# Patient Record
Sex: FEMALE | Race: ASIAN | NOT HISPANIC OR LATINO | Employment: OTHER | URBAN - METROPOLITAN AREA
[De-identification: names, ages, dates, MRNs, and addresses within clinical notes are randomized per-mention and may not be internally consistent; named-entity substitution may affect disease eponyms.]

---

## 2017-01-11 ENCOUNTER — OFFICE VISIT (OUTPATIENT)
Dept: MEDICAL GROUP | Age: 72
End: 2017-01-11
Payer: MEDICARE

## 2017-01-11 VITALS
WEIGHT: 117.6 LBS | HEART RATE: 72 BPM | OXYGEN SATURATION: 96 % | BODY MASS INDEX: 23.09 KG/M2 | TEMPERATURE: 97.9 F | SYSTOLIC BLOOD PRESSURE: 126 MMHG | DIASTOLIC BLOOD PRESSURE: 68 MMHG | HEIGHT: 60 IN

## 2017-01-11 DIAGNOSIS — I10 ESSENTIAL HYPERTENSION: ICD-10-CM

## 2017-01-11 DIAGNOSIS — K21.9 GASTROESOPHAGEAL REFLUX DISEASE WITHOUT ESOPHAGITIS: ICD-10-CM

## 2017-01-11 DIAGNOSIS — E78.5 DYSLIPIDEMIA: ICD-10-CM

## 2017-01-11 DIAGNOSIS — J45.30 MILD PERSISTENT ASTHMA WITHOUT COMPLICATION: ICD-10-CM

## 2017-01-11 DIAGNOSIS — R73.01 IFG (IMPAIRED FASTING GLUCOSE): ICD-10-CM

## 2017-01-11 DIAGNOSIS — R10.10 PAIN OF UPPER ABDOMEN: ICD-10-CM

## 2017-01-11 PROCEDURE — 99215 OFFICE O/P EST HI 40 MIN: CPT | Performed by: INTERNAL MEDICINE

## 2017-01-11 NOTE — Clinical Note
[facility]  Wendy Galan M.D.  [address]  [place] [address]  Fax: [phone] Authorization for Release/Disclosure of Protected Health Information   Name: TERRELL MULLER : 1945 SSN: [id number]   Address: [facility] [place] [address] Phone:    [phone] (home)    I authorize the entity listed below to release/disclose the PHI below to [facility]/Wendy Galan M.D.   Provider or Entity Name:  Dr. Migue Garcia     Address   City, [facility], [facility] Phone:      Fax:     Reason for request: continuity of care   Information to be released:    [  ] LAST COLONOSCOPY, including any PATH REPORT [  ] LAST DEXA  [  ] LAST MAMMOGRAM  [  ] LAST PAP [  ] RETINA EXAM REPORT  [  ] IMMUNIZATION RECORDS  [ x] Release all info      [  ] Check here and initial the line next to each item to release ALL health information INCLUDING  _____ Care and treatment for drug and / or alcohol abuse  _____ HIV testing, infection status, or AIDS  _____ Genetic Testing    DATES OF SERVICE OR TIME PERIOD TO BE DISCLOSED: _____________  I understand and acknowledge that:  * This Authorization may be revoked at any time by you in writing, except if your health information has already been used or disclosed.  * Your health information that will be used or disclosed as a result of you signing this authorization could be re-disclosed by the recipient. If this occurs, your re-disclosed health information may no longer be protected by State or Federal laws.  * You may refuse to sign this Authorization. Your refusal will not affect your ability to obtain treatment.  * This Authorization becomes effective upon signing and will  on (date) __________. If no date is indicated, this Authorization will  one (1) year from the signature date.    Name: Terrell Muller    Signature:     Date: 2017

## 2017-01-12 NOTE — ASSESSMENT & PLAN NOTE
Patient reported intermittent pain on right upper abdomen for a few months. She reported that pain is stabbing and cramping in nature and occur sporadically. She likes to eat beans and recently changed to High fiber diet. She reported has regular bowel movement without blood in stool. She has EGD and colonoscopy in [place] recently. She intentionally tried to lose weight with diet and exercise. Her abdominal pain is not related to meal.

## 2017-01-12 NOTE — ASSESSMENT & PLAN NOTE
Her blood pressure is well controlled with carvedilol 6.25 mg twice a day. She reported taking medication as instructed and denies side effects from taking it.

## 2017-01-12 NOTE — ASSESSMENT & PLAN NOTE
She is on Lipitor 10 mg every afternoon. Lipid panel well controlled with current medication. Liver enzymes within normal. She denied muscle ache or cramping from taking Lipitor.    Results for TERRELL MULLER (MRN [id number]) as of 1/11/2017 17:59   Ref. Range 12/24/2016 08:08   Cholesterol,Tot Latest Ref Range: 100-199 mg/dL 168   Triglycerides Latest Ref Range: 0-149 mg/dL 110   HDL Latest Ref Range: >=40 mg/dL 58   LDL Latest Ref Range: <100 mg/dL 88

## 2017-01-12 NOTE — PROGRESS NOTES
Subjective:   Terrell Bangura is a 71 y.o. female here today for evaluation and management of:      Essential hypertension  Her blood pressure is well controlled with carvedilol 6.25 mg twice a day. She reported taking medication as instructed and denies side effects from taking it.    Dyslipidemia  She is on Lipitor 10 mg every afternoon. Lipid panel well controlled with current medication. Liver enzymes within normal. She denied muscle ache or cramping from taking Lipitor.    Results for TERRELL BANGURA (MRN [id number]) as of 1/11/2017 17:59   Ref. Range 12/24/2016 08:08   Cholesterol,Tot Latest Ref Range: 100-199 mg/dL 168   Triglycerides Latest Ref Range: 0-149 mg/dL 110   HDL Latest Ref Range: >=40 mg/dL 58   LDL Latest Ref Range: <100 mg/dL 88       Gastroesophageal reflux disease without esophagitis  She is taking Prilosec 20 mg daily. She denied recurrent acid reflux if she is on Prilosec. She denied side effects from taking it. She reported that she has 2 EGD done by JUAN MANUEL Sharma, in [place]. We have not received EGD and colonoscopy report yet. She reported that she was treated for peptic ulcer when she was in college. EGD did not show ulcer or lesion or tumor, but she was told that she has thin wall on her small intestine.     IFG (impaired fasting glucose)  Patient has strong family history of diabetes. She has never been treated as diabetes before. She has elevated A1c at 6.5 on 12/24/16. She reported that she cut down sweets and carbohydrate that she has not been very compliance with diabetic diet during the holidays. She wanted to try to control her sugar with diet, and exercise.    Pain of upper abdomen  Patient reported intermittent pain on right upper abdomen for a few months. She reported that pain is stabbing and cramping in nature and occur sporadically. She likes to eat beans and recently changed to High fiber diet. She reported has regular bowel movement without blood in stool. She has EGD  and colonoscopy in [place] recently. She intentionally tried to lose weight with diet and exercise. Her abdominal pain is not related to meal.         Current medicines (including changes today)  Current Outpatient Prescriptions   Medication Sig Dispense Refill   • omeprazole (PRILOSEC) 20 MG delayed-release capsule Take 1 Cap by mouth every day. 90 Cap 3   • carvedilol (COREG) 6.25 MG Tab Take 1 Tab by mouth 2 times a day, with meals. 180 Tab 3   • atorvastatin (LIPITOR) 10 MG Tab Take 1 Tab by mouth every day. 90 Tab 3   • Multiple Vitamins-Minerals (CENTRUM SILVER ADULT 50+ PO) Take  by mouth.     • Fluticasone Furoate (ARNUITY ELLIPTA) 100 MCG/ACT AEROSOL POWDER, BREATH ACTIVATED Inhale  by mouth.     • levalbuterol (XOPENEX HFA) 45 MCG/ACT inhaler Inhale 1-2 Puffs by mouth every four hours as needed for Shortness of Breath.       No current facility-administered medications for this visit.     She  has a past medical history of Asthma; Diabetes (HCC); and GERD (gastroesophageal reflux disease).    ROS   No chest pain, no shortness of breath, no abdominal pain       Objective:     Blood pressure 126/68, pulse 72, temperature 36.6 °C (97.9 °F), height 1.524 m (5'), weight 53.343 kg (117 lb 9.6 oz), SpO2 96 %. Body mass index is 22.97 kg/(m^2).   Physical Exam:  General: Alert, oriented and no acute distress.  Eye contact is good, speech goal directed, affect calm  HEENT: conjunctiva non-injected, sclera non-icteric.  Oral mucous membranes pink and moist with no lesions.  Pinna normal.   Lungs: Normal respiratory effort, clear to auscultation bilaterally with good excursion.  CV: regular rate and rhythm. No murmurs. No carotid bruits.  Abdomen: soft, non distended, mild tender on right lumbar quadrant, No CVAT, Bowel sound normal.  Ext: no edema, color normal, vascularity normal, temperature normal        Assessment and Plan:   The following treatment plan was discussed     1. Essential hypertension  -  Well-controlled. Continue current regimens, carvedilol 6.25 mg twice a day. Recheck lab 1-2 weeks before next follow up visit.  - CBC WITH DIFFERENTIAL; Future  - COMP METABOLIC PANEL; Future    2. Dyslipidemia  - Well-controlled. Continue current regimen, Lipitor 10 mg every afternoon. Recheck lab 1-2 weeks before next follow up visit.  - COMP METABOLIC PANEL; Future  - LIPID PROFILE; Future    3. Mild persistent asthma without complication  - Well-controlled. Continue current regimens. Recheck lab 1-2 weeks before next follow up visit.  - Follow up in Allergy clinic.    4. Gastroesophageal reflux disease without esophagitis  - Well-controlled. Continue current regimens. Recheck lab 1-2 weeks before next follow up visit.    5. IFG (impaired fasting glucose)  - Not well controlled. Discussed extensively for diabetes diet and regular exercise. Recheck lab in 3 months.  - HEMOGLOBIN A1C; Future    6. Pain of upper abdomen  - Discussed possible causes of the patient. Recommend to try Gas-X 2-3 times a day as needed and cut down eating beans.  - Ordered abdominal ultrasound.   - US-ABDOMEN LIMITED; Future      Health Maintenance: We will request report from Dr Migue ZAMBRANO in [place] for EGD and colonoscopy.     Face-to-face time spent 40 minutes with patient and more than half of that time spent for counseling and cooperating of care for medical problems listed above.       Followup: Return in about 3 months (around 4/11/2017), or if symptoms worsen or fail to improve, for hypertension, dyslipidemia, prediabetes, GERD, upper abdominal pain, asthma, lab review.      Please note that this dictation was created using voice recognition software. I have made every reasonable attempt to correct obvious errors, but I expect that there may have unintended errors in text, spelling, punctuation, or grammar that I did not discover.

## 2017-01-12 NOTE — ASSESSMENT & PLAN NOTE
Patient has strong family history of diabetes. She has never been treated as diabetes before. She has elevated A1c at 6.5 on 12/24/16. She reported that she cut down sweets and carbohydrate that she has not been very compliance with diabetic diet during the holidays. She wanted to try to control her sugar with diet, and exercise.

## 2017-01-12 NOTE — ASSESSMENT & PLAN NOTE
She is taking Prilosec 20 mg daily. She denied recurrent acid reflux if she is on Prilosec. She denied side effects from taking it. She reported that she has 2 EGD done by JUAN MANUEL Sharma, in [place]. We have not received EGD and colonoscopy report yet. She reported that she was treated for peptic ulcer when she was in college. EGD did not show ulcer or lesion or tumor, but she was told that she has thin wall on her small intestine.

## 2017-01-25 ENCOUNTER — TELEPHONE (OUTPATIENT)
Dept: MEDICAL GROUP | Age: 72
End: 2017-01-25

## 2017-01-25 ENCOUNTER — HOSPITAL ENCOUNTER (OUTPATIENT)
Dept: RADIOLOGY | Facility: MEDICAL CENTER | Age: 72
End: 2017-01-25
Attending: INTERNAL MEDICINE
Payer: MEDICARE

## 2017-01-25 DIAGNOSIS — R10.10 PAIN OF UPPER ABDOMEN: ICD-10-CM

## 2017-01-25 PROCEDURE — 76700 US EXAM ABDOM COMPLETE: CPT

## 2017-01-26 NOTE — TELEPHONE ENCOUNTER
----- Message from Wendy Galan M.D. sent at 1/25/2017  4:22 PM PST -----  Please inform patient that her recent ultrasound abdomen  was normal except having fatty liver. Recommend to eat low fat, low carbohydrate and high fiber diet as well as do cardio physical exercise regularly. Recommend to avoid alcohol.    Wendy Galan M.D.

## 2017-01-26 NOTE — TELEPHONE ENCOUNTER
Phone Number Called: [phone] (home)     Message: Spoke with patient and relayed message and was understood by patient.    Left Message for patient to call back: N\A

## 2017-03-03 ENCOUNTER — RX ONLY (OUTPATIENT)
Age: 72
Setting detail: RX ONLY
End: 2017-03-03

## 2017-03-17 ENCOUNTER — OFFICE VISIT (OUTPATIENT)
Dept: URGENT CARE | Facility: PHYSICIAN GROUP | Age: 72
End: 2017-03-17
Payer: MEDICARE

## 2017-03-17 ENCOUNTER — TELEPHONE (OUTPATIENT)
Dept: MEDICAL GROUP | Age: 72
End: 2017-03-17

## 2017-03-17 VITALS
SYSTOLIC BLOOD PRESSURE: 122 MMHG | WEIGHT: 115 LBS | HEIGHT: 60 IN | TEMPERATURE: 98.3 F | HEART RATE: 84 BPM | DIASTOLIC BLOOD PRESSURE: 74 MMHG | OXYGEN SATURATION: 97 % | BODY MASS INDEX: 22.58 KG/M2 | RESPIRATION RATE: 18 BRPM

## 2017-03-17 DIAGNOSIS — N30.00 ACUTE CYSTITIS WITHOUT HEMATURIA: ICD-10-CM

## 2017-03-17 PROBLEM — D49.2 NEOPLASM OF UNSPECIFIED BEHAVIOR OF BONE, SOFT TISSUE, AND SKIN: Status: RESOLVED | Noted: 2017-03-03 | Resolved: 2017-03-17

## 2017-03-17 LAB
APPEARANCE UR: CLEAR
BILIRUB UR STRIP-MCNC: NORMAL MG/DL
COLOR UR AUTO: CLEAR
GLUCOSE UR STRIP.AUTO-MCNC: NORMAL MG/DL
KETONES UR STRIP.AUTO-MCNC: NORMAL MG/DL
LEUKOCYTE ESTERASE UR QL STRIP.AUTO: NORMAL
NITRITE UR QL STRIP.AUTO: NORMAL
PH UR STRIP.AUTO: 7 [PH] (ref 5–8)
PROT UR QL STRIP: NORMAL MG/DL
RBC UR QL AUTO: NORMAL
SP GR UR STRIP.AUTO: 1
UROBILINOGEN UR STRIP-MCNC: NORMAL MG/DL

## 2017-03-17 PROCEDURE — G8432 DEP SCR NOT DOC, RNG: HCPCS | Performed by: FAMILY MEDICINE

## 2017-03-17 PROCEDURE — 1101F PT FALLS ASSESS-DOCD LE1/YR: CPT | Performed by: FAMILY MEDICINE

## 2017-03-17 PROCEDURE — 4040F PNEUMOC VAC/ADMIN/RCVD: CPT | Mod: 8P | Performed by: FAMILY MEDICINE

## 2017-03-17 PROCEDURE — G8419 CALC BMI OUT NRM PARAM NOF/U: HCPCS | Performed by: FAMILY MEDICINE

## 2017-03-17 PROCEDURE — 81002 URINALYSIS NONAUTO W/O SCOPE: CPT | Performed by: FAMILY MEDICINE

## 2017-03-17 PROCEDURE — G8484 FLU IMMUNIZE NO ADMIN: HCPCS | Performed by: FAMILY MEDICINE

## 2017-03-17 PROCEDURE — 3014F SCREEN MAMMO DOC REV: CPT | Performed by: FAMILY MEDICINE

## 2017-03-17 PROCEDURE — 99214 OFFICE O/P EST MOD 30 MIN: CPT | Performed by: FAMILY MEDICINE

## 2017-03-17 PROCEDURE — 1036F TOBACCO NON-USER: CPT | Performed by: FAMILY MEDICINE

## 2017-03-17 PROCEDURE — 3017F COLORECTAL CA SCREEN DOC REV: CPT | Mod: 8P | Performed by: FAMILY MEDICINE

## 2017-03-17 RX ORDER — NITROFURANTOIN 25; 75 MG/1; MG/1
CAPSULE ORAL
Qty: 10 CAP | Refills: 0 | Status: SHIPPED | OUTPATIENT
Start: 2017-03-17 | End: 2017-04-12

## 2017-03-17 NOTE — MR AVS SNAPSHOT
Ila Bangura   3/17/2017 5:45 PM   Office Visit   MRN: [id number]    Department:  [facility]   Dept Phone:  [phone]    Description:  Female : 1945   Provider:  Clay Galan M.D.           Reason for Visit     Dysuria poss uti x2 days      Allergies as of 3/17/2017     Allergen Noted Reactions    Augmentin 2016   Hives    Hard to breath    Aspirin 2016   Nausea      You were diagnosed with     Acute cystitis without hematuria   [437921]         Vital Signs     Blood Pressure Pulse Temperature Respirations Height Weight    122/74 mmHg 84 36.8 °C (98.3 °F) 18 1.524 m (5') 52.164 kg (115 lb)    Body Mass Index Oxygen Saturation Smoking Status             22.46 kg/m2 97% Never Smoker          Basic Information     Date Of Birth Sex Race Ethnicity Preferred Language    1945 Female Unable to Obtain Unknown English      Your appointments     2017  4:00 PM   Established Patient with Wendy Galan M.D.   [address] [id number] [phone]           You will be receiving a confirmation call a few days before your appointment from our automated call confirmation system.              Problem List              ICD-10-CM Priority Class Noted - Resolved    Essential hypertension I10   10/9/2016 - Present    Dyslipidemia E78.5   10/9/2016 - Present    Mild persistent asthma without complication J45.30   10/9/2016 - Present    Gastroesophageal reflux disease without esophagitis K21.9   10/9/2016 - Present    IFG (impaired fasting glucose) R73.01   10/10/2016 - Present    Pain of upper abdomen R10.10   2017 - Present      Health Maintenance        Date Due Completion Dates    IMM DTaP/Tdap/Td Vaccine (1 - Tdap) 1964 ---    PAP SMEAR 1966 ---    COLONOSCOPY 1995 ---    IMM ZOSTER VACCINE 2005 ---    BONE DENSITY 2010 ---    IMM PNEUMOCOCCAL 65+ (ADULT) LOW/MEDIUM RISK SERIES (1 of  2 - PCV13) 2/18/2010 ---    IMM INFLUENZA (1) 9/1/2016 ---    MAMMOGRAM 12/5/2017 12/5/2016, 12/5/2016, 12/5/2016, 11/21/2016            Current Immunizations     No immunizations on file.      Below and/or attached are the medications your provider expects you to take. Review all of your home medications and newly ordered medications with your provider and/or pharmacist. Follow medication instructions as directed by your provider and/or pharmacist. Please keep your medication list with you and share with your provider. Update the information when medications are discontinued, doses are changed, or new medications (including over-the-counter products) are added; and carry medication information at all times in the event of emergency situations     Allergies:  AUGMENTIN - Hives     ASPIRIN - Nausea               Medications  Valid as of: March 17, 2017 -  6:11 PM    Generic Name Brand Name Tablet Size Instructions for use    Atorvastatin Calcium (Tab) LIPITOR 10 MG Take 1 Tab by mouth every day.        Carvedilol (Tab) COREG 6.25 MG Take 1 Tab by mouth 2 times a day, with meals.        Fluticasone Furoate (AEROSOL POWDER, BREATH ACTIVATED) Fluticasone Furoate 100 MCG/ACT Inhale  by mouth.        Levalbuterol Tartrate (Aerosol) XOPENEX HFA 45 MCG/ACT Inhale 1-2 Puffs by mouth every four hours as needed for Shortness of Breath.        Multiple Vitamins-Minerals   Take  by mouth.        Nitrofurantoin Monohyd Macro (Cap) MACROBID 100 MG 1 CAP TWICE A DAY X 5 DAYS.        Omeprazole (CAPSULE DELAYED RELEASE) PRILOSEC 20 MG Take 1 Cap by mouth every day.        .                 Medicines prescribed today were sent to:     [facility] [address] [place] [address] - [id number] [facility] [address]    Phone: [phone] Fax: [phone]    Open 24 Hours?: No      Medication refill instructions:       If your prescription bottle indicates you have medication refills left, it is not necessary to  call your provider’s office. Please contact your pharmacy and they will refill your medication.    If your prescription bottle indicates you do not have any refills left, you may request refills at any time through one of the following ways: The online [employer] system (except Urgent Care), by calling your provider’s office, or by asking your pharmacy to contact your provider’s office with a refill request. Medication refills are processed only during regular business hours and may not be available until the next business day. Your provider may request additional information or to have a follow-up visit with you prior to refilling your medication.   *Please Note: Medication refills are assigned a new Rx number when refilled electronically. Your pharmacy may indicate that no refills were authorized even though a new prescription for the same medication is available at the pharmacy. Please request the medicine by name with the pharmacy before contacting your provider for a refill.        Your To Do List     Future Labs/Procedures Complete By Expires    URINE CULTURE(NEW)  As directed 3/24/2017         [employer] Access Code: [email or web address]  Expires: 4/16/2017  6:11 PM    [employer]  A secure, online tool to manage your health information     [employer]’s [employer]® is a secure, online tool that connects you to your personalized health information from the privacy of your home -- day or night - making it very easy for you to manage your healthcare. Once the activation process is completed, you can even access your medical information using the [employer] jm, which is available for free in the Apple Jm store or Google Play store.     [employer] provides the following levels of access (as shown below):   My Chart Features   [facility] Doctor [facility]  Specialists [facility]  Urgent  Care Non-[facility]  Primary Care  Doctor   Email your healthcare team securely and privately 24/7 X X X    Manage appointments: schedule your next  appointment; view details of past/upcoming appointments X      Request prescription refills. X      View recent personal medical records, including lab and immunizations X X X X   View health record, including health history, allergies, medications X X X X   Read reports about your outpatient visits, procedures, consult and ER notes X X X X   See your discharge summary, which is a recap of your hospital and/or ER visit that includes your diagnosis, lab results, and care plan. X X       How to register for [employer]:  1. Go to  [email or web address].  2. Click on the Sign Up Now box, which takes you to the New Member Sign Up page. You will need to provide the following information:  a. Enter your [employer] Access Code exactly as it appears at the top of this page. (You will not need to use this code after you’ve completed the sign-up process. If you do not sign up before the expiration date, you must request a new code.)   b. Enter your date of birth.   c. Enter your home email address.   d. Click Submit, and follow the next screen’s instructions.  3. Create a [employer] ID. This will be your [employer] login ID and cannot be changed, so think of one that is secure and easy to remember.  4. Create a [employer] password. You can change your password at any time.  5. Enter your Password Reset Question and Answer. This can be used at a later time if you forget your password.   6. Enter your e-mail address. This allows you to receive e-mail notifications when new information is available in [employer].  7. Click Sign Up. You can now view your health information.    For assistance activating your [employer] account, call [phone]

## 2017-03-17 NOTE — TELEPHONE ENCOUNTER
1. Caller Name: Ila Banguar                                         Call Back Number: [phone] (home)       Patient approves a detailed voicemail message: yes    2. What are the patient's symptoms (location & severity)? UTI    3. Is this a new symptom Yes    4. When did it start? Started 2 days painful urination    5. Action taken per Active Symptom Guide: Urgent Care recommended    6. Patient agrees to recommended action per active symptom guide

## 2017-03-18 ENCOUNTER — HOSPITAL ENCOUNTER (OUTPATIENT)
Facility: MEDICAL CENTER | Age: 72
End: 2017-03-18
Attending: FAMILY MEDICINE
Payer: MEDICARE

## 2017-03-18 DIAGNOSIS — N30.00 ACUTE CYSTITIS WITHOUT HEMATURIA: ICD-10-CM

## 2017-03-18 PROCEDURE — 87186 SC STD MICRODIL/AGAR DIL: CPT

## 2017-03-18 PROCEDURE — 87077 CULTURE AEROBIC IDENTIFY: CPT

## 2017-03-18 PROCEDURE — 87086 URINE CULTURE/COLONY COUNT: CPT

## 2017-03-18 NOTE — PROGRESS NOTES
Chief Complaint:    Chief Complaint   Patient presents with   • Dysuria     poss uti x2 days       History of Present Illness:    This is a new problem. Symptoms x 2 days. Has dysuria, urinary frequency, and urinary urgency. No hematuria. She reports when she was living in [place], her doctor gave her Macrobid to take prn as she usually got similar symptoms after sex. Usually took med for 1-2 days and was better.      Review of Systems:    Constitutional: Negative for fever, chills, and diaphoresis.   Eyes: Negative for change in vision, photophobia, pain, redness, and discharge.  ENT: Negative for ear pain, ear discharge, hearing loss, tinnitus, nasal congestion, nosebleeds, and sore throat.    Respiratory: Negative for cough, hemoptysis, sputum production, shortness of breath, wheezing, and stridor.    Cardiovascular: Negative for chest pain, palpitations, orthopnea, claudication, leg swelling, and PND.   Gastrointestinal: Negative for abdominal pain, nausea, vomiting, diarrhea, constipation, blood in stool, and melena.   Genitourinary: See HPI.  Musculoskeletal: Negative for myalgias, joint pain, neck pain, and back pain.   Skin: Negative for rash and itching.   Neurological: Negative for dizziness, tingling, tremors, sensory change, speech change, focal weakness, seizures, loss of consciousness, and headaches.   Endo: Negative for polydipsia.   Heme: Does not bruise/bleed easily.   Psychiatric/Behavioral: Negative for depression, suicidal ideas, hallucinations, memory loss and substance abuse. The patient is not nervous/anxious and does not have insomnia.      Past Medical History:    Past Medical History   Diagnosis Date   • Asthma    • Diabetes (CMS-HCC)      borderline   • GERD (gastroesophageal reflux disease)      acid reflux       Past Surgical History:    Past Surgical History   Procedure Laterality Date   • Upper or lower gi procedure with anesthesia       x 2 upper GI   • Colonoscopy       negative   •  Dental surgery  2016     bone graft       Social History:    Social History     Social History   • Marital Status:      Spouse Name: N/A   • Number of Children: N/A   • Years of Education: N/A     Occupational History   • Not on file.     Social History Main Topics   • Smoking status: Never Smoker    • Smokeless tobacco: Never Used   • Alcohol Use: No   • Drug Use: No   • Sexual Activity: Not Currently     Other Topics Concern   • Not on file     Social History Narrative       Family History:    Family History   Problem Relation Age of Onset   • Other Mother      Intestinal Problem   • Cancer Father      Stomach Cancer   • Lung Disease Father      Smoker   • Cancer Sister      breast cancer/mastectomy   • Diabetes Brother    • Cancer Maternal Grandmother      bone cancer   • No Known Problems Maternal Grandfather    • Diabetes Brother    • Diabetes Sister        Medications:    Current Outpatient Prescriptions on File Prior to Visit   Medication Sig Dispense Refill   • omeprazole (PRILOSEC) 20 MG delayed-release capsule Take 1 Cap by mouth every day. 90 Cap 3   • carvedilol (COREG) 6.25 MG Tab Take 1 Tab by mouth 2 times a day, with meals. 180 Tab 3   • atorvastatin (LIPITOR) 10 MG Tab Take 1 Tab by mouth every day. 90 Tab 3   • Multiple Vitamins-Minerals (CENTRUM SILVER ADULT 50+ PO) Take  by mouth.     • Fluticasone Furoate (ARNUITY ELLIPTA) 100 MCG/ACT AEROSOL POWDER, BREATH ACTIVATED Inhale  by mouth.     • levalbuterol (XOPENEX HFA) 45 MCG/ACT inhaler Inhale 1-2 Puffs by mouth every four hours as needed for Shortness of Breath.       No current facility-administered medications on file prior to visit.       Allergies:    Allergies   Allergen Reactions   • Augmentin Hives     Hard to breath   • Aspirin Nausea         Vitals:    Filed Vitals:    03/17/17 1753   BP: 122/74   Pulse: 84   Temp: 36.8 °C (98.3 °F)   Resp: 18   Height: 1.524 m (5')   Weight: 52.164 kg (115 lb)   SpO2: 97%       Physical  Exam:    Constitutional: Vital signs reviewed. Appears well-developed and well-nourished. No acute distress.   Eyes: Sclera white, conjunctivae clear.   ENT: External ears normal. Hearing normal.  Neck: Neck supple.   Pulmonary/Chest: Respirations non-labored.   Abdomen: Bowel sounds are normal active. Soft, non-distended, and non-tender to palpation.    Musculoskeletal: No CVA TTP bilaterally. Normal gait. Normal range of motion. No muscular atrophy or weakness.  Neurological: Alert and oriented to person, place, and time. Muscle tone normal. Coordination normal. Light touch and sensation normal.   Skin: No rashes or lesions. Warm, dry, normal turgor.  Psychiatric: Normal mood and affect. Behavior is normal. Judgment and thought content normal.     Diagnostics:    POCT URINALYSIS (Order #[id number]) on 3/17/17       Component Results      Component Value Ref Range & Units Status     POC Color CLEAR Negative Final     POC Appearance CLEAR Negative Final     POC Leukocyte Esterase MOD 2+ Negative Final     POC Nitrites NEG Negative Final     POC Urobiligen NEG Negative (0.2) mg/dL Final     POC Protein NEG Negative mg/dL Final     POC Urine PH 7.0 5.0 - 8.0 Final     POC Blood SM ++ Negative Final     POC Specific Gravity 1.005 <1.005 - >1.030 Final     POC Ketones NEG Negative mg/dL Final     POC Biliruben NEG Negative mg/dL Final     POC Glucose NEG Negative mg/dL Final         Last Resulted Time     Fri Mar 17, 2017  6:39 PM       Assessment / Plan:    1. Acute cystitis without hematuria [N30.00]  - POCT Urinalysis  - nitrofurantoin monohydr macro (MACROBID) 100 MG Cap; 1 CAP TWICE A DAY X 5 DAYS.  Dispense: 10 Cap; Refill: 0  - URINE CULTURE(NEW); Future      Discussed with her DDX and management options.    Reviewed treatment recommendations from Up To Date.    Agreeable to medication prescribed and send urine for culture.    Says may call [phone] (M) with results and OK to leave message with  results.    Follow-up with PCP or urgent care if getting worse while waiting for urine culture result.

## 2017-03-20 LAB
BACTERIA UR CULT: ABNORMAL
SIGNIFICANT IND 70042: ABNORMAL
SOURCE SOURCE: ABNORMAL

## 2017-03-22 ENCOUNTER — OFFICE VISIT (OUTPATIENT)
Dept: URGENT CARE | Facility: PHYSICIAN GROUP | Age: 72
End: 2017-03-22
Payer: MEDICARE

## 2017-03-22 VITALS
HEART RATE: 72 BPM | DIASTOLIC BLOOD PRESSURE: 80 MMHG | TEMPERATURE: 98.9 F | RESPIRATION RATE: 16 BRPM | HEIGHT: 60 IN | WEIGHT: 115 LBS | OXYGEN SATURATION: 98 % | BODY MASS INDEX: 22.58 KG/M2 | SYSTOLIC BLOOD PRESSURE: 160 MMHG

## 2017-03-22 DIAGNOSIS — R07.89 CHEST DISCOMFORT: ICD-10-CM

## 2017-03-22 DIAGNOSIS — I10 ESSENTIAL HYPERTENSION: ICD-10-CM

## 2017-03-22 PROCEDURE — 1036F TOBACCO NON-USER: CPT | Performed by: FAMILY MEDICINE

## 2017-03-22 PROCEDURE — G8484 FLU IMMUNIZE NO ADMIN: HCPCS | Performed by: FAMILY MEDICINE

## 2017-03-22 PROCEDURE — 99214 OFFICE O/P EST MOD 30 MIN: CPT | Performed by: FAMILY MEDICINE

## 2017-03-22 PROCEDURE — 4040F PNEUMOC VAC/ADMIN/RCVD: CPT | Mod: 8P | Performed by: FAMILY MEDICINE

## 2017-03-22 PROCEDURE — 3014F SCREEN MAMMO DOC REV: CPT | Performed by: FAMILY MEDICINE

## 2017-03-22 PROCEDURE — G8420 CALC BMI NORM PARAMETERS: HCPCS | Performed by: FAMILY MEDICINE

## 2017-03-22 PROCEDURE — 1101F PT FALLS ASSESS-DOCD LE1/YR: CPT | Performed by: FAMILY MEDICINE

## 2017-03-22 PROCEDURE — 93000 ELECTROCARDIOGRAM COMPLETE: CPT | Performed by: FAMILY MEDICINE

## 2017-03-22 PROCEDURE — 3017F COLORECTAL CA SCREEN DOC REV: CPT | Mod: 8P | Performed by: FAMILY MEDICINE

## 2017-03-22 PROCEDURE — G8432 DEP SCR NOT DOC, RNG: HCPCS | Performed by: FAMILY MEDICINE

## 2017-03-22 ASSESSMENT — ENCOUNTER SYMPTOMS
BLURRED VISION: 0
HYPERTENSION: 1
PALPITATIONS: 0
HEADACHES: 0

## 2017-03-22 NOTE — PROGRESS NOTES
Subjective:      Ila Bangura is a 72 y.o. female who presents with Hypertension            Hypertension  This is a new problem. The current episode started more than 1 year ago. The problem has been gradually worsening since onset. Associated symptoms include anxiety and chest pain. Pertinent negatives include no blurred vision, headaches, malaise/fatigue or palpitations. There are no associated agents to hypertension. Risk factors: hypertension. Past treatments include beta blockers. The current treatment provides no improvement.       Review of Systems   Constitutional: Negative for malaise/fatigue.   Eyes: Negative for blurred vision.   Cardiovascular: Positive for chest pain. Negative for palpitations.   Neurological: Negative for headaches.     PMH:  has a past medical history of Asthma; Diabetes (CMS-[facility]); and GERD (gastroesophageal reflux disease).  MEDS:   Current outpatient prescriptions:   •  nitrofurantoin monohydr macro (MACROBID) 100 MG Cap, 1 CAP TWICE A DAY X 5 DAYS., Disp: 10 Cap, Rfl: 0  •  omeprazole (PRILOSEC) 20 MG delayed-release capsule, Take 1 Cap by mouth every day., Disp: 90 Cap, Rfl: 3  •  carvedilol (COREG) 6.25 MG Tab, Take 1 Tab by mouth 2 times a day, with meals., Disp: 180 Tab, Rfl: 3  •  atorvastatin (LIPITOR) 10 MG Tab, Take 1 Tab by mouth every day., Disp: 90 Tab, Rfl: 3  •  Multiple Vitamins-Minerals (CENTRUM SILVER ADULT 50+ PO), Take  by mouth., Disp: , Rfl:   •  Fluticasone Furoate (ARNUITY ELLIPTA) 100 MCG/ACT AEROSOL POWDER, BREATH ACTIVATED, Inhale  by mouth., Disp: , Rfl:   •  levalbuterol (XOPENEX HFA) 45 MCG/ACT inhaler, Inhale 1-2 Puffs by mouth every four hours as needed for Shortness of Breath., Disp: , Rfl:   ALLERGIES:   Allergies   Allergen Reactions   • Augmentin Hives     Hard to breath   • Aspirin Nausea     SURGHX:   Past Surgical History   Procedure Laterality Date   • Upper or lower gi procedure with anesthesia       x 2 upper GI   • Colonoscopy        negative   • Dental surgery  2016     bone graft     SOCHX:  reports that she has never smoked. She has never used smokeless tobacco. She reports that she does not drink alcohol or use illicit drugs.  FH: Family history was reviewed, no pertinent findings to report       Objective:     /84 mmHg  Pulse 72  Temp(Src) 37.2 °C (98.9 °F)  Resp 16  Ht 1.524 m (5')  Wt 52.164 kg (115 lb)  BMI 22.46 kg/m2  SpO2 98%     Physical Exam   Constitutional: She appears well-developed. No distress.   HENT:   Head: Normocephalic.   Cardiovascular: Normal rate, regular rhythm and normal heart sounds.  Exam reveals no friction rub.    No murmur heard.  Pulmonary/Chest: Effort normal. No respiratory distress. She has no wheezes.   Abdominal: Soft. She exhibits no distension. There is no tenderness. There is no rebound and no guarding.   No organomegaly   Neurological: She is alert.   Skin: Skin is dry. No rash noted. She is not diaphoretic.   Psychiatric: She has a normal mood and affect. Her behavior is normal.             Assessment/Plan:     1. Essential hypertension     2. Chest discomfort       monitor difficulty swallowing, respiratory distress wheezing, and shortness of breath. If the above symptoms should occur the patient was directed to go to the emergency department for an evaluation.    Push fluids  Follow-up if symptoms worsen or fail to improve    EKG, NSR 68 without any acute changes  Monitor BPs   Follow-up within the next 3-5 days with primary care provider, if unavailable she may return to urgent care for follow-up to make sure her blood pressures are controlled

## 2017-03-22 NOTE — MR AVS SNAPSHOT
Ila Bangura   3/22/2017 3:30 PM   Office Visit   MRN: [id number]    Department:  [facility]   Dept Phone:  [phone]    Description:  Female : 1945   Provider:  Trung Franco M.D.           Reason for Visit     Hypertension x1 day with some chest pain      Allergies as of 3/22/2017     Allergen Noted Reactions    Augmentin 2016   Hives    Hard to breath    Aspirin 2016   Nausea      You were diagnosed with     Essential hypertension   [8803135]       Chest discomfort   [451388]         Vital Signs     Blood Pressure Pulse Temperature Respirations Height Weight    160/80 mmHg 72 37.2 °C (98.9 °F) 16 1.524 m (5') 52.164 kg (115 lb)    Body Mass Index Oxygen Saturation Smoking Status             22.46 kg/m2 98% Never Smoker          Basic Information     Date Of Birth Sex Race Ethnicity Preferred Language    1945 Female Unable to Obtain Unknown English      Your appointments     2017  4:00 PM   Established Patient with Wendy Galan M.D.   [address] [id number] [phone]           You will be receiving a confirmation call a few days before your appointment from our automated call confirmation system.              Problem List              ICD-10-CM Priority Class Noted - Resolved    Essential hypertension I10   10/9/2016 - Present    Dyslipidemia E78.5   10/9/2016 - Present    Mild persistent asthma without complication J45.30   10/9/2016 - Present    Gastroesophageal reflux disease without esophagitis K21.9   10/9/2016 - Present    IFG (impaired fasting glucose) R73.01   10/10/2016 - Present    Pain of upper abdomen R10.10   2017 - Present      Health Maintenance        Date Due Completion Dates    IMM DTaP/Tdap/Td Vaccine (1 - Tdap) 1964 ---    PAP SMEAR 1966 ---    COLONOSCOPY 1995 ---    IMM ZOSTER VACCINE 2005 ---    BONE DENSITY 2010 ---    IMM PNEUMOCOCCAL  65+ (ADULT) LOW/MEDIUM RISK SERIES (1 of 2 - PCV13) 2/18/2010 ---    IMM INFLUENZA (1) 9/1/2016 ---    MAMMOGRAM 12/5/2017 12/5/2016, 12/5/2016, 12/5/2016, 11/21/2016            Current Immunizations     No immunizations on file.      Below and/or attached are the medications your provider expects you to take. Review all of your home medications and newly ordered medications with your provider and/or pharmacist. Follow medication instructions as directed by your provider and/or pharmacist. Please keep your medication list with you and share with your provider. Update the information when medications are discontinued, doses are changed, or new medications (including over-the-counter products) are added; and carry medication information at all times in the event of emergency situations     Allergies:  AUGMENTIN - Hives     ASPIRIN - Nausea               Medications  Valid as of: March 22, 2017 -  4:27 PM    Generic Name Brand Name Tablet Size Instructions for use    Atorvastatin Calcium (Tab) LIPITOR 10 MG Take 1 Tab by mouth every day.        Carvedilol (Tab) COREG 6.25 MG Take 1 Tab by mouth 2 times a day, with meals.        Fluticasone Furoate (AEROSOL POWDER, BREATH ACTIVATED) Fluticasone Furoate 100 MCG/ACT Inhale  by mouth.        Levalbuterol Tartrate (Aerosol) XOPENEX HFA 45 MCG/ACT Inhale 1-2 Puffs by mouth every four hours as needed for Shortness of Breath.        Multiple Vitamins-Minerals   Take  by mouth.        Nitrofurantoin Monohyd Macro (Cap) MACROBID 100 MG 1 CAP TWICE A DAY X 5 DAYS.        Omeprazole (CAPSULE DELAYED RELEASE) PRILOSEC 20 MG Take 1 Cap by mouth every day.        .                 Medicines prescribed today were sent to:     [facility] [address] [place] [address] [address] [id number] [facility] [address]    Phone: [phone] Fax: [phone]    Open 24 Hours?: No      Medication refill instructions:       If your prescription bottle indicates you have  medication refills left, it is not necessary to call your provider’s office. Please contact your pharmacy and they will refill your medication.    If your prescription bottle indicates you do not have any refills left, you may request refills at any time through one of the following ways: The online iwoca system (except Urgent Care), by calling your provider’s office, or by asking your pharmacy to contact your provider’s office with a refill request. Medication refills are processed only during regular business hours and may not be available until the next business day. Your provider may request additional information or to have a follow-up visit with you prior to refilling your medication.   *Please Note: Medication refills are assigned a new Rx number when refilled electronically. Your pharmacy may indicate that no refills were authorized even though a new prescription for the same medication is available at the pharmacy. Please request the medicine by name with the pharmacy before contacting your provider for a refill.           iwoca Access Code: [email or web address]  Expires: 4/16/2017  6:11 PM    iwoca  A secure, online tool to manage your health information     [employer]’s iwoca® is a secure, online tool that connects you to your personalized health information from the privacy of your home -- day or night - making it very easy for you to manage your healthcare. Once the activation process is completed, you can even access your medical information using the iwoca jm, which is available for free in the Apple Jm store or Google Play store.     iwoca provides the following levels of access (as shown below):   My Chart Features   [facility] Doctor [facility]  Specialists [facility]  Urgent  Care Non-[facility]  Primary Care  Doctor   Email your healthcare team securely and privately 24/7 X X X    Manage appointments: schedule your next appointment; view details of past/upcoming appointments X       Request prescription refills. X      View recent personal medical records, including lab and immunizations X X X X   View health record, including health history, allergies, medications X X X X   Read reports about your outpatient visits, procedures, consult and ER notes X X X X   See your discharge summary, which is a recap of your hospital and/or ER visit that includes your diagnosis, lab results, and care plan. X X       How to register for [employer]:  1. Go to  [email or web address].  2. Click on the Sign Up Now box, which takes you to the New Member Sign Up page. You will need to provide the following information:  a. Enter your App[employer]y Access Code exactly as it appears at the top of this page. (You will not need to use this code after you’ve completed the sign-up process. If you do not sign up before the expiration date, you must request a new code.)   b. Enter your date of birth.   c. Enter your home email address.   d. Click Submit, and follow the next screen’s instructions.  3. Create a Appconomy ID. This will be your Appconomy login ID and cannot be changed, so think of one that is secure and easy to remember.  4. Create a App[employer]y password. You can change your password at any time.  5. Enter your Password Reset Question and Answer. This can be used at a later time if you forget your password.   6. Enter your e-mail address. This allows you to receive e-mail notifications when new information is available in [employer].  7. Click Sign Up. You can now view your health information.    For assistance activating your [employer]y account, call [phone]

## 2017-04-01 ENCOUNTER — HOSPITAL ENCOUNTER (OUTPATIENT)
Dept: LAB | Facility: MEDICAL CENTER | Age: 72
End: 2017-04-01
Attending: INTERNAL MEDICINE
Payer: MEDICARE

## 2017-04-01 DIAGNOSIS — R73.01 IFG (IMPAIRED FASTING GLUCOSE): ICD-10-CM

## 2017-04-01 DIAGNOSIS — I10 ESSENTIAL HYPERTENSION: ICD-10-CM

## 2017-04-01 DIAGNOSIS — E78.5 DYSLIPIDEMIA: ICD-10-CM

## 2017-04-01 LAB
ALBUMIN SERPL BCP-MCNC: 4.2 G/DL (ref 3.2–4.9)
ALBUMIN/GLOB SERPL: 1.2 G/DL
ALP SERPL-CCNC: 66 U/L (ref 30–99)
ALT SERPL-CCNC: 16 U/L (ref 2–50)
ANION GAP SERPL CALC-SCNC: 7 MMOL/L (ref 0–11.9)
AST SERPL-CCNC: 19 U/L (ref 12–45)
BASOPHILS # BLD AUTO: 0.06 K/UL (ref 0–0.12)
BASOPHILS NFR BLD AUTO: 1.3 % (ref 0–1.8)
BILIRUB SERPL-MCNC: 0.7 MG/DL (ref 0.1–1.5)
BUN SERPL-MCNC: 13 MG/DL (ref 8–22)
CALCIUM SERPL-MCNC: 9.7 MG/DL (ref 8.5–10.5)
CHLORIDE SERPL-SCNC: 105 MMOL/L (ref 96–112)
CHOLEST SERPL-MCNC: 161 MG/DL (ref 100–199)
CO2 SERPL-SCNC: 27 MMOL/L (ref 20–33)
CREAT SERPL-MCNC: 0.62 MG/DL (ref 0.5–1.4)
EOSINOPHIL # BLD: 0.16 K/UL (ref 0–0.51)
EOSINOPHIL NFR BLD AUTO: 3.5 % (ref 0–6.9)
ERYTHROCYTE [DISTWIDTH] IN BLOOD BY AUTOMATED COUNT: 39.9 FL (ref 35.9–50)
EST. AVERAGE GLUCOSE BLD GHB EST-MCNC: 128 MG/DL
GLOBULIN SER CALC-MCNC: 3.6 G/DL (ref 1.9–3.5)
GLUCOSE SERPL-MCNC: 116 MG/DL (ref 65–99)
HBA1C MFR BLD: 6.1 % (ref 0–5.6)
HCT VFR BLD AUTO: 46.5 % (ref 37–47)
HDLC SERPL-MCNC: 61 MG/DL
HGB BLD-MCNC: 15.4 G/DL (ref 12–16)
IMM GRANULOCYTES # BLD AUTO: 0.01 K/UL (ref 0–0.11)
IMM GRANULOCYTES NFR BLD AUTO: 0.2 % (ref 0–0.9)
LDLC SERPL CALC-MCNC: 80 MG/DL
LYMPHOCYTES # BLD: 2.31 K/UL (ref 1–4.8)
LYMPHOCYTES NFR BLD AUTO: 50.9 % (ref 22–41)
MCH RBC QN AUTO: 31.2 PG (ref 27–33)
MCHC RBC AUTO-ENTMCNC: 33.1 G/DL (ref 33.6–35)
MCV RBC AUTO: 94.1 FL (ref 81.4–97.8)
MONOCYTES # BLD: 0.37 K/UL (ref 0–0.85)
MONOCYTES NFR BLD AUTO: 8.1 % (ref 0–13.4)
NEUTROPHILS # BLD: 1.63 K/UL (ref 2–7.15)
NEUTROPHILS NFR BLD AUTO: 36 % (ref 44–72)
NRBC # BLD AUTO: 0 K/UL
NRBC BLD-RTO: 0 /100 WBC
PLATELET # BLD AUTO: 251 K/UL (ref 164–446)
PMV BLD AUTO: 9.7 FL (ref 9–12.9)
POTASSIUM SERPL-SCNC: 4.6 MMOL/L (ref 3.6–5.5)
PROT SERPL-MCNC: 7.8 G/DL (ref 6–8.2)
RBC # BLD AUTO: 4.94 M/UL (ref 4.2–5.4)
SODIUM SERPL-SCNC: 139 MMOL/L (ref 135–145)
TRIGL SERPL-MCNC: 98 MG/DL (ref 0–149)
WBC # BLD AUTO: 4.5 K/UL (ref 4.8–10.8)

## 2017-04-01 PROCEDURE — 80061 LIPID PANEL: CPT

## 2017-04-01 PROCEDURE — 85025 COMPLETE CBC W/AUTO DIFF WBC: CPT

## 2017-04-01 PROCEDURE — 36415 COLL VENOUS BLD VENIPUNCTURE: CPT

## 2017-04-01 PROCEDURE — 80053 COMPREHEN METABOLIC PANEL: CPT

## 2017-04-01 PROCEDURE — 83036 HEMOGLOBIN GLYCOSYLATED A1C: CPT | Mod: GA

## 2017-04-12 ENCOUNTER — OFFICE VISIT (OUTPATIENT)
Dept: MEDICAL GROUP | Age: 72
End: 2017-04-12
Payer: MEDICARE

## 2017-04-12 VITALS
HEART RATE: 71 BPM | OXYGEN SATURATION: 96 % | HEIGHT: 60 IN | DIASTOLIC BLOOD PRESSURE: 78 MMHG | TEMPERATURE: 98.1 F | SYSTOLIC BLOOD PRESSURE: 126 MMHG | RESPIRATION RATE: 16 BRPM | BODY MASS INDEX: 22.19 KG/M2 | WEIGHT: 113 LBS

## 2017-04-12 DIAGNOSIS — I10 ESSENTIAL HYPERTENSION: ICD-10-CM

## 2017-04-12 DIAGNOSIS — R73.01 IFG (IMPAIRED FASTING GLUCOSE): ICD-10-CM

## 2017-04-12 DIAGNOSIS — R10.10 PAIN OF UPPER ABDOMEN: ICD-10-CM

## 2017-04-12 DIAGNOSIS — Z78.0 POSTMENOPAUSAL: ICD-10-CM

## 2017-04-12 DIAGNOSIS — E78.5 DYSLIPIDEMIA: ICD-10-CM

## 2017-04-12 DIAGNOSIS — K21.9 GASTROESOPHAGEAL REFLUX DISEASE WITHOUT ESOPHAGITIS: ICD-10-CM

## 2017-04-12 PROCEDURE — 3014F SCREEN MAMMO DOC REV: CPT | Performed by: INTERNAL MEDICINE

## 2017-04-12 PROCEDURE — 1036F TOBACCO NON-USER: CPT | Performed by: INTERNAL MEDICINE

## 2017-04-12 PROCEDURE — 99214 OFFICE O/P EST MOD 30 MIN: CPT | Performed by: INTERNAL MEDICINE

## 2017-04-12 PROCEDURE — G8432 DEP SCR NOT DOC, RNG: HCPCS | Performed by: INTERNAL MEDICINE

## 2017-04-12 PROCEDURE — G8420 CALC BMI NORM PARAMETERS: HCPCS | Performed by: INTERNAL MEDICINE

## 2017-04-12 PROCEDURE — 3017F COLORECTAL CA SCREEN DOC REV: CPT | Mod: 8P | Performed by: INTERNAL MEDICINE

## 2017-04-12 PROCEDURE — 4040F PNEUMOC VAC/ADMIN/RCVD: CPT | Mod: 8P | Performed by: INTERNAL MEDICINE

## 2017-04-12 PROCEDURE — 1101F PT FALLS ASSESS-DOCD LE1/YR: CPT | Performed by: INTERNAL MEDICINE

## 2017-04-12 ASSESSMENT — PAIN SCALES - GENERAL: PAINLEVEL: NO PAIN

## 2017-04-12 NOTE — MR AVS SNAPSHOT
Ila Bangura   2017 4:00 PM   Office Visit   MRN: [id number]    Department:  [address] [place] [address]   Dept Phone:  [phone]    Description:  Female : 1945   Provider:  Wendy Galan M.D.           Reason for Visit     Follow-Up lab review      Allergies as of 2017     Allergen Noted Reactions    Augmentin 2016   Hives    Hard to breath    Aspirin 2016   Nausea      You were diagnosed with     Dyslipidemia   [890817]       Essential hypertension   [5586399]       Gastroesophageal reflux disease without esophagitis   [964492]       IFG (impaired fasting glucose)   [065635]       Postmenopausal   [732989]         Vital Signs     Blood Pressure Pulse Temperature Respirations Height Weight    126/78 mmHg 71 36.7 °C (98.1 °F) 16 1.524 m (5') 51.256 kg (113 lb)    Body Mass Index Oxygen Saturation Breastfeeding? Smoking Status          22.07 kg/m2 96% No Never Smoker         Basic Information     Date Of Birth Sex Race Ethnicity Preferred Language    1945 Female Unable to Obtain Unknown English      Your appointments     Oct 12, 2017  4:00 PM   Established Patient with Wendy Galan M.D.   [address])    [address] [place] [id number] [phone]           You will be receiving a confirmation call a few days before your appointment from our automated call confirmation system.              Problem List              ICD-10-CM Priority Class Noted - Resolved    Essential hypertension I10   10/9/2016 - Present    Dyslipidemia E78.5   10/9/2016 - Present    Mild persistent asthma without complication J45.30   10/9/2016 - Present    Gastroesophageal reflux disease without esophagitis K21.9   10/9/2016 - Present    IFG (impaired fasting glucose) R73.01   10/10/2016 - Present    Pain of upper abdomen R10.10   2017 - Present      Health Maintenance        Date Due Completion Dates    IMM DTaP/Tdap/Td Vaccine (1 - Tdap) 1964 ---    PAP SMEAR 2/18/1966 ---    COLONOSCOPY 2/18/1995 ---    IMM ZOSTER VACCINE 2/18/2005 ---    BONE DENSITY 2/18/2010 ---    IMM PNEUMOCOCCAL 65+ (ADULT) LOW/MEDIUM RISK SERIES (1 of 2 - PCV13) 2/18/2010 ---    MAMMOGRAM 12/5/2017 12/5/2016, 12/5/2016, 12/5/2016, 11/21/2016            Current Immunizations     No immunizations on file.      Below and/or attached are the medications your provider expects you to take. Review all of your home medications and newly ordered medications with your provider and/or pharmacist. Follow medication instructions as directed by your provider and/or pharmacist. Please keep your medication list with you and share with your provider. Update the information when medications are discontinued, doses are changed, or new medications (including over-the-counter products) are added; and carry medication information at all times in the event of emergency situations     Allergies:  AUGMENTIN - Hives     ASPIRIN - Nausea               Medications  Valid as of: April 12, 2017 -  5:08 PM    Generic Name Brand Name Tablet Size Instructions for use    Atorvastatin Calcium (Tab) LIPITOR 10 MG Take 1 Tab by mouth every day.        Carvedilol (Tab) COREG 6.25 MG Take 1 Tab by mouth 2 times a day, with meals.        Fluticasone Furoate (AEROSOL POWDER, BREATH ACTIVATED) Fluticasone Furoate 100 MCG/ACT Inhale  by mouth.        Levalbuterol Tartrate (Aerosol) XOPENEX HFA 45 MCG/ACT Inhale 1-2 Puffs by mouth every four hours as needed for Shortness of Breath.        Multiple Vitamins-Minerals   Take  by mouth.        Omeprazole (CAPSULE DELAYED RELEASE) PRILOSEC 20 MG Take 1 Cap by mouth every day.        .                 Medicines prescribed today were sent to:     [facility]  [place], [place] - [address] [facility]    [address] [facility] [address]    Phone: [phone] Fax: [phone]    Open 24 Hours?: No    [facility] - [place], [place] - [address] [place] [address]     [address] [address]    Phone: [phone] Fax: [phone]    Open 24 Hours?: No      Medication refill instructions:       If your prescription bottle indicates you have medication refills left, it is not necessary to call your provider’s office. Please contact your pharmacy and they will refill your medication.    If your prescription bottle indicates you do not have any refills left, you may request refills at any time through one of the following ways: The online Oris4 system (except Urgent Care), by calling your provider’s office, or by asking your pharmacy to contact your provider’s office with a refill request. Medication refills are processed only during regular business hours and may not be available until the next business day. Your provider may request additional information or to have a follow-up visit with you prior to refilling your medication.   *Please Note: Medication refills are assigned a new Rx number when refilled electronically. Your pharmacy may indicate that no refills were authorized even though a new prescription for the same medication is available at the pharmacy. Please request the medicine by name with the pharmacy before contacting your provider for a refill.        Your To Do List     Future Labs/Procedures Complete By Expires    CBC WITH DIFFERENTIAL  As directed 4/13/2018    COMP METABOLIC PANEL  As directed 4/13/2018    DS-BONE DENSITY STUDY (DEXA)  As directed 10/13/2017    HEMOGLOBIN A1C  As directed 4/13/2018    LIPID PROFILE  As directed 4/13/2018         Oris4 Access Code: [email or web address]  Expires: 4/16/2017  6:11 PM    Oris4  A secure, online tool to manage your health information     MixRank® is a secure, online tool that connects you to your personalized health information from the privacy of your home -- day or night - making it very easy for you to manage your healthcare. Once the activation process is completed, you can even  access your medical information using the Tech.eu jm, which is available for free in the Apple Jm store or Google Play store.     Tech.eu provides the following levels of access (as shown below):   My Chart Features   [facility] Doctor [facility]  Specialists [facility]  Urgent  Care Non-[facility]  Primary Care  Doctor   Email your healthcare team securely and privately 24/7 X X X    Manage appointments: schedule your next appointment; view details of past/upcoming appointments X      Request prescription refills. X      View recent personal medical records, including lab and immunizations X X X X   View health record, including health history, allergies, medications X X X X   Read reports about your outpatient visits, procedures, consult and ER notes X X X X   See your discharge summary, which is a recap of your hospital and/or ER visit that includes your diagnosis, lab results, and care plan. X X       How to register for Tech.eu:  1. Go to  [email or web address].  2. Click on the Sign Up Now box, which takes you to the New Member Sign Up page. You will need to provide the following information:  a. Enter your Tech.eu Access Code exactly as it appears at the top of this page. (You will not need to use this code after you’ve completed the sign-up process. If you do not sign up before the expiration date, you must request a new code.)   b. Enter your date of birth.   c. Enter your home email address.   d. Click Submit, and follow the next screen’s instructions.  3. Create a Tech.eu ID. This will be your Tech.eu login ID and cannot be changed, so think of one that is secure and easy to remember.  4. Create a Tech.eu password. You can change your password at any time.  5. Enter your Password Reset Question and Answer. This can be used at a later time if you forget your password.   6. Enter your e-mail address. This allows you to receive e-mail notifications when new information is available in Tech.eu.  7. Click  Sign Up. You can now view your health information.    For assistance activating your [employer] account, call [phone]

## 2017-04-13 NOTE — ASSESSMENT & PLAN NOTE
Patient is taking carvedilol 6.25 mg twice a day. Her blood pressure is fluctuating throughout the day. She has blood pressure reading at 110-120 in systole in the morning time,130-140 systole in the afternoon and decreased to 110's in systole  evening time. She reported that she has stress at home as her son was recently diagnosed with kidney problem. She also had recent UTI infection and treated with Macrobid by urgent care. She completed antibiotic and her urinary symptoms completely resolved. Her blood pressure started fluctuating when she was treated with Macrobid. Her blood pressure in clinic today was stable and within normal range. We discussed to continue her medication with current dose and closely monitor her blood pressure daily. She is advised to take extra one dose of carvedilol if her blood pressure over 140/90. We also discussed low-sodium diet and regular exercise.

## 2017-04-13 NOTE — ASSESSMENT & PLAN NOTE
Patient is taking Lipitor 10 mg every evening. Her cholesterol is well controlled with current regimen. Liver enzymes are within normal. She tolerates Lipitor without side effect. She denied drinking alcohol. She also advised to eat red meat.    Results for TERRELL MULLER (MRN [id number]) as of 4/12/2017 17:36   Ref. Range 4/1/2017 08:42   Cholesterol,Tot Latest Ref Range: 100-199 mg/dL 161   Triglycerides Latest Ref Range: 0-149 mg/dL 98   HDL Latest Ref Range: >=40 mg/dL 61   LDL Latest Ref Range: <100 mg/dL 80

## 2017-04-13 NOTE — ASSESSMENT & PLAN NOTE
Patient stated that her right upper abdominal pain resolved. Abdominal ultrasound on 1/25/17 was within normal except she has fatty liver.Her LFT was within normal. She has elevated lymphocytosis, but she does not have fever, chills, night sweat, lymphadenopathy or unintentional weight loss. She has colonoscopy in [place] and was told to repeat it in 5 years. We have not received colonoscopy reported from [facility] in [place] yet. We will request the colonoscopy report again.

## 2017-04-13 NOTE — PROGRESS NOTES
Subjective:   Terrell Bangura is a 72 y.o. female here today for evaluation and management of:      Dyslipidemia  Patient is taking Lipitor 10 mg every evening. Her cholesterol is well controlled with current regimen. Liver enzymes are within normal. She tolerates Lipitor without side effect. She denied drinking alcohol. She also advised to eat red meat.    Results for TERRELL BANGURA (MRN [id number]) as of 4/12/2017 17:36   Ref. Range 4/1/2017 08:42   Cholesterol,Tot Latest Ref Range: 100-199 mg/dL 161   Triglycerides Latest Ref Range: 0-149 mg/dL 98   HDL Latest Ref Range: >=40 mg/dL 61   LDL Latest Ref Range: <100 mg/dL 80       Essential hypertension  Patient is taking carvedilol 6.25 mg twice a day. Her blood pressure is fluctuating throughout the day. She has blood pressure reading at 110-120 in systole in the morning time,130-140 systole in the afternoon and decreased to 110's in systole  evening time. She reported that she has stress at home as her son was recently diagnosed with kidney problem. She also had recent UTI infection and treated with Macrobid by urgent care. She completed antibiotic and her urinary symptoms completely resolved. Her blood pressure started fluctuating when she was treated with Macrobid. Her blood pressure in clinic today was stable and within normal range. We discussed to continue her medication with current dose and closely monitor her blood pressure daily. She is advised to take extra one dose of carvedilol if her blood pressure over 140/90. We also discussed low-sodium diet and regular exercise.    Gastroesophageal reflux disease without esophagitis  Patient is taking Prilosec 20 mg every morning, her symptom is well controlled with Prilosec. She denies side effects from taking Prilosec. She had EGD in [place] and stated that EGD was normal.    IFG (impaired fasting glucose)  Patient has significant family history of diabetes. She is prediabetic. A1c improved from 6.5-6.1 on  4/1/17. She reported that she tried to cut down eating rice, but she still has a lot of carbohydrate on her daily meal such as juice, peanut butter with bread daily, potato,etc. She started exercise with treadmill 2-3 times a week recently.     Pain of upper abdomen  Patient stated that her right upper abdominal pain resolved. Abdominal ultrasound on 1/25/17 was within normal except she has fatty liver.Her LFT was within normal. She has elevated lymphocytosis, but she does not have fever, chills, night sweat, lymphadenopathy or unintentional weight loss. She has colonoscopy in [place] and was told to repeat it in 5 years. We have not received colonoscopy reported from [facility] in [place] yet. We will request the colonoscopy report again.            Current medicines (including changes today)  Current Outpatient Prescriptions   Medication Sig Dispense Refill   • omeprazole (PRILOSEC) 20 MG delayed-release capsule Take 1 Cap by mouth every day. 90 Cap 3   • carvedilol (COREG) 6.25 MG Tab Take 1 Tab by mouth 2 times a day, with meals. 180 Tab 3   • atorvastatin (LIPITOR) 10 MG Tab Take 1 Tab by mouth every day. 90 Tab 3   • Multiple Vitamins-Minerals (CENTRUM SILVER ADULT 50+ PO) Take  by mouth.     • Fluticasone Furoate (ARNUITY ELLIPTA) 100 MCG/ACT AEROSOL POWDER, BREATH ACTIVATED Inhale  by mouth.     • levalbuterol (XOPENEX HFA) 45 MCG/ACT inhaler Inhale 1-2 Puffs by mouth every four hours as needed for Shortness of Breath.       No current facility-administered medications for this visit.     She  has a past medical history of Asthma; Diabetes (CMS-[facility]); and GERD (gastroesophageal reflux disease).    ROS   No chest pain, no shortness of breath, no abdominal pain       Objective:     Blood pressure 126/78, pulse 71, temperature 36.7 °C (98.1 °F), resp. rate 16, height 1.524 m (5'), weight 51.256 kg (113 lb), SpO2 96 %, not currently breastfeeding. Body mass index is 22.07 kg/(m^2).   Physical  Exam:  General: Alert, oriented and no acute distress.  Eye contact is good, speech goal directed, affect calm  HEENT: conjunctiva non-injected, sclera non-icteric.  Oral mucous membranes pink and moist with no lesions.  Pinna normal.   Lungs: Normal respiratory effort, clear to auscultation bilaterally with good excursion.  CV: regular rate and rhythm. No murmurs.  Abdomen: soft, non distended, nontender, No CVAT, Bowel sound normal.  Ext: no edema, color normal, vascularity normal, temperature normal        Assessment and Plan:   The following treatment plan was discussed     1. Dyslipidemia  - Well-controlled. Continue current regimens. Recheck lab 1-2 weeks before next follow up visit.  - Advised to eat low fat, low carbohydrate and high fiber diet as well as do cardio physical exercise regularly.   - COMP METABOLIC PANEL; Future  - LIPID PROFILE; Future    2. Essential hypertension  - She has labile BP recently during treatment with Macrobid for UTI last month. She also reported stress at home. BP in clinic was normal.   - Discussed to continue Coreg 6.25 mg twice a day and monitor BP and HR at home. She is advise to take extra one dose of carvedilol if BP >140/90. She is advised to double the dose of carvedilol if BP persistently above 130/80. She is advised to return to clinic as soon as possible if BP is unable to control or if she has symptoms from high BP. Patient understands and agrees with the plan.  - CBC WITH DIFFERENTIAL; Future  - COMP METABOLIC PANEL; Future    3. Gastroesophageal reflux disease without esophagitis  - Well-controlled. Continue current regimens. Recheck lab 1-2 weeks before next follow up visit.  - Advised to avoid acidic food.   - Advised to hold Prilosec if symptoms improve.    4. IFG (impaired fasting glucose)  - Counseling for diabetes diet. Advised to do cardio exercise regularly.  - COMP METABOLIC PANEL; Future  - HEMOGLOBIN A1C; Future    5. Pain of upper abdomen  - Resolved.  Liver enzyme within normal. Ultrasound abdomen showed fatty liver, otherwise normal Ultrasound report.  - Advised to avoid alcohol.    6. Postmenopausal  - Ordered DEXA scan to evaluate bone density.   - DS-BONE DENSITY STUDY (DEXA); Future        Health Maintenance: We will request colonoscopy report again from GI in [place]. Patient reported that she received both Pneumovax 23 and Prevnar 13 in [place]. She also received shingles vaccine in [place].    Followup: Return in about 6 months (around 10/12/2017), or if symptoms worsen or fail to improve, for hypertension, hyperlipidemia, prediabetes, lab review.      Please note that this dictation was created using voice recognition software. I have made every reasonable attempt to correct obvious errors, but I expect that there may have unintended errors in text, spelling, punctuation, or grammar that I did not discover.

## 2017-04-13 NOTE — ASSESSMENT & PLAN NOTE
Patient is taking Prilosec 20 mg every morning, her symptom is well controlled with Prilosec. She denies side effects from taking Prilosec. She had EGD in [place] and stated that EGD was normal.

## 2017-04-13 NOTE — ASSESSMENT & PLAN NOTE
Patient has significant family history of diabetes. She is prediabetic. A1c improved from 6.5-6.1 on 4/1/17. She reported that she tried to cut down eating rice, but she still has a lot of carbohydrate on her daily meal such as juice, peanut butter with bread daily, potato,etc. She started exercise with treadmill 2-3 times a week recently.

## 2017-05-03 ENCOUNTER — HOSPITAL ENCOUNTER (OUTPATIENT)
Dept: RADIOLOGY | Facility: MEDICAL CENTER | Age: 72
End: 2017-05-03
Attending: INTERNAL MEDICINE
Payer: MEDICARE

## 2017-05-03 ENCOUNTER — TELEPHONE (OUTPATIENT)
Dept: MEDICAL GROUP | Age: 72
End: 2017-05-03

## 2017-05-03 DIAGNOSIS — Z78.0 POSTMENOPAUSAL: ICD-10-CM

## 2017-05-03 DIAGNOSIS — R10.10 PAIN OF UPPER ABDOMEN: ICD-10-CM

## 2017-05-03 PROCEDURE — 77080 DXA BONE DENSITY AXIAL: CPT

## 2017-05-03 NOTE — TELEPHONE ENCOUNTER
----- Message from Wendy Galan M.D. sent at 5/3/2017  3:31 PM PDT -----  Please inform patient that her recent DEXA scan showed that she has osteopenia. It is advised her to take vitamin D 2000 units daily and calcium 500 mg 3 times a day. It is advised her to do regular weightbearing exercise to promote bone density.  We will repeat DEXA scan in 2 years for follow-up.    Wendy Galan M.D.

## 2017-05-03 NOTE — TELEPHONE ENCOUNTER
1. Caller Name: [facility]                                         Call Back Number: [phone]      Patient approves a detailed voicemail message: N\A    Patient is scheduled to do an ultrasound today at imaging but there are no orders for that. Patient would like to have a complete or limited ultrasound of her abdomen. Please advise, her appointment is today.

## 2017-05-03 NOTE — TELEPHONE ENCOUNTER
Phone Number Called: [phone] (home)    Message: Patient informed of Dr. Galan's message below and verbalized understanding.    Left Message for patient to call back: no

## 2017-05-03 NOTE — TELEPHONE ENCOUNTER
1. Caller Name: karen at imaging                                         Call Back Number: [phone]      Patient approves a detailed voicemail message: no    Informed karen at imaging of message below

## 2017-05-03 NOTE — TELEPHONE ENCOUNTER
She already did abdomen ultrasound on 1/25/17. She does not require another abdomen ultrasound unless she has new symptoms or she has new concern. I ordered the Dexa scan last visit but not abdomen ultrasound.    Wendy Galan M.D.

## 2017-09-30 ENCOUNTER — HOSPITAL ENCOUNTER (OUTPATIENT)
Dept: LAB | Facility: MEDICAL CENTER | Age: 72
End: 2017-09-30
Attending: INTERNAL MEDICINE
Payer: MEDICARE

## 2017-09-30 DIAGNOSIS — E78.5 DYSLIPIDEMIA: ICD-10-CM

## 2017-09-30 DIAGNOSIS — R73.01 IFG (IMPAIRED FASTING GLUCOSE): ICD-10-CM

## 2017-09-30 DIAGNOSIS — I10 ESSENTIAL HYPERTENSION: ICD-10-CM

## 2017-09-30 LAB
ALBUMIN SERPL BCP-MCNC: 4.2 G/DL (ref 3.2–4.9)
ALBUMIN/GLOB SERPL: 1.2 G/DL
ALP SERPL-CCNC: 58 U/L (ref 30–99)
ALT SERPL-CCNC: 15 U/L (ref 2–50)
ANION GAP SERPL CALC-SCNC: 8 MMOL/L (ref 0–11.9)
AST SERPL-CCNC: 19 U/L (ref 12–45)
BASOPHILS # BLD AUTO: 1.4 % (ref 0–1.8)
BASOPHILS # BLD: 0.07 K/UL (ref 0–0.12)
BILIRUB SERPL-MCNC: 0.7 MG/DL (ref 0.1–1.5)
BUN SERPL-MCNC: 14 MG/DL (ref 8–22)
CALCIUM SERPL-MCNC: 9.6 MG/DL (ref 8.5–10.5)
CHLORIDE SERPL-SCNC: 105 MMOL/L (ref 96–112)
CHOLEST SERPL-MCNC: 158 MG/DL (ref 100–199)
CO2 SERPL-SCNC: 25 MMOL/L (ref 20–33)
CREAT SERPL-MCNC: 0.61 MG/DL (ref 0.5–1.4)
EOSINOPHIL # BLD AUTO: 0.18 K/UL (ref 0–0.51)
EOSINOPHIL NFR BLD: 3.6 % (ref 0–6.9)
ERYTHROCYTE [DISTWIDTH] IN BLOOD BY AUTOMATED COUNT: 39.8 FL (ref 35.9–50)
EST. AVERAGE GLUCOSE BLD GHB EST-MCNC: 131 MG/DL
GFR SERPL CREATININE-BSD FRML MDRD: >60 ML/MIN/1.73 M 2
GLOBULIN SER CALC-MCNC: 3.5 G/DL (ref 1.9–3.5)
GLUCOSE SERPL-MCNC: 108 MG/DL (ref 65–99)
HBA1C MFR BLD: 6.2 % (ref 0–5.6)
HCT VFR BLD AUTO: 45.1 % (ref 37–47)
HDLC SERPL-MCNC: 68 MG/DL
HGB BLD-MCNC: 14.8 G/DL (ref 12–16)
IMM GRANULOCYTES # BLD AUTO: 0.01 K/UL (ref 0–0.11)
IMM GRANULOCYTES NFR BLD AUTO: 0.2 % (ref 0–0.9)
LDLC SERPL CALC-MCNC: 66 MG/DL
LYMPHOCYTES # BLD AUTO: 2.1 K/UL (ref 1–4.8)
LYMPHOCYTES NFR BLD: 41.7 % (ref 22–41)
MCH RBC QN AUTO: 31 PG (ref 27–33)
MCHC RBC AUTO-ENTMCNC: 32.8 G/DL (ref 33.6–35)
MCV RBC AUTO: 94.5 FL (ref 81.4–97.8)
MONOCYTES # BLD AUTO: 0.42 K/UL (ref 0–0.85)
MONOCYTES NFR BLD AUTO: 8.3 % (ref 0–13.4)
NEUTROPHILS # BLD AUTO: 2.26 K/UL (ref 2–7.15)
NEUTROPHILS NFR BLD: 44.8 % (ref 44–72)
NRBC # BLD AUTO: 0 K/UL
NRBC BLD AUTO-RTO: 0 /100 WBC
PLATELET # BLD AUTO: 235 K/UL (ref 164–446)
PMV BLD AUTO: 9.9 FL (ref 9–12.9)
POTASSIUM SERPL-SCNC: 4.2 MMOL/L (ref 3.6–5.5)
PROT SERPL-MCNC: 7.7 G/DL (ref 6–8.2)
RBC # BLD AUTO: 4.77 M/UL (ref 4.2–5.4)
SODIUM SERPL-SCNC: 138 MMOL/L (ref 135–145)
TRIGL SERPL-MCNC: 119 MG/DL (ref 0–149)
WBC # BLD AUTO: 5 K/UL (ref 4.8–10.8)

## 2017-09-30 PROCEDURE — 36415 COLL VENOUS BLD VENIPUNCTURE: CPT

## 2017-09-30 PROCEDURE — 80061 LIPID PANEL: CPT

## 2017-09-30 PROCEDURE — 80053 COMPREHEN METABOLIC PANEL: CPT

## 2017-09-30 PROCEDURE — 85025 COMPLETE CBC W/AUTO DIFF WBC: CPT

## 2017-09-30 PROCEDURE — 83036 HEMOGLOBIN GLYCOSYLATED A1C: CPT | Mod: GA

## 2017-10-07 DIAGNOSIS — E78.5 DYSLIPIDEMIA: ICD-10-CM

## 2017-10-09 RX ORDER — ATORVASTATIN CALCIUM 10 MG/1
TABLET, FILM COATED ORAL
Qty: 90 TAB | Refills: 3 | Status: SHIPPED | OUTPATIENT
Start: 2017-10-09 | End: 2018-10-04 | Stop reason: SDUPTHER

## 2017-10-24 ENCOUNTER — TELEPHONE (OUTPATIENT)
Dept: MEDICAL GROUP | Age: 72
End: 2017-10-24

## 2017-10-25 ENCOUNTER — OFFICE VISIT (OUTPATIENT)
Dept: MEDICAL GROUP | Age: 72
End: 2017-10-25
Payer: MEDICARE

## 2017-10-25 ENCOUNTER — HOSPITAL ENCOUNTER (OUTPATIENT)
Dept: RADIOLOGY | Facility: MEDICAL CENTER | Age: 72
End: 2017-10-25
Attending: INTERNAL MEDICINE
Payer: MEDICARE

## 2017-10-25 VITALS
BODY MASS INDEX: 21.64 KG/M2 | HEART RATE: 66 BPM | DIASTOLIC BLOOD PRESSURE: 60 MMHG | HEIGHT: 60 IN | TEMPERATURE: 97.7 F | SYSTOLIC BLOOD PRESSURE: 118 MMHG | OXYGEN SATURATION: 98 % | WEIGHT: 110.2 LBS | RESPIRATION RATE: 16 BRPM

## 2017-10-25 DIAGNOSIS — M54.50 ACUTE BILATERAL LOW BACK PAIN WITHOUT SCIATICA: ICD-10-CM

## 2017-10-25 DIAGNOSIS — M79.645 FINGER PAIN, LEFT: ICD-10-CM

## 2017-10-25 DIAGNOSIS — I10 ESSENTIAL HYPERTENSION: ICD-10-CM

## 2017-10-25 DIAGNOSIS — Z23 NEEDS FLU SHOT: ICD-10-CM

## 2017-10-25 DIAGNOSIS — E78.5 DYSLIPIDEMIA: ICD-10-CM

## 2017-10-25 DIAGNOSIS — K21.9 GASTROESOPHAGEAL REFLUX DISEASE WITHOUT ESOPHAGITIS: ICD-10-CM

## 2017-10-25 DIAGNOSIS — R73.01 IFG (IMPAIRED FASTING GLUCOSE): ICD-10-CM

## 2017-10-25 DIAGNOSIS — Z12.31 VISIT FOR SCREENING MAMMOGRAM: ICD-10-CM

## 2017-10-25 PROCEDURE — 72100 X-RAY EXAM L-S SPINE 2/3 VWS: CPT

## 2017-10-25 PROCEDURE — G0008 ADMIN INFLUENZA VIRUS VAC: HCPCS | Performed by: INTERNAL MEDICINE

## 2017-10-25 PROCEDURE — 73130 X-RAY EXAM OF HAND: CPT | Mod: LT

## 2017-10-25 PROCEDURE — 90662 IIV NO PRSV INCREASED AG IM: CPT | Performed by: INTERNAL MEDICINE

## 2017-10-25 PROCEDURE — 99215 OFFICE O/P EST HI 40 MIN: CPT | Mod: 25 | Performed by: INTERNAL MEDICINE

## 2017-10-25 ASSESSMENT — PATIENT HEALTH QUESTIONNAIRE - PHQ9: CLINICAL INTERPRETATION OF PHQ2 SCORE: 0

## 2017-10-25 NOTE — ASSESSMENT & PLAN NOTE
Patient state that she stopped taking Prilosec every day. She tries to take Prilosec only when she has symptom. She still has intermittent acid reflux and symptom is more related to diet. She tried to control her diet.

## 2017-10-25 NOTE — ASSESSMENT & PLAN NOTE
This is a new problem. Patient reported that she fell on her driveway last week. She slipped with wet floor and fell backward.   She started feeling pain across the lower back pain. Pain is dull in nature, 4/10 in severity. Pain improved with tylenol but recurred again.

## 2017-10-25 NOTE — ASSESSMENT & PLAN NOTE
This is a new problem. Patient reported that she hit her left index finger with freezer and she started having of pain and swelling on the base of the index finger for 2 weeks. She tried over-the-counter Tylenol off and on. The pain improved with Tylenol. She wants to have hand x-ray to rule out fracture.

## 2017-10-25 NOTE — ASSESSMENT & PLAN NOTE
Patient is taking Lipitor 10 mg every evening. She denies side effects from taking it. Lipid panel is well controlled with current regimens. Liver enzymes are within normal.    Results for TERRELL MULLER (MRN [id number]) as of 10/25/2017 16:27   Ref. Range 9/30/2017 08:15   Cholesterol,Tot Latest Ref Range: 100 - 199 mg/dL 158   Triglycerides Latest Ref Range: 0 - 149 mg/dL 119   HDL Latest Ref Range: >=40 mg/dL 68   LDL Latest Ref Range: <100 mg/dL 66

## 2017-10-25 NOTE — ASSESSMENT & PLAN NOTE
Patient stated that she tries to eat low-carb diet. However, she still likes to eat rice. She has family history of diabetes. Recent A1c was 6.2 on 9/30/17. Impaired fasting glucose was 108 on 9/30/17.

## 2017-10-25 NOTE — TELEPHONE ENCOUNTER
ESTABLISHED PATIENT PRE-VISIT PLANNING     Note: Patient will not be contacted if there is no indication to call.     1.  Reviewed notes from the last few office visits within the medical group: Yes    2.  If any orders were placed at last visit or intended to be done for this visit (i.e. 6 mos follow-up), do we have Results/Consult Notes?        •  Labs - Labs ordered, completed on 9/30 and results are in chart.   Note: If patient appointment is for lab review and patient did not complete labs,                check with provider if OK to reschedule patient until labs completed.       •  Imaging - Imaging was not ordered at last office visit.       •  Referrals - No referrals were ordered at last office visit.    3. Is this appointment scheduled as a Hospital Follow-Up? No    4.  Immunizations were updated in Epic using WebIZ?: No WebIZ record       •  Web Iz Recommendations:     5.  Patient is due for the following Health Maintenance Topics:   Health Maintenance Due   Topic Date Due   • Annual Wellness Visit  1945   • IMM DTaP/Tdap/Td Vaccine (1 - Tdap) 02/18/1964   • PAP SMEAR  02/18/1966   • COLONOSCOPY  02/18/1995   • IMM ZOSTER VACCINE  02/18/2005   • IMM PNEUMOCOCCAL 65+ (ADULT) LOW/MEDIUM RISK SERIES (1 of 2 - PCV13) 02/18/2010   • IMM INFLUENZA (1) 09/01/2017           6.  Patient was NOT informed to arrive 15 min prior to their scheduled appointment and bring in their medication bottles.

## 2017-10-25 NOTE — ASSESSMENT & PLAN NOTE
She is taking carvedilol 6.25 mg twice a day. Her blood pressure is stable with current regimens. She denies side effects from taking carvedilol.

## 2017-10-26 NOTE — PROGRESS NOTES
Subjective:   Terrell Bangura is a 72 y.o. female here today for evaluation and management of:      left index finger pain for 2 weeks after hitting by freezer  This is a new problem. Patient reported that she hit her left index finger with freezer and she started having of pain and swelling on the base of the index finger for 2 weeks. She tried over-the-counter Tylenol off and on. The pain improved with Tylenol. She wants to have hand x-ray to rule out fracture.    IFG (impaired fasting glucose)  Patient stated that she tries to eat low-carb diet. However, she still likes to eat rice. She has family history of diabetes. Recent A1c was 6.2 on 9/30/17. Impaired fasting glucose was 108 on 9/30/17.    Gastroesophageal reflux disease without esophagitis  Patient state that she stopped taking Prilosec every day. She tries to take Prilosec only when she has symptom. She still has intermittent acid reflux and symptom is more related to diet. She tried to control her diet.    Essential hypertension  She is taking carvedilol 6.25 mg twice a day. Her blood pressure is stable with current regimens. She denies side effects from taking carvedilol.    Dyslipidemia  Patient is taking Lipitor 10 mg every evening. She denies side effects from taking it. Lipid panel is well controlled with current regimens. Liver enzymes are within normal.    Results for TERRELL BANGURA (MRN [id number]) as of 10/25/2017 16:27   Ref. Range 9/30/2017 08:15   Cholesterol,Tot Latest Ref Range: 100 - 199 mg/dL 158   Triglycerides Latest Ref Range: 0 - 149 mg/dL 119   HDL Latest Ref Range: >=40 mg/dL 68   LDL Latest Ref Range: <100 mg/dL 66       Acute bilateral low back pain without sciatica  This is a new problem. Patient reported that she fell on her driveway last week. She slipped with wet floor and fell backward.   She started feeling pain across the lower back pain. Pain is dull in nature, 4/10 in severity. Pain improved with tylenol but recurred again.           Current medicines (including changes today)  Current Outpatient Prescriptions   Medication Sig Dispense Refill   • beclomethasone (QVAR) 80 MCG/ACT inhaler Inhale 1 Puff by mouth 2 times a day.     • atorvastatin (LIPITOR) 10 MG Tab TAKE 1 TABLET DAILY 90 Tab 3   • omeprazole (PRILOSEC) 20 MG delayed-release capsule Take 1 Cap by mouth every day. 90 Cap 3   • carvedilol (COREG) 6.25 MG Tab Take 1 Tab by mouth 2 times a day, with meals. 180 Tab 3   • Multiple Vitamins-Minerals (CENTRUM SILVER ADULT 50+ PO) Take  by mouth.     • levalbuterol (XOPENEX HFA) 45 MCG/ACT inhaler Inhale 1-2 Puffs by mouth every four hours as needed for Shortness of Breath.       No current facility-administered medications for this visit.      She  has a past medical history of Asthma; Diabetes (CMS-[facility]); and GERD (gastroesophageal reflux disease).    ROS   No chest pain, no shortness of breath, no abdominal pain       Objective:     Blood pressure 118/60, pulse 66, temperature 36.5 °C (97.7 °F), resp. rate 16, height 1.524 m (5'), weight 50 kg (110 lb 3.2 oz), SpO2 98 %. Body mass index is 21.52 kg/m².   Physical Exam:  General: Alert, oriented and no acute distress.  Eye contact is good, speech goal directed, affect calm  HEENT: conjunctiva non-injected, sclera non-icteric.  Oral mucous membranes pink and moist with no lesions.  Pinna normal.   Lungs: Normal respiratory effort, clear to auscultation bilaterally with good excursion.  CV: regular rate and rhythm. No murmurs.  Abdomen: soft, non distended, nontender, Bowel sound normal.  Ext: no edema, color normal, vascularity normal, temperature normal  Musculoskeletal exam: Mild swelling and tenderness on left second MCP joint. Range of motion movement of the fingers and bilateral hands and wrists are within normal. Mild tender on palpation across the lower back at L4-L5 region.      Assessment and Plan:   The following treatment plan was discussed     1. IFG (impaired fasting  glucose)  - Stable, but not at goal yet. A1c was 6.2. Discussed to avoid processed sugar, simple carbohydrate and rice.  - Recheck lab 1-2 weeks before next follow up visit.  - COMP METABOLIC PANEL; Future  - HEMOGLOBIN A1C; Future    2. Gastroesophageal reflux disease without esophagitis  - Well-controlled. Continue to control with diet. Will take omeprazole for as needed only. Recheck lab 1-2 weeks before next follow up visit.    3. Essential hypertension  - Well-controlled. Continue current regimens. Recheck lab 1-2 weeks before next follow up visit.  - Recommend to monitor blood pressure and heart rate at home.  - COMP METABOLIC PANEL; Future    4. Dyslipidemia  - Well-controlled. Continue current regimens. Recheck lab 1-2 weeks before next follow up visit.  - Advised to eat low fat, low carbohydrate and high fiber diet as well as do cardio physical exercise regularly.   - LIPID PROFILE; Future    5. Acute bilateral low back pain without sciatica  - Will try Tylenol 1000 mg twice a day for 3-5 days.  - Apply ice 20 minutes twice a day.  - Discussed for gentle stretching exercises.  - Order lumbar x-ray for further evaluation. Will advise for result.   - DX-LUMBAR SPINE-2 OR 3 VIEWS; Future    6. left index finger pain for 2 weeks after hitting by freezer  - We will try Tylenol 1000 mg twice a day. Recommended to apply ice as needed.  - Order left hand x-ray for further evaluation. Will advise for result.  - DX-HAND 3+ LEFT; Future    7. Visit for screening mammogram  - Ordered mammogram.  - MA-SCREEN MAMMO W/CAD-BILAT; Future    8. Needs flu shot  - Influenza vaccine was given today after reviewing risks and benefits as well as side effects of vaccine.  - INFLUENZA VACCINE, HIGH DOSE (65+ ONLY)    9. Health maintenance  - Patient has EGD and colonoscopy last year in [place]. We already requested report, but we have not received the colonoscopy report yet. We will request the colonoscopy report from GI in  [place] again.  - Discussed to stop doing Pap smear for cervical cancer screening based on her age as patient has normal Pap smear all the time in the past. Patient agreed to stop doing Pap smear.     Face-to-face time spent 40 minutes with patient and more than half of that time spent for counseling and cooperating of care for medical problems listed above.     Followup: Return in about 6 months (around 4/25/2018), or if symptoms worsen or fail to improve, for hypertension, dyslipidemia, asthma, GERD, impaired fasting glucose, lab review.      Please note that this dictation was created using voice recognition software. I have made every reasonable attempt to correct obvious errors, but I expect that there may have unintended errors in text, spelling, punctuation, or grammar that I did not discover.

## 2017-11-08 DIAGNOSIS — I10 ESSENTIAL HYPERTENSION: ICD-10-CM

## 2017-11-08 DIAGNOSIS — K21.9 GASTROESOPHAGEAL REFLUX DISEASE WITHOUT ESOPHAGITIS: ICD-10-CM

## 2017-11-12 RX ORDER — CARVEDILOL 6.25 MG/1
TABLET ORAL
Qty: 180 TAB | Refills: 0 | Status: SHIPPED | OUTPATIENT
Start: 2017-11-12 | End: 2018-02-11 | Stop reason: SDUPTHER

## 2017-11-12 RX ORDER — OMEPRAZOLE 20 MG/1
CAPSULE, DELAYED RELEASE ORAL
Qty: 90 CAP | Refills: 0 | Status: SHIPPED | OUTPATIENT
Start: 2017-11-12 | End: 2018-02-11 | Stop reason: SDUPTHER

## 2017-11-22 ENCOUNTER — HOSPITAL ENCOUNTER (OUTPATIENT)
Dept: RADIOLOGY | Facility: MEDICAL CENTER | Age: 72
End: 2017-11-22
Attending: INTERNAL MEDICINE
Payer: MEDICARE

## 2017-11-22 DIAGNOSIS — Z12.31 VISIT FOR SCREENING MAMMOGRAM: ICD-10-CM

## 2017-11-22 PROCEDURE — G0202 SCR MAMMO BI INCL CAD: HCPCS

## 2018-01-10 ENCOUNTER — PATIENT OUTREACH (OUTPATIENT)
Dept: HEALTH INFORMATION MANAGEMENT | Facility: OTHER | Age: 73
End: 2018-01-10

## 2018-01-10 NOTE — PROGRESS NOTES
1. Attempt #: 1    2. HealthConnect Verified: no    3. Verify PCP: yes    4. Care Team Updated:       •   DME Company (gait device, O2, CPAP, etc.): YES       •   Other Specialists (eye doctor, derm, GYN, cardiology, endo, etc): YES    5.  Reviewed/Updated the following with patient:       •   Communication Preference Obtained? YES       •   Preferred Pharmacy? YES       •   Preferred Lab? YES       •   Family History (document living status of immediate family members and if + hx of cancer, diabetes, hypertension, hyperlipidemia, heart attack, stroke) YES. Was Abstract Encounter opened and chart updated? YES    6. [employer] Activation: already active    7. [employer] Jm: no    8. Annual Wellness Visit Scheduling  Scheduling Status:Scheduled      9. Care Gap Scheduling (Attempt to Schedule EACH Overdue Care Gap!)     Health Maintenance Due   Topic Date Due   • Annual Wellness Visit  1945   • IMM DTaP/Tdap/Td Vaccine (1 - Tdap) 02/18/1964        Scheduled patient for Annual Wellness Visit      10. Patient was advised: “This is a free wellness visit. The provider will screen for medical conditions to help you stay healthy. If you have other concerns to address you may be asked to discuss these at a separate visit or there may be an additional fee.”     11. Patient was informed to arrive 15 min prior to their scheduled appointment and bring in their medication bottles.

## 2018-02-06 ENCOUNTER — TELEPHONE (OUTPATIENT)
Dept: MEDICAL GROUP | Age: 73
End: 2018-02-06

## 2018-02-06 NOTE — TELEPHONE ENCOUNTER
ANNUAL WELLNESS VISIT PRE-VISIT PLANNING WITH OUTREACH    1.  Immunizations were updated in Epic using WebIZ?:Epic matches WebIZ       •  WebIZ Recommendations: TDAP       •  Is patient due for Tdap? YES. Patient was not notified of copay/out of pocket cost.       •  Is patient due for Shingles?NO    2.  MDX printed and highlighted for Provider? N\A

## 2018-02-08 ENCOUNTER — OFFICE VISIT (OUTPATIENT)
Dept: URGENT CARE | Facility: PHYSICIAN GROUP | Age: 73
End: 2018-02-08
Payer: MEDICARE

## 2018-02-08 VITALS
TEMPERATURE: 98.9 F | BODY MASS INDEX: 21.75 KG/M2 | RESPIRATION RATE: 16 BRPM | SYSTOLIC BLOOD PRESSURE: 138 MMHG | HEIGHT: 60 IN | DIASTOLIC BLOOD PRESSURE: 72 MMHG | HEART RATE: 72 BPM | OXYGEN SATURATION: 99 % | WEIGHT: 110.8 LBS

## 2018-02-08 DIAGNOSIS — N30.01 ACUTE CYSTITIS WITH HEMATURIA: ICD-10-CM

## 2018-02-08 DIAGNOSIS — R30.0 DYSURIA: ICD-10-CM

## 2018-02-08 LAB
APPEARANCE UR: NORMAL
BILIRUB UR STRIP-MCNC: NEGATIVE MG/DL
COLOR UR AUTO: NORMAL
GLUCOSE UR STRIP.AUTO-MCNC: NEGATIVE MG/DL
KETONES UR STRIP.AUTO-MCNC: NEGATIVE MG/DL
LEUKOCYTE ESTERASE UR QL STRIP.AUTO: NORMAL
NITRITE UR QL STRIP.AUTO: NEGATIVE
PH UR STRIP.AUTO: 6 [PH] (ref 5–8)
PROT UR QL STRIP: NORMAL MG/DL
RBC UR QL AUTO: NORMAL
SP GR UR STRIP.AUTO: 1.01
UROBILINOGEN UR STRIP-MCNC: NEGATIVE MG/DL

## 2018-02-08 PROCEDURE — 81002 URINALYSIS NONAUTO W/O SCOPE: CPT | Performed by: EMERGENCY MEDICINE

## 2018-02-08 PROCEDURE — 99203 OFFICE O/P NEW LOW 30 MIN: CPT | Performed by: EMERGENCY MEDICINE

## 2018-02-08 RX ORDER — SULFAMETHOXAZOLE AND TRIMETHOPRIM 800; 160 MG/1; MG/1
1 TABLET ORAL EVERY 12 HOURS
Qty: 6 TAB | Refills: 0 | Status: SHIPPED | OUTPATIENT
Start: 2018-02-08 | End: 2018-02-11

## 2018-02-08 ASSESSMENT — ENCOUNTER SYMPTOMS
CHILLS: 0
CHANGE IN BOWEL HABIT: 0
ABDOMINAL PAIN: 0
DIARRHEA: 0
FEVER: 0
NAUSEA: 0
FLANK PAIN: 0
ANOREXIA: 0
VOMITING: 0

## 2018-02-08 ASSESSMENT — PAIN SCALES - GENERAL: PAINLEVEL: 5=MODERATE PAIN

## 2018-02-08 NOTE — PATIENT INSTRUCTIONS
Consider using over-the-counter Replens for relief of vaginal dryness. You may also use over-the-counter phenazopyridine (AZO) as needed for urinary burning symptom relief. Use an oral probiotic daily, such as Culturelle, Align, or yogurt to reduce gastrointestinal symptoms.    Urinary Tract Infection  A urinary tract infection (UTI) can occur any place along the urinary tract. The tract includes the kidneys, ureters, bladder, and urethra. A type of germ called bacteria often causes a UTI. UTIs are often helped with antibiotic medicine.   HOME CARE   · If given, take antibiotics as told by your doctor. Finish them even if you start to feel better.  · Drink enough fluids to keep your pee (urine) clear or pale yellow.  · Avoid tea, drinks with caffeine, and bubbly (carbonated) drinks.  · Pee often. Avoid holding your pee in for a long time.  · Pee before and after having sex (intercourse).  · Wipe from front to back after you poop (bowel movement) if you are a woman. Use each tissue only once.  GET HELP RIGHT AWAY IF:   · You have back pain.  · You have lower belly (abdominal) pain.  · You have chills.  · You feel sick to your stomach (nauseous).  · You throw up (vomit).  · Your burning or discomfort with peeing does not go away.  · You have a fever.  · Your symptoms are not better in 3 days.  MAKE SURE YOU:   · Understand these instructions.  · Will watch your condition.  · Will get help right away if you are not doing well or get worse.     This information is not intended to replace advice given to you by your health care provider. Make sure you discuss any questions you have with your health care provider.     Document Released: 06/05/2009 Document Revised: 01/08/2016 Document Reviewed: 07/18/2013  [employer] Interactive Patient Education ©2016 [employer]

## 2018-02-08 NOTE — PROGRESS NOTES
Subjective:      Ila Bangura is a 72 y.o. female who presents with Urinary Frequency (Burning sensation while urinating x3days)            UTI   This is a new problem. Episode onset: 3 days. The problem occurs daily. The problem has been waxing and waning. Associated symptoms include urinary symptoms. Pertinent negatives include no abdominal pain, anorexia, change in bowel habit, chills, fever, nausea, rash or vomiting. The symptoms are aggravated by intercourse. She has tried drinking for the symptoms. The treatment provided mild relief.       Review of Systems   Constitutional: Negative for chills and fever.   Gastrointestinal: Negative for abdominal pain, anorexia, change in bowel habit, diarrhea, nausea and vomiting.   Genitourinary: Positive for dysuria and frequency. Negative for flank pain, hematuria and urgency.        No vaginal discharge or bleeding. Denies sexually transmitted disease exposure or risk.   Skin: Negative for itching and rash.     PMH:  has a past medical history of Asthma; Diabetes (CMS-[facility]); and GERD (gastroesophageal reflux disease).  MEDS:   Current Outpatient Prescriptions:   •  sulfamethoxazole-trimethoprim (BACTRIM DS) 800-160 MG tablet, Take 1 Tab by mouth every 12 hours for 3 days., Disp: 6 Tab, Rfl: 0  •  omeprazole (PRILOSEC) 20 MG delayed-release capsule, TAKE 1 CAPSULE DAILY, Disp: 90 Cap, Rfl: 0  •  carvedilol (COREG) 6.25 MG Tab, TAKE 1 TABLET TWICE A DAY WITH MEALS, Disp: 180 Tab, Rfl: 0  •  beclomethasone (QVAR) 80 MCG/ACT inhaler, Inhale 1 Puff by mouth 2 times a day., Disp: , Rfl:   •  atorvastatin (LIPITOR) 10 MG Tab, TAKE 1 TABLET DAILY, Disp: 90 Tab, Rfl: 3  •  Multiple Vitamins-Minerals (CENTRUM SILVER ADULT 50+ PO), Take  by mouth., Disp: , Rfl:   •  levalbuterol (XOPENEX HFA) 45 MCG/ACT inhaler, Inhale 1-2 Puffs by mouth every four hours as needed for Shortness of Breath., Disp: , Rfl:   ALLERGIES:   Allergies   Allergen Reactions   • Augmentin Hives     Hard to  breath   • Aspirin Nausea     SURGHX:   Past Surgical History:   Procedure Laterality Date   • DENTAL SURGERY  2016    bone graft   • COLONOSCOPY      negative   • UPPER OR LOWER GI PROCEDURE WITH ANESTHESIA      x 2 upper GI     SOCHX:  reports that she has never smoked. She has never used smokeless tobacco. She reports that she does not drink alcohol or use drugs.  FH: family history includes Cancer in her father, maternal grandmother, and sister; Diabetes in her brother, brother, and sister; Lung Disease in her father; No Known Problems in her maternal grandfather; Other in her mother.       Objective:     /72   Pulse 72   Temp 37.2 °C (98.9 °F)   Resp 16   Ht 1.524 m (5')   Wt 50.3 kg (110 lb 12.8 oz)   SpO2 99%   Breastfeeding? No   BMI 21.64 kg/m²      Physical Exam   Constitutional: She appears well-developed and well-nourished. She is cooperative. She does not have a sickly appearance. She does not appear ill. No distress.   Cardiovascular: Normal rate and regular rhythm.    Murmur heard.   Systolic murmur is present with a grade of 1/6   Pulmonary/Chest: Effort normal and breath sounds normal.   Abdominal: Soft. She exhibits no distension. There is no tenderness. There is no CVA tenderness.   Neurological: She is alert.   Skin: Skin is warm and dry.   Psychiatric: She has a normal mood and affect.          Advised follow up PCP as directed.     Assessment/Plan:     1. Dysuria  Positive LE, trace protein, positive blood- POCT Urinalysis    2. Acute cystitis with hematuria  Recommended supportive care measures, including rest, increasing oral fluid intake and use of over-the-counter medications for relief of symptoms.  - sulfamethoxazole-trimethoprim (BACTRIM DS) 800-160 MG tablet; Take 1 Tab by mouth every 12 hours for 3 days.  Dispense: 6 Tab; Refill: 0

## 2018-02-11 DIAGNOSIS — K21.9 GASTROESOPHAGEAL REFLUX DISEASE WITHOUT ESOPHAGITIS: ICD-10-CM

## 2018-02-11 DIAGNOSIS — I10 ESSENTIAL HYPERTENSION: ICD-10-CM

## 2018-02-12 RX ORDER — OMEPRAZOLE 20 MG/1
CAPSULE, DELAYED RELEASE ORAL
Qty: 90 CAP | Refills: 0 | Status: SHIPPED | OUTPATIENT
Start: 2018-02-12 | End: 2018-05-13 | Stop reason: SDUPTHER

## 2018-02-12 RX ORDER — CARVEDILOL 6.25 MG/1
TABLET ORAL
Qty: 180 TAB | Refills: 0 | Status: SHIPPED | OUTPATIENT
Start: 2018-02-12 | End: 2018-05-13 | Stop reason: SDUPTHER

## 2018-02-14 ENCOUNTER — OFFICE VISIT (OUTPATIENT)
Dept: MEDICAL GROUP | Age: 73
End: 2018-02-14
Payer: MEDICARE

## 2018-02-14 VITALS
HEART RATE: 69 BPM | HEIGHT: 60 IN | SYSTOLIC BLOOD PRESSURE: 136 MMHG | OXYGEN SATURATION: 99 % | WEIGHT: 111.6 LBS | DIASTOLIC BLOOD PRESSURE: 82 MMHG | TEMPERATURE: 97.7 F | BODY MASS INDEX: 21.91 KG/M2

## 2018-02-14 DIAGNOSIS — E78.5 DYSLIPIDEMIA: ICD-10-CM

## 2018-02-14 DIAGNOSIS — Z91.81 RISK FOR FALLS: ICD-10-CM

## 2018-02-14 DIAGNOSIS — K21.9 GASTROESOPHAGEAL REFLUX DISEASE WITHOUT ESOPHAGITIS: ICD-10-CM

## 2018-02-14 DIAGNOSIS — R73.01 IFG (IMPAIRED FASTING GLUCOSE): ICD-10-CM

## 2018-02-14 DIAGNOSIS — I10 ESSENTIAL HYPERTENSION: ICD-10-CM

## 2018-02-14 DIAGNOSIS — J45.30 MILD PERSISTENT ASTHMA WITHOUT COMPLICATION: ICD-10-CM

## 2018-02-14 DIAGNOSIS — R01.1 UNDIAGNOSED CARDIAC MURMURS: ICD-10-CM

## 2018-02-14 ASSESSMENT — ACTIVITIES OF DAILY LIVING (ADL): BATHING_REQUIRES_ASSISTANCE: 0

## 2018-02-14 ASSESSMENT — PATIENT HEALTH QUESTIONNAIRE - PHQ9: CLINICAL INTERPRETATION OF PHQ2 SCORE: 0

## 2018-02-14 ASSESSMENT — PAIN SCALES - GENERAL: PAINLEVEL: NO PAIN

## 2018-02-15 NOTE — PROGRESS NOTES
Chief Complaint   Patient presents with   • Annual Wellness Visit              HPI:  Ila is a 72 y.o. here for Medicare Annual Wellness Visit        Patient Active Problem List    Diagnosis Date Noted   • Undiagnosed cardiac murmurs 02/14/2018   • Acute bilateral low back pain without sciatica 10/25/2017   • left index finger pain for 2 weeks after hitting by freezer 10/25/2017   • Pain of upper abdomen 01/11/2017   • IFG (impaired fasting glucose) 10/10/2016   • Essential hypertension 10/09/2016   • Dyslipidemia 10/09/2016   • Mild persistent asthma without complication 10/09/2016   • Gastroesophageal reflux disease without esophagitis 10/09/2016       Current Outpatient Prescriptions   Medication Sig Dispense Refill   • carvedilol (COREG) 6.25 MG Tab TAKE 1 TABLET TWICE A DAY WITH MEALS 180 Tab 0   • omeprazole (PRILOSEC) 20 MG delayed-release capsule TAKE 1 CAPSULE DAILY 90 Cap 0   • beclomethasone (QVAR) 80 MCG/ACT inhaler Inhale 1 Puff by mouth 2 times a day.     • atorvastatin (LIPITOR) 10 MG Tab TAKE 1 TABLET DAILY 90 Tab 3   • Multiple Vitamins-Minerals (CENTRUM SILVER ADULT 50+ PO) Take  by mouth.     • levalbuterol (XOPENEX HFA) 45 MCG/ACT inhaler Inhale 1-2 Puffs by mouth every four hours as needed for Shortness of Breath.       No current facility-administered medications for this visit.         Patient is taking medications as noted in medication list.  Current supplements as per medication list.     Allergies: Augmentin and Aspirin    Current social contact/activities: go to dinner     Is patient current with immunizations? No, due for TDAP. Patient is interested in receiving NONE today.    She  reports that she has never smoked. She has never used smokeless tobacco. She reports that she does not drink alcohol or use drugs.  Counseling given: Yes        DPA/Advanced directive: Patient has Advanced Directive, but it is not on file. Instructed to bring in a copy to scan into their chart.    ROS:     Gait: Uses no assistive device   Ostomy: no   Other tubes: no   Amputations: no   Chronic oxygen use no   Last eye exam 2017   Wears hearing aids: no   : Reports urinary leakage during the last 6 months that has not interfered at all with their daily activities or sleep.      Screening:        Depression Screening    Little interest or pleasure in doing things?  0 - not at all  Feeling down, depressed, or hopeless? 0 - not at all  Patient Health Questionnaire Score: 0    If depressive symptoms identified deferred to follow up visit unless specifically addressed in assessment and plan.    Interpretation of PHQ-9 Total Score   Score Severity   1-4 No Depression   5-9 Mild Depression   10-14 Moderate Depression   15-19 Moderately Severe Depression   20-27 Severe Depression    Screening for Cognitive Impairment    Three Minute Recall (apple, watch, azeem)  3/3 Table leader sunset  3/3  Draw clock face with all 12 numbers set to the hand to show 10 minutes past 11 o'clock  1 5/5  If cognitive concerns identified, deferred for follow up unless specifically addressed in assessment and plan.    Fall Risk Assessment    Has the patient had two or more falls in the last year or any fall with injury in the last year?  Yes  If fall risk identified, deferred for follow up unless specifically addressed in assessment and plan.    Safety Assessment    Throw rugs on floor.  Yes  Handrails on all stairs.  Yes  Good lighting in all hallways.  Yes  Difficulty hearing.  No  Patient counseled about all safety risks that were identified.    Functional Assessment ADLs    Are there any barriers preventing you from cooking for yourself or meeting nutritional needs?  No.    Are there any barriers preventing you from driving safely or obtaining transportation?  No.    Are there any barriers preventing you from using a telephone or calling for help?  No.    Are there any barriers preventing you from shopping?  No.    Are there any barriers  preventing you from taking care of your own finances?  No.    Are there any barriers preventing you from managing your medications?  No.    Are there any barriers preventing you from showering/bathing yourself?  No.    Are you currently engaging any exercise or physical activity?  Yes.  walking    Health Maintenance Summary                Annual Wellness Visit Overdue 1945     IMM DTaP/Tdap/Td Vaccine Overdue 2/18/1964     MAMMOGRAM Next Due 11/22/2018      Done 11/22/2017 MA-SCREEN MAMMO W/CAD-BILAT     Patient has more history with this topic...    BONE DENSITY Next Due 5/3/2022      Done 5/3/2017 DS-BONE DENSITY STUDY (DEXA)    COLONOSCOPY Next Due 10/13/2025      Done 10/13/2015 REFERRAL TO GI FOR COLONOSCOPY          Patient Care Team:  Wendy Galan M.D. as PCP - General (Internal Medicine)  Mateus Garcia III, M.D. as Consulting Physician (Gastroenterology)  Kingston Grider M.D. (Allergy)    Social History   Substance Use Topics   • Smoking status: Never Smoker   • Smokeless tobacco: Never Used   • Alcohol use No     Family History   Problem Relation Age of Onset   • Other Mother      Intestinal Problem   • Cancer Father      Stomach Cancer   • Lung Disease Father      Smoker   • Cancer Sister      breast cancer/mastectomy   • Diabetes Brother    • Cancer Maternal Grandmother      bone cancer   • No Known Problems Maternal Grandfather    • Diabetes Brother    • Diabetes Sister      She  has a past medical history of Asthma; Diabetes (CMS-[facility]); and GERD (gastroesophageal reflux disease).   Past Surgical History:   Procedure Laterality Date   • DENTAL SURGERY  2016    bone graft   • COLONOSCOPY      negative   • UPPER OR LOWER GI PROCEDURE WITH ANESTHESIA      x 2 upper GI           Exam:     Blood pressure 136/82, pulse 69, temperature 36.5 °C (97.7 °F), height 1.524 m (5'), weight 50.6 kg (111 lb 9.6 oz), SpO2 99 %. Body mass index is 21.8 kg/m².    Hearing good.    Dentition good  Alert, oriented  in no acute distress.  Eye contact is good, speech goal directed, affect calm      Assessment and Plan. The following treatment and monitoring plan is recommended:    1. Mild persistent asthma without complication      Well-controlled. Continue Qvar inhaler twice a day and Xopenex as needed.   2. IFG (impaired fasting glucose)      Chronic. Continue to control with diet and exercise. Counseling to avoid eating processed sugar and simple carbohydrate.   3. Gastroesophageal reflux disease without esophagitis      Chronic and stable. Well controlled with omeprazole 20 mg daily. Reviewed potential side effect of omeprazole with patient.   4. Essential hypertension  ECHOCARDIOGRAM COMP W/O CONT    Well-controlled. Continue carvedilol 6.25 mg twice a day. Recommend to monitor blood pressure and heart rate at home.   5. Dyslipidemia      Well-controlled. Continue Lipitor 10 mg every evening. Reviewed potential side effects of Lipitor with patient.   6. Undiagnosed cardiac murmurs  ECHOCARDIOGRAM COMP W/O CONT    Patient has undiagnosed systolic murmur on aortic and pulmonary area noticed on exam. She is asymptomatic. She would like to do echocardiogram for further evaluation.    7. Risk for falls     Counseling for getting up slowly, turn on the light when she gets up at night, installing nightlight at home.  Review side effects of medications with patient. Recommend to avoid sedating medication as much as possible.  Patient identified as fall risk.  Appropriate orders and counseling given.         Services suggested: No services needed at this time  Health Care Screening recommendations as per orders if indicated.  Referrals offered: PT/OT/Nutrition counseling/Behavioral Health/Smoking cessation as per orders if indicated.    Discussion today about general wellness and lifestyle habits:    · Prevent falls and reduce trip hazards; Cautioned about securing or removing rugs.  · Have a working fire alarm and carbon monoxide  detector;   · Engage in regular physical activity and social activities       Follow-up: Return in about 3 months (around 5/1/2018), or if symptoms worsen or fail to improve, for hypertension, dyslipidemia, asthma, impaired fasting glucose, murmurs, lab review.

## 2018-02-21 ENCOUNTER — HOSPITAL ENCOUNTER (OUTPATIENT)
Dept: CARDIOLOGY | Facility: MEDICAL CENTER | Age: 73
End: 2018-02-21
Attending: INTERNAL MEDICINE
Payer: MEDICARE

## 2018-02-21 DIAGNOSIS — R01.1 UNDIAGNOSED CARDIAC MURMURS: ICD-10-CM

## 2018-02-21 DIAGNOSIS — I10 ESSENTIAL HYPERTENSION: ICD-10-CM

## 2018-02-21 LAB
LV EJECT FRACT  99904: 70
LV EJECT FRACT MOD 2C 99903: 73.4
LV EJECT FRACT MOD 4C 99902: 69.9
LV EJECT FRACT MOD BP 99901: 71.15

## 2018-02-21 PROCEDURE — 93306 TTE W/DOPPLER COMPLETE: CPT | Mod: 26 | Performed by: INTERNAL MEDICINE

## 2018-02-21 PROCEDURE — 93306 TTE W/DOPPLER COMPLETE: CPT

## 2018-02-26 ENCOUNTER — TELEPHONE (OUTPATIENT)
Dept: MEDICAL GROUP | Age: 73
End: 2018-02-26

## 2018-02-26 NOTE — TELEPHONE ENCOUNTER
Phone Number Called: [phone] (home)     Message: PT called back and has been notified. PT wants to know what is a good substitute  for aspire because she I allergic to it. PT also wants a call back from Dr. Galan    Left Message for patient to call back: N\A

## 2018-02-26 NOTE — TELEPHONE ENCOUNTER
Phone Number Called: [phone] (home)     Message: called pt and lvm to call back     Left Message for patient to call back: N\A

## 2018-02-26 NOTE — TELEPHONE ENCOUNTER
----- Message from Wendy Galan M.D. sent at 2/21/2018  7:51 PM PST -----  Please inform patient that her recent echocardiogram is generally normal. He has very mild mitral valve leakage, which we call trace mitral regurgitation. She has some calcification of aortic root but no signs of obstruction. There is no new changes will be recommended. She needs to continue carvedilol, atorvastatin with same dose and take baby aspirin daily.  It is advised to control blood pressure, cholesterol and blood sugar very well to prevent cardiovascular disease.    Thanks!  Wendy Galan M.D.

## 2018-02-27 NOTE — TELEPHONE ENCOUNTER
Called patient and discussed her concern. I reviewed her echocardiogram with her over the phone and answered all her questions. She feels nauseous and stomach discomfort from taking aspirin. She does not have any known coronary artery disease. Patient agrees to try low-dose baby aspirin 81 mg with meal once a day. She will take Prilosec 20 mg daily for acid reflux or stomach discomfort. Patient is advised to stop taking aspirin if she has abdominal pain or black tarry stool or bloody bowel movement. Patient understands and agrees with the plan.    Wendy Galan M.D.

## 2018-03-30 ENCOUNTER — TELEPHONE (OUTPATIENT)
Dept: MEDICAL GROUP | Age: 73
End: 2018-03-30

## 2018-03-30 NOTE — TELEPHONE ENCOUNTER
Future Appointments       Provider Department Center    4/2/2018 5:00 PM Wendy Galan M.D. [address]    5/1/2018 3:00 PM Wendy Galan M.D. [place] [address] [address]        ESTABLISHED PATIENT PRE-VISIT PLANNING     Note: Patient will not be contacted if there is no indication to call.     1.  Reviewed notes from the last few office visits within the medical group: Yes    2.  If any orders were placed at last visit or intended to be done for this visit (i.e. 6 mos follow-up), do we have Results/Consult Notes?        •  Labs - Labs ordered, but not to be completed until 5/1/18.   Note: If patient appointment is for lab review and patient did not complete labs, check with provider if OK to reschedule patient until labs completed.       •  Imaging - Imaging ordered, completed and results are in chart.       •  Referrals - No referrals were ordered at last office visit.    3. Is this appointment scheduled as a Hospital Follow-Up? No    4.  Immunizations were updated in Epic using WebIZ?: Epic matches WebIZ       •  Web Iz Recommendations: TDAP    5.  Patient is due for the following Health Maintenance Topics:   Health Maintenance Due   Topic Date Due   • Annual Wellness Visit  1945   • IMM DTaP/Tdap/Td Vaccine (1 - Tdap) 02/18/1964       - Patient is up-to-date on all Health Maintenance topics. No records have been requested at this time.    6.  MDX printed for Provider? NO    7.  Patient was NOT informed to arrive 15 min prior to their scheduled appointment and bring in their medication bottles.

## 2018-04-02 ENCOUNTER — OFFICE VISIT (OUTPATIENT)
Dept: MEDICAL GROUP | Age: 73
End: 2018-04-02
Payer: MEDICARE

## 2018-04-02 VITALS
HEART RATE: 66 BPM | DIASTOLIC BLOOD PRESSURE: 75 MMHG | BODY MASS INDEX: 21.99 KG/M2 | SYSTOLIC BLOOD PRESSURE: 130 MMHG | OXYGEN SATURATION: 96 % | TEMPERATURE: 98.8 F | HEIGHT: 60 IN | WEIGHT: 112 LBS

## 2018-04-02 DIAGNOSIS — E78.5 DYSLIPIDEMIA: ICD-10-CM

## 2018-04-02 DIAGNOSIS — R73.01 IFG (IMPAIRED FASTING GLUCOSE): ICD-10-CM

## 2018-04-02 DIAGNOSIS — I10 ESSENTIAL HYPERTENSION: ICD-10-CM

## 2018-04-02 DIAGNOSIS — G44.219 EPISODIC TENSION-TYPE HEADACHE, NOT INTRACTABLE: ICD-10-CM

## 2018-04-02 PROCEDURE — 99214 OFFICE O/P EST MOD 30 MIN: CPT | Performed by: INTERNAL MEDICINE

## 2018-04-02 RX ORDER — CHOLECALCIFEROL (VITAMIN D3) 125 MCG
500 CAPSULE ORAL DAILY
COMMUNITY
End: 2020-01-17

## 2018-04-02 RX ORDER — LOSARTAN POTASSIUM 25 MG/1
25 TABLET ORAL DAILY
Qty: 90 TAB | Refills: 3 | Status: SHIPPED | OUTPATIENT
Start: 2018-04-02 | End: 2018-05-08

## 2018-04-03 NOTE — ASSESSMENT & PLAN NOTE
Patient reported that she has headache when her blood pressure increased, but she does not have any other neurological symptoms such as dizziness, vision changes, nausea, vomiting, weakness or sensation change or tingling, numbness. She stated that her 's cousin was diagnosed with brain tumor recently and she also concerned that she will have brain tumor. I reviewed the signs and symptoms of headache and other neurological symptoms with her. I do not think that she has any brain tumor causing the headache had headache is related to elevated blood pressure. However, I told her that if she has any new neurological symptoms or the worst headache in her life, she should go to ER as soon as possible for further evaluation. We will try Tylenol 500 mg 3 times a day when necessary for headache. If her symptoms do not improve or headache gets worse or she has any new neurological signs or symptoms, we will consider to have CT head or MRI head. Patient and  agreed with the plan.

## 2018-04-03 NOTE — PROGRESS NOTES
Subjective:   Ila Bangura is a 73 y.o. female here today for evaluation and management of:      Essential hypertension  Patient presented to clinic with her  stated that her blood pressure is high at home for a couple weeks. She is taking carvedilol 6.25 mg twice a day. She stated that she could not take aspirin, even with baby aspirin 81 mg daily, as aspirin caused severe acid reflux and nausea on her.   She stated that her blood pressure reading was 160/70 last night and she had headache as well with high blood pressure. She denied chest pain, shortness of breath or dizziness or weakness or decreased sensation or any neurological symptoms. Patient stated that she has intermittent blood pressure high, above 150/90 at home. She denies side effects from taking carvedilol.    IFG (impaired fasting glucose)  Patient stated that she tried to eat low sugar and low carbohydrate diet and she also avoid eating red meat already. She is eating oatmeal every morning.    Dyslipidemia  Patient is taking Lipitor 10 mg every evening. She denies side effects from taking it. Her cholesterol level is stable and well controlled.    Episodic tension-type headache, not intractable  Patient reported that she has headache when her blood pressure increased, but she does not have any other neurological symptoms such as dizziness, vision changes, nausea, vomiting, weakness or sensation change or tingling, numbness. She stated that her 's cousin was diagnosed with brain tumor recently and she also concerned that she will have brain tumor. I reviewed the signs and symptoms of headache and other neurological symptoms with her. I do not think that she has any brain tumor causing the headache had headache is related to elevated blood pressure. However, I told her that if she has any new neurological symptoms or the worst headache in her life, she should go to ER as soon as possible for further evaluation. We will try  "Tylenol 500 mg 3 times a day when necessary for headache. If her symptoms do not improve or headache gets worse or she has any new neurological signs or symptoms, we will consider to have CT head or MRI head. Patient and  agreed with the plan.         Current medicines (including changes today)  Current Outpatient Prescriptions   Medication Sig Dispense Refill   • cyanocobalamin (VITAMIN B-12) 500 MCG Tab Take 500 mcg by mouth every day.     • losartan (COZAAR) 25 MG Tab Take 1 Tab by mouth every day. 90 Tab 3   • carvedilol (COREG) 6.25 MG Tab TAKE 1 TABLET TWICE A DAY WITH MEALS 180 Tab 0   • omeprazole (PRILOSEC) 20 MG delayed-release capsule TAKE 1 CAPSULE DAILY 90 Cap 0   • beclomethasone (QVAR) 80 MCG/ACT inhaler Inhale 1 Puff by mouth 2 times a day.     • atorvastatin (LIPITOR) 10 MG Tab TAKE 1 TABLET DAILY 90 Tab 3   • Multiple Vitamins-Minerals (CENTRUM SILVER ADULT 50+ PO) Take  by mouth.     • levalbuterol (XOPENEX HFA) 45 MCG/ACT inhaler Inhale 1-2 Puffs by mouth every four hours as needed for Shortness of Breath.       No current facility-administered medications for this visit.      She  has a past medical history of Asthma; Diabetes ([facility]); and GERD (gastroesophageal reflux disease).    ROS   No chest pain, no shortness of breath, no abdominal pain       Objective:     Blood pressure 130/75, pulse 66, temperature 37.1 °C (98.8 °F), height 1.511 m (4' 11.5\"), weight 50.8 kg (112 lb), SpO2 96 %, not currently breastfeeding. Body mass index is 22.24 kg/m².   Physical Exam:  General: Alert, oriented and no acute distress.  Eye contact is good, speech goal directed, affect calm  HEENT: conjunctiva non-injected, sclera non-icteric.  Oral mucous membranes pink and moist with no lesions.  Pinna normal.   Lungs: Normal respiratory effort, clear to auscultation bilaterally with good excursion.  CV: regular rate and rhythm. No murmurs.   Abdomen: soft, non distended, nontender, Bowel sound " normal.  Ext: no edema, color normal, vascularity normal, temperature normal        Assessment and Plan:   The following treatment plan was discussed     1. Essential hypertension  - Patient will continue carvedilol 6.25 mg twice a day. Add on losartan 25 mg daily to take once tablet every evening. Patient is advised to check her blood pressure and pulse twice a day. If her blood pressure is lower than 120/60, she will cut down losartan 25 mg to half tablet every evening. If she has blood pressure higher than 150/90, she will take additional half pill of losartan.  - Discussed and reviewed DASH diet.  - losartan (COZAAR) 25 MG Tab; Take 1 Tab by mouth every day.  Dispense: 90 Tab; Refill: 3    2. IFG (impaired fasting glucose)  - Advised to eat low fat, low carbohydrate and high fiber diet as well as do cardio physical exercise regularly.     3. Dyslipidemia  - Well controlled. Continue Lipitor 10 mg every evening.    4. Episodic tension-type headache, not intractable  - Less likely due to high blood pressure. She does not have any neurological signs or symptoms or nausea, vomiting currently. No focal or neurological deficits on exam.  - Patient is advised to take Tylenol 500 mg 3 times a day when necessary for headache.  - Patient is advised to seek urgent medical attention if she has worsening headache or the worst headache in her life or has weakness, decreased sensation or any new neurological signs or symptoms or ataxia.  - Patient agreed to wait for CT brain or MRI brain. She will need to do those imaging study if her headache gets worse or does not improve with above treatment or headache is persistent even blood pressure is normal or she has any neurological signs or symptoms or ataxia.  - Patient and  state understanding and agree with the plan.      Followup: Return in about 4 weeks (around 5/1/2018), or if symptoms worsen or fail to improve, for hypertension, prediabetes, dyslipidemia, lab  review.      Please note that this dictation was created using voice recognition software. I have made every reasonable attempt to correct obvious errors, but I expect that there may have unintended errors in text, spelling, punctuation, or grammar that I did not discover.

## 2018-04-03 NOTE — PATIENT INSTRUCTIONS
"DASH Eating Plan  DASH stands for \"Dietary Approaches to Stop Hypertension.\" The DASH eating plan is a healthy eating plan that has been shown to reduce high blood pressure (hypertension). Additional health benefits may include reducing the risk of type 2 diabetes mellitus, heart disease, and stroke. The DASH eating plan may also help with weight loss.  What do I need to know about the DASH eating plan?  For the DASH eating plan, you will follow these general guidelines:  · Choose foods with less than 150 milligrams of sodium per serving (as listed on the food label).  · Use salt-free seasonings or herbs instead of table salt or sea salt.  · Check with your health care provider or pharmacist before using salt substitutes.  · Eat lower-sodium products. These are often labeled as \"low-sodium\" or \"no salt added.\"  · Eat fresh foods. Avoid eating a lot of canned foods.  · Eat more vegetables, fruits, and low-fat dairy products.  · Choose whole grains. Look for the word \"whole\" as the first word in the ingredient list.  · Choose fish and skinless chicken or turkey more often than red meat. Limit fish, poultry, and meat to 6 oz (170 g) each day.  · Limit sweets, desserts, sugars, and sugary drinks.  · Choose heart-healthy fats.  · Eat more home-cooked food and less restaurant, buffet, and fast food.  · Limit fried foods.  · Do not devine foods. Cook foods using methods such as baking, boiling, grilling, and broiling instead.  · When eating at a restaurant, ask that your food be prepared with less salt, or no salt if possible.  What foods can I eat?  Seek help from a dietitian for individual calorie needs.  Grains   Whole grain or whole wheat bread. Brown rice. Whole grain or whole wheat pasta. Quinoa, bulgur, and whole grain cereals. Low-sodium cereals. Corn or whole wheat flour tortillas. Whole grain cornbread. Whole grain crackers. Low-sodium crackers.  Vegetables   Fresh or frozen vegetables (raw, steamed, roasted, or " grilled). Low-sodium or reduced-sodium tomato and vegetable juices. Low-sodium or reduced-sodium tomato sauce and paste. Low-sodium or reduced-sodium canned vegetables.  Fruits   All fresh, canned (in natural juice), or frozen fruits.  Meat and Other Protein Products   Ground beef (85% or leaner), grass-fed beef, or beef trimmed of fat. Skinless chicken or turkey. Ground chicken or turkey. Pork trimmed of fat. All fish and seafood. Eggs. Dried beans, peas, or lentils. Unsalted nuts and seeds. Unsalted canned beans.  Dairy   Low-fat dairy products, such as skim or 1% milk, 2% or reduced-fat cheeses, low-fat ricotta or cottage cheese, or plain low-fat yogurt. Low-sodium or reduced-sodium cheeses.  Fats and Oils   Tub margarines without trans fats. Light or reduced-fat mayonnaise and salad dressings (reduced sodium). Avocado. Safflower, olive, or canola oils. Natural peanut or almond butter.  Other   Unsalted popcorn and pretzels.  The items listed above may not be a complete list of recommended foods or beverages. Contact your dietitian for more options.   What foods are not recommended?  Grains   White bread. White pasta. White rice. Refined cornbread. Bagels and croissants. Crackers that contain trans fat.  Vegetables   Creamed or fried vegetables. Vegetables in a cheese sauce. Regular canned vegetables. Regular canned tomato sauce and paste. Regular tomato and vegetable juices.  Fruits   Canned fruit in light or heavy syrup. Fruit juice.  Meat and Other Protein Products   Fatty cuts of meat. Ribs, chicken wings, chappell, sausage, bologna, salami, chitterlings, fatback, hot dogs, bratwurst, and packaged luncheon meats. Salted nuts and seeds. Canned beans with salt.  Dairy   Whole or 2% milk, cream, half-and-half, and cream cheese. Whole-fat or sweetened yogurt. Full-fat cheeses or blue cheese. Nondairy creamers and whipped toppings. Processed cheese, cheese spreads, or cheese curds.  Condiments   Onion and garlic  salt, seasoned salt, table salt, and sea salt. Canned and packaged gravies. Worcestershire sauce. Tartar sauce. Barbecue sauce. Teriyaki sauce. Soy sauce, including reduced sodium. Steak sauce. Fish sauce. Oyster sauce. Cocktail sauce. Horseradish. Ketchup and mustard. Meat flavorings and tenderizers. Bouillon cubes. Hot sauce. Tabasco sauce. Marinades. Taco seasonings. Relishes.  Fats and Oils   Butter, stick margarine, lard, shortening, ghee, and chappell fat. Coconut, palm kernel, or palm oils. Regular salad dressings.  Other   Pickles and olives. Salted popcorn and pretzels.  The items listed above may not be a complete list of foods and beverages to avoid. Contact your dietitian for more information.   Where can I find more information?  [employer] [employer] [place]: www.nhlbi.nih.gov/health/health-topics/topics/dash/  This information is not intended to replace advice given to you by your health care provider. Make sure you discuss any questions you have with your health care provider.  Document Released: 12/06/2012 Document Revised: 05/25/2017 Document Reviewed: 10/22/2014  Elsevier Interactive Patient Education © 2017 [employer]

## 2018-04-03 NOTE — ASSESSMENT & PLAN NOTE
Patient is taking Lipitor 10 mg every evening. She denies side effects from taking it. Her cholesterol level is stable and well controlled.

## 2018-04-03 NOTE — ASSESSMENT & PLAN NOTE
Patient stated that she tried to eat low sugar and low carbohydrate diet and she also avoid eating red meat already. She is eating oatmeal every morning.

## 2018-04-03 NOTE — ASSESSMENT & PLAN NOTE
Patient presented to clinic with her  stated that her blood pressure is high at home for a couple weeks. She is taking carvedilol 6.25 mg twice a day. She stated that she could not take aspirin, even with baby aspirin 81 mg daily, as aspirin caused severe acid reflux and nausea on her.   She stated that her blood pressure reading was 160/70 last night and she had headache as well with high blood pressure. She denied chest pain, shortness of breath or dizziness or weakness or decreased sensation or any neurological symptoms. Patient stated that she has intermittent blood pressure high, above 150/90 at home. She denies side effects from taking carvedilol.

## 2018-04-24 ENCOUNTER — HOSPITAL ENCOUNTER (OUTPATIENT)
Dept: LAB | Facility: MEDICAL CENTER | Age: 73
End: 2018-04-24
Attending: INTERNAL MEDICINE
Payer: MEDICARE

## 2018-04-24 DIAGNOSIS — R73.01 IFG (IMPAIRED FASTING GLUCOSE): ICD-10-CM

## 2018-04-24 DIAGNOSIS — I10 ESSENTIAL HYPERTENSION: ICD-10-CM

## 2018-04-24 DIAGNOSIS — E78.5 DYSLIPIDEMIA: ICD-10-CM

## 2018-04-24 LAB
ALBUMIN SERPL BCP-MCNC: 3.9 G/DL (ref 3.2–4.9)
ALBUMIN/GLOB SERPL: 1.1 G/DL
ALP SERPL-CCNC: 51 U/L (ref 30–99)
ALT SERPL-CCNC: 18 U/L (ref 2–50)
ANION GAP SERPL CALC-SCNC: 10 MMOL/L (ref 0–11.9)
AST SERPL-CCNC: 20 U/L (ref 12–45)
BILIRUB SERPL-MCNC: 0.5 MG/DL (ref 0.1–1.5)
BUN SERPL-MCNC: 16 MG/DL (ref 8–22)
CALCIUM SERPL-MCNC: 9.9 MG/DL (ref 8.5–10.5)
CHLORIDE SERPL-SCNC: 103 MMOL/L (ref 96–112)
CHOLEST SERPL-MCNC: 150 MG/DL (ref 100–199)
CO2 SERPL-SCNC: 26 MMOL/L (ref 20–33)
CREAT SERPL-MCNC: 0.81 MG/DL (ref 0.5–1.4)
EST. AVERAGE GLUCOSE BLD GHB EST-MCNC: 137 MG/DL
GLOBULIN SER CALC-MCNC: 3.5 G/DL (ref 1.9–3.5)
GLUCOSE SERPL-MCNC: 111 MG/DL (ref 65–99)
HBA1C MFR BLD: 6.4 % (ref 0–5.6)
HDLC SERPL-MCNC: 61 MG/DL
LDLC SERPL CALC-MCNC: 62 MG/DL
POTASSIUM SERPL-SCNC: 4.3 MMOL/L (ref 3.6–5.5)
PROT SERPL-MCNC: 7.4 G/DL (ref 6–8.2)
SODIUM SERPL-SCNC: 139 MMOL/L (ref 135–145)
TRIGL SERPL-MCNC: 135 MG/DL (ref 0–149)

## 2018-04-24 PROCEDURE — 83036 HEMOGLOBIN GLYCOSYLATED A1C: CPT | Mod: GA

## 2018-04-24 PROCEDURE — 36415 COLL VENOUS BLD VENIPUNCTURE: CPT

## 2018-04-24 PROCEDURE — 80061 LIPID PANEL: CPT

## 2018-04-24 PROCEDURE — 80053 COMPREHEN METABOLIC PANEL: CPT

## 2018-05-08 ENCOUNTER — OFFICE VISIT (OUTPATIENT)
Dept: MEDICAL GROUP | Age: 73
End: 2018-05-08
Payer: MEDICARE

## 2018-05-08 VITALS
HEART RATE: 74 BPM | WEIGHT: 110 LBS | BODY MASS INDEX: 21.6 KG/M2 | DIASTOLIC BLOOD PRESSURE: 76 MMHG | TEMPERATURE: 98.7 F | OXYGEN SATURATION: 98 % | SYSTOLIC BLOOD PRESSURE: 118 MMHG | HEIGHT: 60 IN

## 2018-05-08 DIAGNOSIS — R73.01 IFG (IMPAIRED FASTING GLUCOSE): ICD-10-CM

## 2018-05-08 DIAGNOSIS — Z23 NEED FOR TDAP VACCINATION: ICD-10-CM

## 2018-05-08 DIAGNOSIS — J45.30 MILD PERSISTENT ASTHMA WITHOUT COMPLICATION: ICD-10-CM

## 2018-05-08 DIAGNOSIS — I10 ESSENTIAL HYPERTENSION: ICD-10-CM

## 2018-05-08 DIAGNOSIS — E78.5 DYSLIPIDEMIA: ICD-10-CM

## 2018-05-08 PROBLEM — R01.1 UNDIAGNOSED CARDIAC MURMURS: Status: RESOLVED | Noted: 2018-02-14 | Resolved: 2018-05-08

## 2018-05-08 PROCEDURE — 99214 OFFICE O/P EST MOD 30 MIN: CPT | Performed by: INTERNAL MEDICINE

## 2018-05-08 RX ORDER — CLINDAMYCIN HYDROCHLORIDE 150 MG/1
CAPSULE ORAL
Status: ON HOLD | COMMUNITY
Start: 2018-05-01 | End: 2018-11-15

## 2018-05-08 RX ORDER — HYDROCODONE BITARTRATE AND ACETAMINOPHEN 5; 325 MG/1; MG/1
TABLET ORAL
COMMUNITY
Start: 2018-05-01 | End: 2018-11-20

## 2018-05-08 RX ORDER — ONDANSETRON 4 MG/1
TABLET, ORALLY DISINTEGRATING ORAL
COMMUNITY
Start: 2018-05-01 | End: 2018-11-20

## 2018-05-08 NOTE — PROGRESS NOTES
Subjective:   Terrell Bangura is a 73 y.o. female here today for evaluation and management of:      Dyslipidemia  Patient is taking Lipitor 10 mg every evening. She tolerates medication without side effects. Her cholesterol is well-controlled. She stated that she does not have physical exercise as usual lately.    Results for TERRELL BANGURA (MRN [id number]) as of 5/8/2018 16:32   Ref. Range 4/24/2018 06:51   Cholesterol,Tot Latest Ref Range: 100 - 199 mg/dL 150   Triglycerides Latest Ref Range: 0 - 149 mg/dL 135   HDL Latest Ref Range: >=40 mg/dL 61   LDL Latest Ref Range: <100 mg/dL 62       Essential hypertension  Patient is taking carvedilol 6.25 mg twice a day. We added on 25 mg daily on 4/2/18 as she had intermittent high blood pressure episode. Patient stated that her blood pressure has been back to her baseline and within normal by taking carvedilol only. So she stopped taking losartan already. We will discontinue losartan from her medication list. Her blood pressure and pulse are stable and well controlled with carvedilol. Kidney functions and electrolytes on 4/24/18 are within normal.    IFG (impaired fasting glucose)  Patient reported that she eats a lot of rasin and banana daily. She had dental procedure and had pain on her teeth so she ate most soft diet lately with more carbohydrate. A1c increased to 6.4 on 4/24/18.    Mild persistent asthma without complication  Patient stated that she still needs to take Qvar inhaler one puff twice a day as prescribed by allergy specialist. She does not require to take Xopenex inhaler. . She denies wheezing or shortness of breath.         Current medicines (including changes today)  Current Outpatient Prescriptions   Medication Sig Dispense Refill   • clindamycin (CLEOCIN) 150 MG Cap      • HYDROcodone-acetaminophen (NORCO) 5-325 MG Tab per tablet      • ondansetron (ZOFRAN ODT) 4 MG TABLET DISPERSIBLE      • tetanus-dipth-acell pertussis (ADACEL) 5-2-15.5  "LF-MCG/0.5 Suspension 0.5 mL by Intramuscular route Once PRN for up to 1 dose. 0.5 mL 0   • cyanocobalamin (VITAMIN B-12) 500 MCG Tab Take 500 mcg by mouth every day.     • carvedilol (COREG) 6.25 MG Tab TAKE 1 TABLET TWICE A DAY WITH MEALS 180 Tab 0   • omeprazole (PRILOSEC) 20 MG delayed-release capsule TAKE 1 CAPSULE DAILY 90 Cap 0   • beclomethasone (QVAR) 80 MCG/ACT inhaler Inhale 1 Puff by mouth 2 times a day.     • atorvastatin (LIPITOR) 10 MG Tab TAKE 1 TABLET DAILY 90 Tab 3   • Multiple Vitamins-Minerals (CENTRUM SILVER ADULT 50+ PO) Take  by mouth.     • levalbuterol (XOPENEX HFA) 45 MCG/ACT inhaler Inhale 1-2 Puffs by mouth every four hours as needed for Shortness of Breath.       No current facility-administered medications for this visit.      She  has a past medical history of Asthma; Diabetes (HCC); and GERD (gastroesophageal reflux disease).    ROS   No chest pain, no shortness of breath, no abdominal pain       Objective:     Blood pressure 118/76, pulse 74, temperature 37.1 °C (98.7 °F), height 1.511 m (4' 11.5\"), weight 49.9 kg (110 lb), SpO2 98 %, not currently breastfeeding. Body mass index is 21.85 kg/m².   Physical Exam:  General: Alert, oriented and no acute distress.  Eye contact is good, speech goal directed, affect calm  HEENT: conjunctiva non-injected, sclera non-icteric.  Oral mucous membranes pink and moist with no lesions.  Pinna normal.   Lungs: Normal respiratory effort, clear to auscultation bilaterally with good excursion.  CV: regular rate and rhythm. No murmurs.  Abdomen: soft, non distended, nontender, Bowel sound normal.  Ext: no edema, color normal, vascularity normal, temperature normal      Assessment and Plan:   The following treatment plan was discussed     1. Dyslipidemia  - Well-controlled. Continue current regimens. Recheck lab 1-2 weeks before next follow up visit.  - Advised to eat low fat, low carbohydrate and high fiber diet as well as do cardio physical exercise " regularly.   - COMP METABOLIC PANEL; Future  - LIPID PROFILE; Future    2. Essential hypertension  - Improved. Discontinue losartan. She will continue carvedilol 6.25 mg twice a day.  - Recommend to monitor blood pressure and heart rate at home.  - CBC WITH DIFFERENTIAL; Future  - COMP METABOLIC PANEL; Future    3. IFG (impaired fasting glucose)  - Not well-controlled. Patient is noncompliance with low carbohydrate diet.  - Discussed to avoid dry fruits, starches diet and ice cream. Recommend to do regular physical exercise.  - COMP METABOLIC PANEL; Future  - HEMOGLOBIN A1C; Future    4. Mild persistent asthma without complication  - Stable. Continue Qvar inhaler as prescribed by allergy specialist.    5. Need for Tdap vaccination  - Tdap vaccine was prescribed today after reviewing risks and benefits as well as side effects of vaccine.  - tetanus-dipth-acell pertussis (ADACEL) 5-2-15.5 LF-MCG/0.5 Suspension; 0.5 mL by Intramuscular route Once PRN for up to 1 dose.  Dispense: 0.5 mL; Refill: 0      Followup: Return in about 6 months (around 11/8/2018), or if symptoms worsen or fail to improve, for hypertension, dyslipidemia, asthma, prediabetes, lab review.      Please note that this dictation was created using voice recognition software. I have made every reasonable attempt to correct obvious errors, but I expect that there may have unintended errors in text, spelling, punctuation, or grammar that I did not discover.

## 2018-05-08 NOTE — ASSESSMENT & PLAN NOTE
Patient reported that she eats a lot of rasin and banana daily. She had dental procedure and had pain on her teeth so she ate most soft diet lately with more carbohydrate. A1c increased to 6.4 on 4/24/18.

## 2018-05-08 NOTE — ASSESSMENT & PLAN NOTE
Patient is taking carvedilol 6.25 mg twice a day. We added on 25 mg daily on 4/2/18 as she had intermittent high blood pressure episode. Patient stated that her blood pressure has been back to her baseline and within normal by taking carvedilol only. So she stopped taking losartan already. We will discontinue losartan from her medication list. Her blood pressure and pulse are stable and well controlled with carvedilol. Kidney functions and electrolytes on 4/24/18 are within normal.

## 2018-05-08 NOTE — ASSESSMENT & PLAN NOTE
Patient stated that she still needs to take Qvar inhaler one puff twice a day as prescribed by allergy specialist. She does not require to take Xopenex inhaler. . She denies wheezing or shortness of breath.

## 2018-05-08 NOTE — ASSESSMENT & PLAN NOTE
Patient is taking Lipitor 10 mg every evening. She tolerates medication without side effects. Her cholesterol is well-controlled. She stated that she does not have physical exercise as usual lately.    Results for TERRELL MULLER (MRN [id number]) as of 5/8/2018 16:32   Ref. Range 4/24/2018 06:51   Cholesterol,Tot Latest Ref Range: 100 - 199 mg/dL 150   Triglycerides Latest Ref Range: 0 - 149 mg/dL 135   HDL Latest Ref Range: >=40 mg/dL 61   LDL Latest Ref Range: <100 mg/dL 62

## 2018-05-13 DIAGNOSIS — I10 ESSENTIAL HYPERTENSION: ICD-10-CM

## 2018-05-13 DIAGNOSIS — K21.9 GASTROESOPHAGEAL REFLUX DISEASE WITHOUT ESOPHAGITIS: ICD-10-CM

## 2018-05-14 RX ORDER — CARVEDILOL 6.25 MG/1
TABLET ORAL
Qty: 180 TAB | Refills: 3 | Status: SHIPPED | OUTPATIENT
Start: 2018-05-14 | End: 2019-05-06 | Stop reason: SDUPTHER

## 2018-05-14 RX ORDER — OMEPRAZOLE 20 MG/1
CAPSULE, DELAYED RELEASE ORAL
Qty: 90 CAP | Refills: 3 | Status: ON HOLD | OUTPATIENT
Start: 2018-05-14 | End: 2018-11-15

## 2018-09-02 ENCOUNTER — OFFICE VISIT (OUTPATIENT)
Dept: URGENT CARE | Facility: PHYSICIAN GROUP | Age: 73
End: 2018-09-02
Payer: MEDICARE

## 2018-09-02 ENCOUNTER — HOSPITAL ENCOUNTER (OUTPATIENT)
Facility: MEDICAL CENTER | Age: 73
End: 2018-09-02
Attending: NURSE PRACTITIONER
Payer: MEDICARE

## 2018-09-02 VITALS
HEART RATE: 65 BPM | RESPIRATION RATE: 14 BRPM | HEIGHT: 60 IN | TEMPERATURE: 99 F | DIASTOLIC BLOOD PRESSURE: 72 MMHG | SYSTOLIC BLOOD PRESSURE: 118 MMHG | WEIGHT: 109 LBS | OXYGEN SATURATION: 99 % | BODY MASS INDEX: 21.4 KG/M2

## 2018-09-02 DIAGNOSIS — N30.01 ACUTE CYSTITIS WITH HEMATURIA: ICD-10-CM

## 2018-09-02 DIAGNOSIS — R30.0 DYSURIA: ICD-10-CM

## 2018-09-02 LAB
APPEARANCE UR: NORMAL
BILIRUB UR STRIP-MCNC: NORMAL MG/DL
COLOR UR AUTO: YELLOW
GLUCOSE UR STRIP.AUTO-MCNC: NORMAL MG/DL
KETONES UR STRIP.AUTO-MCNC: NORMAL MG/DL
LEUKOCYTE ESTERASE UR QL STRIP.AUTO: NORMAL
NITRITE UR QL STRIP.AUTO: NORMAL
PH UR STRIP.AUTO: 7 [PH] (ref 5–8)
PROT UR QL STRIP: NORMAL MG/DL
RBC UR QL AUTO: NORMAL
SP GR UR STRIP.AUTO: 1.02
UROBILINOGEN UR STRIP-MCNC: NORMAL MG/DL

## 2018-09-02 PROCEDURE — 81002 URINALYSIS NONAUTO W/O SCOPE: CPT | Performed by: NURSE PRACTITIONER

## 2018-09-02 PROCEDURE — 87086 URINE CULTURE/COLONY COUNT: CPT

## 2018-09-02 PROCEDURE — 87186 SC STD MICRODIL/AGAR DIL: CPT

## 2018-09-02 PROCEDURE — 99214 OFFICE O/P EST MOD 30 MIN: CPT | Performed by: NURSE PRACTITIONER

## 2018-09-02 PROCEDURE — 87077 CULTURE AEROBIC IDENTIFY: CPT

## 2018-09-02 RX ORDER — NITROFURANTOIN 25; 75 MG/1; MG/1
100 CAPSULE ORAL EVERY 12 HOURS
Qty: 14 CAP | Refills: 0 | Status: CANCELLED | OUTPATIENT
Start: 2018-09-02 | End: 2018-09-09

## 2018-09-02 RX ORDER — SULFAMETHOXAZOLE AND TRIMETHOPRIM 800; 160 MG/1; MG/1
1 TABLET ORAL EVERY 12 HOURS
Qty: 6 TAB | Refills: 0 | Status: SHIPPED | OUTPATIENT
Start: 2018-09-02 | End: 2018-09-05

## 2018-09-02 ASSESSMENT — ENCOUNTER SYMPTOMS
NAUSEA: 0
CONSTIPATION: 0
FLANK PAIN: 0
BACK PAIN: 0
VOMITING: 0
MYALGIAS: 0
HEADACHES: 0
DIZZINESS: 0
ABDOMINAL PAIN: 0
DIARRHEA: 0
FEVER: 0
CHILLS: 0
WEAKNESS: 0

## 2018-09-02 NOTE — PROGRESS NOTES
Subjective:      Ila Bangura is a 73 y.o. female who presents with Dysuria (x 2 days)            HPI  Ila is here for dysuria x 2 days. Denies fever, n/v, abdominal or back pain, no vaginal irritation. H/o diabetes.    PMH:  has a past medical history of Asthma; Diabetes (HCC); and GERD (gastroesophageal reflux disease).  MEDS:   Current Outpatient Prescriptions:   •  sulfamethoxazole-trimethoprim (BACTRIM DS) 800-160 MG tablet, Take 1 Tab by mouth every 12 hours for 3 days., Disp: 6 Tab, Rfl: 0  •  carvedilol (COREG) 6.25 MG Tab, TAKE 1 TABLET TWICE A DAY WITH MEALS, Disp: 180 Tab, Rfl: 3  •  omeprazole (PRILOSEC) 20 MG delayed-release capsule, TAKE 1 CAPSULE DAILY, Disp: 90 Cap, Rfl: 3  •  beclomethasone (QVAR) 80 MCG/ACT inhaler, Inhale 1 Puff by mouth 2 times a day., Disp: , Rfl:   •  atorvastatin (LIPITOR) 10 MG Tab, TAKE 1 TABLET DAILY, Disp: 90 Tab, Rfl: 3  •  levalbuterol (XOPENEX HFA) 45 MCG/ACT inhaler, Inhale 1-2 Puffs by mouth every four hours as needed for Shortness of Breath., Disp: , Rfl:   •  clindamycin (CLEOCIN) 150 MG Cap, , Disp: , Rfl:   •  HYDROcodone-acetaminophen (NORCO) 5-325 MG Tab per tablet, , Disp: , Rfl:   •  ondansetron (ZOFRAN ODT) 4 MG TABLET DISPERSIBLE, , Disp: , Rfl:   •  tetanus-dipth-acell pertussis (ADACEL) 5-2-15.5 LF-MCG/0.5 Suspension, 0.5 mL by Intramuscular route Once PRN for up to 1 dose., Disp: 0.5 mL, Rfl: 0  •  cyanocobalamin (VITAMIN B-12) 500 MCG Tab, Take 500 mcg by mouth every day., Disp: , Rfl:   •  Multiple Vitamins-Minerals (CENTRUM SILVER ADULT 50+ PO), Take  by mouth., Disp: , Rfl:   ALLERGIES:   Allergies   Allergen Reactions   • Augmentin Hives     Hard to breath   • Aspirin Nausea     Heart burn, even with omeprazole     SURGHX:   Past Surgical History:   Procedure Laterality Date   • DENTAL SURGERY  2016    bone graft   • COLONOSCOPY      negative   • UPPER OR LOWER GI PROCEDURE WITH ANESTHESIA      x 2 upper GI     SOCHX:  reports that she has  never smoked. She has never used smokeless tobacco. She reports that she does not drink alcohol or use drugs.  FH: Family history was reviewed, no pertinent findings to report    Review of Systems   Constitutional: Negative for chills, fever and malaise/fatigue.   Gastrointestinal: Negative for abdominal pain, constipation, diarrhea, nausea and vomiting.   Genitourinary: Positive for dysuria, frequency and urgency. Negative for flank pain and hematuria.   Musculoskeletal: Negative for back pain and myalgias.   Skin: Negative for itching and rash.   Neurological: Negative for dizziness, weakness and headaches.   All other systems reviewed and are negative.         Objective:     /72   Pulse 65   Temp 37.2 °C (99 °F)   Resp 14   Ht 1.524 m (5')   Wt 49.4 kg (109 lb)   SpO2 99%   BMI 21.29 kg/m²      Physical Exam   Constitutional: She is oriented to person, place, and time. She appears well-developed and well-nourished. She is active and cooperative.  Non-toxic appearance. She does not have a sickly appearance. She does not appear ill. No distress.   HENT:   Head: Normocephalic.   Eyes: Pupils are equal, round, and reactive to light. Conjunctivae and EOM are normal.   Neck: Normal range of motion. Neck supple.   Cardiovascular: Normal rate and regular rhythm.    Pulmonary/Chest: Effort normal and breath sounds normal.   Abdominal: Soft. Bowel sounds are normal. She exhibits no distension and no ascites. There is no tenderness. There is no rigidity, no rebound, no guarding and no CVA tenderness.   Musculoskeletal: Normal range of motion.   Lymphadenopathy:     She has no cervical adenopathy.   Neurological: She is alert and oriented to person, place, and time.   Skin: Skin is warm and dry. She is not diaphoretic.   Vitals reviewed.              Assessment/Plan:     1. Acute cystitis with hematuria    - Urine Culture; Future  - sulfamethoxazole-trimethoprim (BACTRIM DS) 800-160 MG tablet; Take 1 Tab by  mouth every 12 hours for 3 days.  Dispense: 6 Tab; Refill: 0    2. Dysuria    - POCT Urinalysis    Increase water intake  Urinate more frequently and empty bladder completely  Practice good toileting hygiene after bowel movements and sexual intercourse, refrain from sexual intercourse until infection cleared  Monitor for back/flank pain, difficulty urinating, blood in urine- need re-evaluation    Call regarding urine culture

## 2018-09-04 ENCOUNTER — TELEPHONE (OUTPATIENT)
Dept: URGENT CARE | Facility: PHYSICIAN GROUP | Age: 73
End: 2018-09-04

## 2018-09-04 LAB
BACTERIA UR CULT: ABNORMAL
BACTERIA UR CULT: ABNORMAL
SIGNIFICANT IND 70042: ABNORMAL
SITE SITE: ABNORMAL
SOURCE SOURCE: ABNORMAL

## 2018-09-04 NOTE — TELEPHONE ENCOUNTER
Called and left message on phone regarding urine culture results to be POS for Citrobacter and is on the correct antibiotic, please finish.

## 2018-10-04 DIAGNOSIS — E78.5 DYSLIPIDEMIA: ICD-10-CM

## 2018-10-04 RX ORDER — ATORVASTATIN CALCIUM 10 MG/1
TABLET, FILM COATED ORAL
Qty: 90 TAB | Refills: 3 | Status: SHIPPED | OUTPATIENT
Start: 2018-10-04 | End: 2019-09-29 | Stop reason: SDUPTHER

## 2018-11-08 ENCOUNTER — HOSPITAL ENCOUNTER (OUTPATIENT)
Dept: LAB | Facility: MEDICAL CENTER | Age: 73
End: 2018-11-08
Attending: INTERNAL MEDICINE
Payer: MEDICARE

## 2018-11-08 DIAGNOSIS — E78.5 DYSLIPIDEMIA: ICD-10-CM

## 2018-11-08 DIAGNOSIS — R73.01 IFG (IMPAIRED FASTING GLUCOSE): ICD-10-CM

## 2018-11-08 DIAGNOSIS — I10 ESSENTIAL HYPERTENSION: ICD-10-CM

## 2018-11-08 LAB
ALBUMIN SERPL BCP-MCNC: 4.2 G/DL (ref 3.2–4.9)
ALBUMIN/GLOB SERPL: 1.2 G/DL
ALP SERPL-CCNC: 62 U/L (ref 30–99)
ALT SERPL-CCNC: 15 U/L (ref 2–50)
ANION GAP SERPL CALC-SCNC: 6 MMOL/L (ref 0–11.9)
AST SERPL-CCNC: 18 U/L (ref 12–45)
BASOPHILS # BLD AUTO: 0.9 % (ref 0–1.8)
BASOPHILS # BLD: 0.05 K/UL (ref 0–0.12)
BILIRUB SERPL-MCNC: 0.7 MG/DL (ref 0.1–1.5)
BUN SERPL-MCNC: 18 MG/DL (ref 8–22)
CALCIUM SERPL-MCNC: 9.7 MG/DL (ref 8.5–10.5)
CHLORIDE SERPL-SCNC: 105 MMOL/L (ref 96–112)
CHOLEST SERPL-MCNC: 166 MG/DL (ref 100–199)
CO2 SERPL-SCNC: 28 MMOL/L (ref 20–33)
CREAT SERPL-MCNC: 0.66 MG/DL (ref 0.5–1.4)
EOSINOPHIL # BLD AUTO: 0.28 K/UL (ref 0–0.51)
EOSINOPHIL NFR BLD: 5.3 % (ref 0–6.9)
ERYTHROCYTE [DISTWIDTH] IN BLOOD BY AUTOMATED COUNT: 41.1 FL (ref 35.9–50)
EST. AVERAGE GLUCOSE BLD GHB EST-MCNC: 137 MG/DL
FASTING STATUS PATIENT QL REPORTED: NORMAL
GLOBULIN SER CALC-MCNC: 3.4 G/DL (ref 1.9–3.5)
GLUCOSE SERPL-MCNC: 108 MG/DL (ref 65–99)
HBA1C MFR BLD: 6.4 % (ref 0–5.6)
HCT VFR BLD AUTO: 44 % (ref 37–47)
HDLC SERPL-MCNC: 72 MG/DL
HGB BLD-MCNC: 14.5 G/DL (ref 12–16)
IMM GRANULOCYTES # BLD AUTO: 0 K/UL (ref 0–0.11)
IMM GRANULOCYTES NFR BLD AUTO: 0 % (ref 0–0.9)
LDLC SERPL CALC-MCNC: 71 MG/DL
LYMPHOCYTES # BLD AUTO: 2.03 K/UL (ref 1–4.8)
LYMPHOCYTES NFR BLD: 38.2 % (ref 22–41)
MCH RBC QN AUTO: 31.8 PG (ref 27–33)
MCHC RBC AUTO-ENTMCNC: 33 G/DL (ref 33.6–35)
MCV RBC AUTO: 96.5 FL (ref 81.4–97.8)
MONOCYTES # BLD AUTO: 0.44 K/UL (ref 0–0.85)
MONOCYTES NFR BLD AUTO: 8.3 % (ref 0–13.4)
NEUTROPHILS # BLD AUTO: 2.51 K/UL (ref 2–7.15)
NEUTROPHILS NFR BLD: 47.3 % (ref 44–72)
NRBC # BLD AUTO: 0 K/UL
NRBC BLD-RTO: 0 /100 WBC
PLATELET # BLD AUTO: 231 K/UL (ref 164–446)
PMV BLD AUTO: 9.6 FL (ref 9–12.9)
POTASSIUM SERPL-SCNC: 4.2 MMOL/L (ref 3.6–5.5)
PROT SERPL-MCNC: 7.6 G/DL (ref 6–8.2)
RBC # BLD AUTO: 4.56 M/UL (ref 4.2–5.4)
SODIUM SERPL-SCNC: 139 MMOL/L (ref 135–145)
TRIGL SERPL-MCNC: 117 MG/DL (ref 0–149)
WBC # BLD AUTO: 5.3 K/UL (ref 4.8–10.8)

## 2018-11-08 PROCEDURE — 80053 COMPREHEN METABOLIC PANEL: CPT

## 2018-11-08 PROCEDURE — 80061 LIPID PANEL: CPT

## 2018-11-08 PROCEDURE — 36415 COLL VENOUS BLD VENIPUNCTURE: CPT

## 2018-11-08 PROCEDURE — 85025 COMPLETE CBC W/AUTO DIFF WBC: CPT

## 2018-11-08 PROCEDURE — 83036 HEMOGLOBIN GLYCOSYLATED A1C: CPT | Mod: GA

## 2018-11-14 ENCOUNTER — OFFICE VISIT (OUTPATIENT)
Dept: URGENT CARE | Facility: PHYSICIAN GROUP | Age: 73
End: 2018-11-14
Payer: MEDICARE

## 2018-11-14 ENCOUNTER — APPOINTMENT (OUTPATIENT)
Dept: RADIOLOGY | Facility: MEDICAL CENTER | Age: 73
End: 2018-11-14
Attending: EMERGENCY MEDICINE
Payer: MEDICARE

## 2018-11-14 ENCOUNTER — HOSPITAL ENCOUNTER (OUTPATIENT)
Facility: MEDICAL CENTER | Age: 73
End: 2018-11-15
Attending: EMERGENCY MEDICINE | Admitting: HOSPITALIST
Payer: MEDICARE

## 2018-11-14 VITALS
HEART RATE: 65 BPM | TEMPERATURE: 98.1 F | OXYGEN SATURATION: 98 % | BODY MASS INDEX: 21.79 KG/M2 | HEIGHT: 60 IN | WEIGHT: 111 LBS | DIASTOLIC BLOOD PRESSURE: 96 MMHG | RESPIRATION RATE: 16 BRPM | SYSTOLIC BLOOD PRESSURE: 178 MMHG

## 2018-11-14 DIAGNOSIS — I10 ELEVATED BLOOD PRESSURE READING IN OFFICE WITH DIAGNOSIS OF HYPERTENSION: ICD-10-CM

## 2018-11-14 DIAGNOSIS — R07.89 CHEST PAIN RADIATING TO ARM: Primary | ICD-10-CM

## 2018-11-14 LAB
ALBUMIN SERPL BCP-MCNC: 4.1 G/DL (ref 3.2–4.9)
ALBUMIN/GLOB SERPL: 1.5 G/DL
ALP SERPL-CCNC: 63 U/L (ref 30–99)
ALT SERPL-CCNC: 14 U/L (ref 2–50)
ANION GAP SERPL CALC-SCNC: 6 MMOL/L (ref 0–11.9)
AST SERPL-CCNC: 17 U/L (ref 12–45)
BASOPHILS # BLD AUTO: 0.8 % (ref 0–1.8)
BASOPHILS # BLD: 0.06 K/UL (ref 0–0.12)
BILIRUB SERPL-MCNC: 0.4 MG/DL (ref 0.1–1.5)
BNP SERPL-MCNC: 42 PG/ML (ref 0–100)
BUN SERPL-MCNC: 16 MG/DL (ref 8–22)
CALCIUM SERPL-MCNC: 9.2 MG/DL (ref 8.5–10.5)
CHLORIDE SERPL-SCNC: 107 MMOL/L (ref 96–112)
CO2 SERPL-SCNC: 24 MMOL/L (ref 20–33)
CREAT SERPL-MCNC: 0.67 MG/DL (ref 0.5–1.4)
EKG IMPRESSION: NORMAL
EOSINOPHIL # BLD AUTO: 0.22 K/UL (ref 0–0.51)
EOSINOPHIL NFR BLD: 3.1 % (ref 0–6.9)
ERYTHROCYTE [DISTWIDTH] IN BLOOD BY AUTOMATED COUNT: 39.2 FL (ref 35.9–50)
GLOBULIN SER CALC-MCNC: 2.8 G/DL (ref 1.9–3.5)
GLUCOSE SERPL-MCNC: 106 MG/DL (ref 65–99)
HCT VFR BLD AUTO: 42.4 % (ref 37–47)
HGB BLD-MCNC: 13.8 G/DL (ref 12–16)
IMM GRANULOCYTES # BLD AUTO: 0.02 K/UL (ref 0–0.11)
IMM GRANULOCYTES NFR BLD AUTO: 0.3 % (ref 0–0.9)
LIPASE SERPL-CCNC: 50 U/L (ref 11–82)
LYMPHOCYTES # BLD AUTO: 2.67 K/UL (ref 1–4.8)
LYMPHOCYTES NFR BLD: 37.5 % (ref 22–41)
MAGNESIUM SERPL-MCNC: 1.9 MG/DL (ref 1.5–2.5)
MCH RBC QN AUTO: 30.9 PG (ref 27–33)
MCHC RBC AUTO-ENTMCNC: 32.5 G/DL (ref 33.6–35)
MCV RBC AUTO: 95.1 FL (ref 81.4–97.8)
MONOCYTES # BLD AUTO: 0.56 K/UL (ref 0–0.85)
MONOCYTES NFR BLD AUTO: 7.9 % (ref 0–13.4)
NEUTROPHILS # BLD AUTO: 3.59 K/UL (ref 2–7.15)
NEUTROPHILS NFR BLD: 50.4 % (ref 44–72)
NRBC # BLD AUTO: 0 K/UL
NRBC BLD-RTO: 0 /100 WBC
PLATELET # BLD AUTO: 237 K/UL (ref 164–446)
PMV BLD AUTO: 9.4 FL (ref 9–12.9)
POTASSIUM SERPL-SCNC: 3.7 MMOL/L (ref 3.6–5.5)
PROT SERPL-MCNC: 6.9 G/DL (ref 6–8.2)
RBC # BLD AUTO: 4.46 M/UL (ref 4.2–5.4)
SODIUM SERPL-SCNC: 137 MMOL/L (ref 135–145)
TROPONIN I SERPL-MCNC: 0.01 NG/ML (ref 0–0.04)
TROPONIN I SERPL-MCNC: 0.01 NG/ML (ref 0–0.04)
WBC # BLD AUTO: 7.1 K/UL (ref 4.8–10.8)

## 2018-11-14 PROCEDURE — 94760 N-INVAS EAR/PLS OXIMETRY 1: CPT

## 2018-11-14 PROCEDURE — 96374 THER/PROPH/DIAG INJ IV PUSH: CPT

## 2018-11-14 PROCEDURE — 83690 ASSAY OF LIPASE: CPT

## 2018-11-14 PROCEDURE — A9270 NON-COVERED ITEM OR SERVICE: HCPCS | Performed by: EMERGENCY MEDICINE

## 2018-11-14 PROCEDURE — 71045 X-RAY EXAM CHEST 1 VIEW: CPT

## 2018-11-14 PROCEDURE — 83880 ASSAY OF NATRIURETIC PEPTIDE: CPT

## 2018-11-14 PROCEDURE — 80053 COMPREHEN METABOLIC PANEL: CPT

## 2018-11-14 PROCEDURE — 700111 HCHG RX REV CODE 636 W/ 250 OVERRIDE (IP): Performed by: EMERGENCY MEDICINE

## 2018-11-14 PROCEDURE — 83735 ASSAY OF MAGNESIUM: CPT

## 2018-11-14 PROCEDURE — G0378 HOSPITAL OBSERVATION PER HR: HCPCS

## 2018-11-14 PROCEDURE — 93005 ELECTROCARDIOGRAM TRACING: CPT

## 2018-11-14 PROCEDURE — 99285 EMERGENCY DEPT VISIT HI MDM: CPT

## 2018-11-14 PROCEDURE — 84484 ASSAY OF TROPONIN QUANT: CPT | Mod: 91

## 2018-11-14 PROCEDURE — 99214 OFFICE O/P EST MOD 30 MIN: CPT | Performed by: PHYSICIAN ASSISTANT

## 2018-11-14 PROCEDURE — 85025 COMPLETE CBC W/AUTO DIFF WBC: CPT

## 2018-11-14 PROCEDURE — 93005 ELECTROCARDIOGRAM TRACING: CPT | Performed by: EMERGENCY MEDICINE

## 2018-11-14 PROCEDURE — 93000 ELECTROCARDIOGRAM COMPLETE: CPT | Performed by: PHYSICIAN ASSISTANT

## 2018-11-14 PROCEDURE — 83036 HEMOGLOBIN GLYCOSYLATED A1C: CPT

## 2018-11-14 PROCEDURE — 36415 COLL VENOUS BLD VENIPUNCTURE: CPT

## 2018-11-14 PROCEDURE — 700102 HCHG RX REV CODE 250 W/ 637 OVERRIDE(OP): Performed by: EMERGENCY MEDICINE

## 2018-11-14 PROCEDURE — 99219 PR INITIAL OBSERVATION CARE,LEVL II: CPT | Performed by: HOSPITALIST

## 2018-11-14 RX ORDER — ASPIRIN 81 MG/1
324 TABLET, CHEWABLE ORAL ONCE
Status: COMPLETED | OUTPATIENT
Start: 2018-11-14 | End: 2018-11-14

## 2018-11-14 RX ORDER — ASPIRIN 300 MG/1
300 SUPPOSITORY RECTAL ONCE
Status: COMPLETED | OUTPATIENT
Start: 2018-11-14 | End: 2018-11-14

## 2018-11-14 RX ADMIN — ASPIRIN 324 MG: 81 TABLET, CHEWABLE ORAL at 21:15

## 2018-11-14 RX ADMIN — FAMOTIDINE 20 MG: 10 INJECTION, SOLUTION INTRAVENOUS at 21:12

## 2018-11-14 RX ADMIN — LIDOCAINE HYDROCHLORIDE 30 ML: 20 SOLUTION OROPHARYNGEAL at 21:12

## 2018-11-14 ASSESSMENT — ENCOUNTER SYMPTOMS
BACK PAIN: 0
COUGH: 0
SHORTNESS OF BREATH: 0
DIZZINESS: 0
EXERTIONAL CHEST PRESSURE: 0
ABDOMINAL PAIN: 0
PALPITATIONS: 0
PND: 0
WHEEZING: 0
SPUTUM PRODUCTION: 0
HEADACHES: 0
IRREGULAR HEARTBEAT: 0
NEAR-SYNCOPE: 0
DIAPHORESIS: 0
FEVER: 0

## 2018-11-14 ASSESSMENT — PAIN SCALES - GENERAL: PAINLEVEL_OUTOF10: 0

## 2018-11-15 ENCOUNTER — PATIENT OUTREACH (OUTPATIENT)
Dept: HEALTH INFORMATION MANAGEMENT | Facility: OTHER | Age: 73
End: 2018-11-15

## 2018-11-15 ENCOUNTER — APPOINTMENT (OUTPATIENT)
Dept: RADIOLOGY | Facility: MEDICAL CENTER | Age: 73
End: 2018-11-15
Attending: HOSPITALIST
Payer: MEDICARE

## 2018-11-15 VITALS
OXYGEN SATURATION: 100 % | HEART RATE: 75 BPM | TEMPERATURE: 98.6 F | DIASTOLIC BLOOD PRESSURE: 83 MMHG | SYSTOLIC BLOOD PRESSURE: 163 MMHG | RESPIRATION RATE: 17 BRPM | WEIGHT: 108.91 LBS | HEIGHT: 60 IN | BODY MASS INDEX: 21.38 KG/M2

## 2018-11-15 PROBLEM — R07.9 CHEST PAIN: Status: RESOLVED | Noted: 2018-11-15 | Resolved: 2018-11-15

## 2018-11-15 PROBLEM — R07.9 CHEST PAIN: Status: ACTIVE | Noted: 2018-11-15

## 2018-11-15 LAB
EST. AVERAGE GLUCOSE BLD GHB EST-MCNC: 143 MG/DL
HBA1C MFR BLD: 6.6 % (ref 0–5.6)
TROPONIN I SERPL-MCNC: 0.01 NG/ML (ref 0–0.04)
TROPONIN I SERPL-MCNC: <0.01 NG/ML (ref 0–0.04)

## 2018-11-15 PROCEDURE — 96372 THER/PROPH/DIAG INJ SC/IM: CPT | Mod: XU

## 2018-11-15 PROCEDURE — A9270 NON-COVERED ITEM OR SERVICE: HCPCS | Performed by: HOSPITALIST

## 2018-11-15 PROCEDURE — G0378 HOSPITAL OBSERVATION PER HR: HCPCS

## 2018-11-15 PROCEDURE — 99217 PR OBSERVATION CARE DISCHARGE: CPT | Performed by: INTERNAL MEDICINE

## 2018-11-15 PROCEDURE — 700105 HCHG RX REV CODE 258: Performed by: HOSPITALIST

## 2018-11-15 PROCEDURE — 84484 ASSAY OF TROPONIN QUANT: CPT

## 2018-11-15 PROCEDURE — 700102 HCHG RX REV CODE 250 W/ 637 OVERRIDE(OP): Performed by: HOSPITALIST

## 2018-11-15 PROCEDURE — 36415 COLL VENOUS BLD VENIPUNCTURE: CPT

## 2018-11-15 PROCEDURE — 700111 HCHG RX REV CODE 636 W/ 250 OVERRIDE (IP): Performed by: HOSPITALIST

## 2018-11-15 PROCEDURE — A9502 TC99M TETROFOSMIN: HCPCS

## 2018-11-15 RX ORDER — CARVEDILOL 6.25 MG/1
6.25 TABLET ORAL 2 TIMES DAILY WITH MEALS
Status: DISCONTINUED | OUTPATIENT
Start: 2018-11-15 | End: 2018-11-15 | Stop reason: HOSPADM

## 2018-11-15 RX ORDER — ASPIRIN 325 MG
325 TABLET ORAL DAILY
Status: DISCONTINUED | OUTPATIENT
Start: 2018-11-15 | End: 2018-11-15 | Stop reason: HOSPADM

## 2018-11-15 RX ORDER — ONDANSETRON 4 MG/1
4 TABLET, ORALLY DISINTEGRATING ORAL EVERY 4 HOURS PRN
Status: DISCONTINUED | OUTPATIENT
Start: 2018-11-15 | End: 2018-11-15 | Stop reason: HOSPADM

## 2018-11-15 RX ORDER — OMEPRAZOLE 20 MG/1
20 CAPSULE, DELAYED RELEASE ORAL
Status: DISCONTINUED | OUTPATIENT
Start: 2018-11-15 | End: 2018-11-15 | Stop reason: HOSPADM

## 2018-11-15 RX ORDER — OMEPRAZOLE 20 MG/1
20 CAPSULE, DELAYED RELEASE ORAL DAILY
Qty: 30 CAP | Refills: 2 | Status: SHIPPED | OUTPATIENT
Start: 2018-11-15 | End: 2019-08-09

## 2018-11-15 RX ORDER — FLUTICASONE PROPIONATE 110 UG/1
2 AEROSOL, METERED RESPIRATORY (INHALATION)
Status: DISCONTINUED | OUTPATIENT
Start: 2018-11-15 | End: 2018-11-15 | Stop reason: HOSPADM

## 2018-11-15 RX ORDER — AMOXICILLIN 250 MG
2 CAPSULE ORAL 2 TIMES DAILY
Status: DISCONTINUED | OUTPATIENT
Start: 2018-11-15 | End: 2018-11-15 | Stop reason: HOSPADM

## 2018-11-15 RX ORDER — POLYETHYLENE GLYCOL 3350 17 G/17G
1 POWDER, FOR SOLUTION ORAL
Status: DISCONTINUED | OUTPATIENT
Start: 2018-11-15 | End: 2018-11-15 | Stop reason: HOSPADM

## 2018-11-15 RX ORDER — ASPIRIN 300 MG/1
300 SUPPOSITORY RECTAL DAILY
Status: DISCONTINUED | OUTPATIENT
Start: 2018-11-15 | End: 2018-11-15 | Stop reason: HOSPADM

## 2018-11-15 RX ORDER — BISACODYL 10 MG
10 SUPPOSITORY, RECTAL RECTAL
Status: DISCONTINUED | OUTPATIENT
Start: 2018-11-15 | End: 2018-11-15 | Stop reason: HOSPADM

## 2018-11-15 RX ORDER — ATORVASTATIN CALCIUM 10 MG/1
10 TABLET, FILM COATED ORAL EVERY EVENING
Status: DISCONTINUED | OUTPATIENT
Start: 2018-11-15 | End: 2018-11-15 | Stop reason: HOSPADM

## 2018-11-15 RX ORDER — ONDANSETRON 2 MG/ML
4 INJECTION INTRAMUSCULAR; INTRAVENOUS EVERY 4 HOURS PRN
Status: DISCONTINUED | OUTPATIENT
Start: 2018-11-15 | End: 2018-11-15 | Stop reason: HOSPADM

## 2018-11-15 RX ORDER — ALBUTEROL SULFATE 90 UG/1
1-2 AEROSOL, METERED RESPIRATORY (INHALATION) EVERY 4 HOURS PRN
Status: DISCONTINUED | OUTPATIENT
Start: 2018-11-15 | End: 2018-11-15 | Stop reason: HOSPADM

## 2018-11-15 RX ORDER — REGADENOSON 0.08 MG/ML
INJECTION, SOLUTION INTRAVENOUS
Status: DISCONTINUED
Start: 2018-11-15 | End: 2018-11-15 | Stop reason: HOSPADM

## 2018-11-15 RX ORDER — ACETAMINOPHEN 325 MG/1
650 TABLET ORAL EVERY 6 HOURS PRN
Status: DISCONTINUED | OUTPATIENT
Start: 2018-11-15 | End: 2018-11-15 | Stop reason: HOSPADM

## 2018-11-15 RX ORDER — SODIUM CHLORIDE 9 MG/ML
INJECTION, SOLUTION INTRAVENOUS CONTINUOUS
Status: DISCONTINUED | OUTPATIENT
Start: 2018-11-15 | End: 2018-11-15 | Stop reason: HOSPADM

## 2018-11-15 RX ORDER — ASPIRIN 81 MG/1
324 TABLET, CHEWABLE ORAL DAILY
Status: DISCONTINUED | OUTPATIENT
Start: 2018-11-15 | End: 2018-11-15 | Stop reason: HOSPADM

## 2018-11-15 RX ADMIN — ASPIRIN 325 MG: 325 TABLET, COATED ORAL at 06:10

## 2018-11-15 RX ADMIN — SODIUM CHLORIDE: 9 INJECTION, SOLUTION INTRAVENOUS at 01:29

## 2018-11-15 RX ADMIN — CARVEDILOL 6.25 MG: 6.25 TABLET, FILM COATED ORAL at 08:59

## 2018-11-15 RX ADMIN — ENOXAPARIN SODIUM 40 MG: 100 INJECTION SUBCUTANEOUS at 06:10

## 2018-11-15 RX ADMIN — OMEPRAZOLE 20 MG: 20 CAPSULE, DELAYED RELEASE ORAL at 06:16

## 2018-11-15 ASSESSMENT — ENCOUNTER SYMPTOMS
CONSTITUTIONAL NEGATIVE: 1
PSYCHIATRIC NEGATIVE: 1
MUSCULOSKELETAL NEGATIVE: 1
EYES NEGATIVE: 1
GASTROINTESTINAL NEGATIVE: 1
RESPIRATORY NEGATIVE: 1
NEUROLOGICAL NEGATIVE: 1

## 2018-11-15 ASSESSMENT — PAIN SCALES - GENERAL
PAINLEVEL_OUTOF10: 0

## 2018-11-15 ASSESSMENT — PATIENT HEALTH QUESTIONNAIRE - PHQ9
2. FEELING DOWN, DEPRESSED, IRRITABLE, OR HOPELESS: NOT AT ALL
1. LITTLE INTEREST OR PLEASURE IN DOING THINGS: NOT AT ALL
SUM OF ALL RESPONSES TO PHQ9 QUESTIONS 1 AND 2: 0

## 2018-11-15 ASSESSMENT — LIFESTYLE VARIABLES
ALCOHOL_USE: NO
EVER_SMOKED: NEVER

## 2018-11-15 NOTE — DISCHARGE PLANNING
Care Transition Team Assessment    Spoke with patient at bedside. Anticipate no needs @ present time.    Information Source  Orientation : Oriented x 4  Information Given By: Patient    Readmission Evaluation  Is this a readmission?: No    Interdisciplinary Discharge Planning  Does Admitting Nurse Feel This Could be a Complex Discharge?: No  Primary Care Physician: Wendy Galan  Lives with - Patient's Self Care Capacity: Spouse  Patient or legal guardian wants to designate a caregiver (see row info): No  Support Systems: Spouse / Significant Other  Do You Take your Prescribed Medications Regularly: Yes  Able to Return to Previous ADL's: Yes  Mobility Issues: No  Prior Services: None  Patient Expects to be Discharged to:: Home  Assistance Needed: No  Durable Medical Equipment: Not Applicable    Anticipated Discharge Information  Anticipated discharge disposition: Home  Discharge Address: [facility] [address] [place]  Discharge Contact Phone Number: [phone]

## 2018-11-15 NOTE — ASSESSMENT & PLAN NOTE
Atypical, concern for underlying cardiac ischemia.  Plan for troponin trend, telemetry monitoring and, nuclear stress imaging study.

## 2018-11-15 NOTE — DISCHARGE INSTRUCTIONS
Discharge Instructions    Discharged to home by car with relative. Discharged via walking, hospital escort: Yes.  Special equipment needed: Not Applicable    Be sure to schedule a follow-up appointment with your primary care doctor or any specialists as instructed.     Discharge Plan:   Diet Plan: Discussed  Activity Level: Discussed  Confirmed Follow up Appointment: Patient to Call and Schedule Appointment  Confirmed Symptoms Management: Discussed  Medication Reconciliation Updated: Yes  Influenza Vaccine Indication: Not indicated: Previously immunized this influenza season and > 8 years of age    I understand that a diet low in cholesterol, fat, and sodium is recommended for good health. Unless I have been given specific instructions below for another diet, I accept this instruction as my diet prescription.   Other diet:     Special Instructions:  Patient may DC home.  FU with PCP.  Start taking Omeprazole every day instead of just as needed.    · Is patient discharged on Warfarin / Coumadin?   No     Depression / Suicide Risk    As you are discharged from [facility], it is important to learn how to keep safe from harming yourself.    Recognize the warning signs:  · Abrupt changes in personality, positive or negative- including increase in energy   · Giving away possessions  · Change in eating patterns- significant weight changes-  positive or negative  · Change in sleeping patterns- unable to sleep or sleeping all the time   · Unwillingness or inability to communicate  · Depression  · Unusual sadness, discouragement and loneliness  · Talk of wanting to die  · Neglect of personal appearance   · Rebelliousness- reckless behavior  · Withdrawal from people/activities they love  · Confusion- inability to concentrate     If you or a loved one observes any of these behaviors or has concerns about self-harm, here's what you can do:  · Talk about it- your feelings and reasons for harming  yourself  · Remove any means that you might use to hurt yourself (examples: pills, rope, extension cords, firearm)  · Get professional help from the community (Mental Health, Substance Abuse, psychological counseling)  · Do not be alone:Call your Safe Contact- someone whom you trust who will be there for you.  · Call your local CRISIS HOTLINE 016-1089 or 515-908-8961  · Call your local Children's Mobile Crisis Response Team [place] (281) 038-4201 or wwwCOSMIC COLOR  · Call the toll free National Suicide Prevention Hotlines   · National Suicide Prevention Lifeline 907-139-WEYH (6057)  · National Hope Line Network 800-SUICIDE (870-6338)    Chest Pain, Nonspecific  It is often hard to give a specific diagnosis for the cause of chest pain. There is always a chance that your pain could be related to something serious, like a heart attack or a blood clot in the lungs. You need to follow up with your caregiver for further evaluation. More lab tests or other studies such as X-rays, electrocardiography, stress testing, or cardiac imaging may be needed to find the cause of your pain.  Most of the time, nonspecific chest pain improves within 2 to 3 days with rest and mild pain medicine. For the next few days, avoid physical exertion or activities that bring on pain. Do not smoke. Avoid drinking alcohol. Call your caregiver for routine follow-up as advised.   SEEK IMMEDIATE MEDICAL CARE IF:  · You develop increased chest pain or pain that radiates to the arm, neck, jaw, back, or abdomen.   · You develop shortness of breath, increased coughing, or you start coughing up blood.   · You have severe back or abdominal pain, nausea, or vomiting.   · You develop severe weakness, fainting, fever, or chills.   Document Released: 12/18/2006 Document Revised: 03/11/2013 Document Reviewed: 06/06/2008  ExitCare® Patient Information ©2013 [employer].

## 2018-11-15 NOTE — PROGRESS NOTES
A/o,  respirations are even and unlabored on room air ,assessment completed, vital signs stable except for elevated /74, SB  on the monitor HR-58 with PVCs , updated communication board,  poc discussed and understood, verbalized understanding, pt aware NPO for stress test, all questions answered at this time , fall precautions in place, call button within reach, will continue to monitor

## 2018-11-15 NOTE — ED PROVIDER NOTES
ED Provider Note    Scribed for Royce Torrez M.D. by Serena Corral. 11/14/2018  8:39 PM    CHIEF COMPLAINT  Chief Complaint   Patient presents with   • Chest Pain       HPI  Ila Bangura is a 73 y.o. female who presents with chest pain that occurred this morning when she woke up. She reports going to urgent care earlier today and was sent to the ED for evaluation of her symptoms. Patient reports the pain initially started low and then moved higher into her chest. Patient describes the pain as a shooting pain that comes and goes for approximately 2 minutes at a time.  She reports that she took her blood pressure when the pain started and it was higher than her baseline so she took her blood pressure medication immediately. Patient states she had a stress test several years ago and a history of peptic ulcers. She reports having acid reflux, however, she states this pain is different. She states having similar pain in the past. Patient denies a history of heart attacks.    REVIEW OF SYSTEMS  Constitutional: No fevers, chills, or recent illness.  Cardiac: Positive for chest pain  See HPI for further details. All other systems are negative.       PAST MEDICAL HISTORY   has a past medical history of Asthma; Diabetes (HCC); and GERD (gastroesophageal reflux disease).    SOCIAL HISTORY  Social History     Social History Main Topics   • Smoking status: Never Smoker   • Smokeless tobacco: Never Used   • Alcohol use No   • Drug use: No   • Sexual activity: Not Currently       SURGICAL HISTORY   has a past surgical history that includes upper or lower gi procedure with anesthesia; colonoscopy; and dental surgery (2016).    CURRENT MEDICATIONS    Current Outpatient Prescriptions on File Prior to Encounter   Medication Sig Dispense Refill   • atorvastatin (LIPITOR) 10 MG Tab TAKE 1 TABLET DAILY 90 Tab 3   • carvedilol (COREG) 6.25 MG Tab TAKE 1  "TABLET TWICE A DAY WITH MEALS 180 Tab 3   • omeprazole (PRILOSEC) 20 MG delayed-release capsule TAKE 1 CAPSULE DAILY 90 Cap 3   • clindamycin (CLEOCIN) 150 MG Cap      • HYDROcodone-acetaminophen (NORCO) 5-325 MG Tab per tablet      • ondansetron (ZOFRAN ODT) 4 MG TABLET DISPERSIBLE      • tetanus-dipth-acell pertussis (ADACEL) 5-2-15.5 LF-MCG/0.5 Suspension 0.5 mL by Intramuscular route Once PRN for up to 1 dose. 0.5 mL 0   • cyanocobalamin (VITAMIN B-12) 500 MCG Tab Take 500 mcg by mouth every day.     • beclomethasone (QVAR) 80 MCG/ACT inhaler Inhale 1 Puff by mouth 2 times a day.     • Multiple Vitamins-Minerals (CENTRUM SILVER ADULT 50+ PO) Take  by mouth.     • levalbuterol (XOPENEX HFA) 45 MCG/ACT inhaler Inhale 1-2 Puffs by mouth every four hours as needed for Shortness of Breath.         ALLERGIES  Allergies   Allergen Reactions   • Augmentin Hives     Hard to breath   • Aspirin Nausea     Heart burn, even with omeprazole       PHYSICAL EXAM  VITAL SIGNS: BP (!) 173/63   Pulse 70   Temp 36.8 °C (98.2 °F) (Temporal)   Resp 18   Ht 1.499 m (4' 11\")   Wt 46.7 kg (103 lb)   SpO2 99%   BMI 20.80 kg/m²  @VIMAL[[id number]::@   Pulse ox interpretation: I interpret this pulse ox as normal.  Genl: Female sitting in chair comfortably, speaking clearly. Anxious appearing non toxic.  Head: NC/AT   ENT: Mucous membranes moist, posterior pharynx clear, uvula midline, nares patent bilaterally   Eyes: Normal sclera, pupils equal round reactive to light  Neck: Supple, FROM, no LAD appreciated   Pulmonary: Lungs are clear to auscultation bilaterally  Chest: No TTP  CV:  RRR, no murmur appreciated, pulses 2+ in both upper and lower extremities,  Abdomen: Mild pain to palpation of epigastrium, no Wright's sign. soft, ND; no rebound/guarding, no masses palpated, no HSM   : no CVA or suprapubic tenderness   Musculoskeletal: Pain free ROM of the neck. Moving upper and lower extremities and spontaneous in coordinated " fashion  Neuro: A&Ox4 (person, place, time, situation), speech fluent, gait steady, no focal deficits appreciated, Sensation is grossly intact in the distal upper and lower extremities  5/5 strength in  and dorsiflexion/plantar flexion of the ankles  Psych: Patient has an appropriate affect and behavior  Skin: No rash or lesions.  No pallor or jaundice.  No cyanosis.  Warm and dry.     DIAGNOSTIC STUDIES / PROCEDURES    EKG  EKG at 20:24 shows: rate of 64, rhythm sinus, axis normal, intervals normal, no acute ST segments, no ST segment elevations or depressions noted. No prior EKG for comparison.    EKG at 20:55 shows: rate of 52, rhythm sinus, axis normal, OK narrow, QRS normal, QTC no acute ischemia infarction. Unchanged from previous EKG.    LABS  Results for orders placed or performed during the hospital encounter of 11/14/18   CBC w/ Differential   Result Value Ref Range    WBC 7.1 4.8 - 10.8 K/uL    RBC 4.46 4.20 - 5.40 M/uL    Hemoglobin 13.8 12.0 - 16.0 g/dL    Hematocrit 42.4 37.0 - 47.0 %    MCV 95.1 81.4 - 97.8 fL    MCH 30.9 27.0 - 33.0 pg    MCHC 32.5 (L) 33.6 - 35.0 g/dL    RDW 39.2 35.9 - 50.0 fL    Platelet Count 237 164 - 446 K/uL    MPV 9.4 9.0 - 12.9 fL    Neutrophils-Polys 50.40 44.00 - 72.00 %    Lymphocytes 37.50 22.00 - 41.00 %    Monocytes 7.90 0.00 - 13.40 %    Eosinophils 3.10 0.00 - 6.90 %    Basophils 0.80 0.00 - 1.80 %    Immature Granulocytes 0.30 0.00 - 0.90 %    Nucleated RBC 0.00 /100 WBC    Neutrophils (Absolute) 3.59 2.00 - 7.15 K/uL    Lymphs (Absolute) 2.67 1.00 - 4.80 K/uL    Monos (Absolute) 0.56 0.00 - 0.85 K/uL    Eos (Absolute) 0.22 0.00 - 0.51 K/uL    Baso (Absolute) 0.06 0.00 - 0.12 K/uL    Immature Granulocytes (abs) 0.02 0.00 - 0.11 K/uL    NRBC (Absolute) 0.00 K/uL   Complete Metabolic Panel (CMP)   Result Value Ref Range    Sodium 137 135 - 145 mmol/L    Potassium 3.7 3.6 - 5.5 mmol/L    Chloride 107 96 - 112 mmol/L    Co2 24 20 - 33 mmol/L    Anion Gap 6.0 0.0 -  11.9    Glucose 106 (H) 65 - 99 mg/dL    Bun 16 8 - 22 mg/dL    Creatinine 0.67 0.50 - 1.40 mg/dL    Calcium 9.2 8.5 - 10.5 mg/dL    AST(SGOT) 17 12 - 45 U/L    ALT(SGPT) 14 2 - 50 U/L    Alkaline Phosphatase 63 30 - 99 U/L    Total Bilirubin 0.4 0.1 - 1.5 mg/dL    Albumin 4.1 3.2 - 4.9 g/dL    Total Protein 6.9 6.0 - 8.2 g/dL    Globulin 2.8 1.9 - 3.5 g/dL    A-G Ratio 1.5 g/dL   Btype Natriuretic Peptide   Result Value Ref Range    B Natriuretic Peptide 42 0 - 100 pg/mL   Troponin STAT   Result Value Ref Range    Troponin I 0.01 0.00 - 0.04 ng/mL   Troponin in two (2) hours   Result Value Ref Range    Troponin I 0.01 0.00 - 0.04 ng/mL   Magnesium   Result Value Ref Range    Magnesium 1.9 1.5 - 2.5 mg/dL   Lipase   Result Value Ref Range    Lipase 50 11 - 82 U/L   ESTIMATED GFR   Result Value Ref Range    GFR If African American >60 >60 mL/min/1.73 m 2    GFR If Non African American >60 >60 mL/min/1.73 m 2   EKG (NOW)   Result Value Ref Range    Report       [facility] Emergency Dept.    Test Date:  2018  Pt Name:    [facility]                Department: ER  MRN:        [id number]                      Room:       [facility]  Gender:     Female                       Technician: [id number]  :        1945                   Requested By:ER TRIAGE PROTOCOL  Order #:    [id number]                    Reading MD:    Measurements  Intervals                                Axis  Rate:       64                           P:          29  LA:         160                          QRS:        1  QRSD:       82                           T:          25  QT:         380  QTc:        392    Interpretive Statements  SINUS RHYTHM  LEFT VENTRICULAR HYPERTROPHY  No previous ECG available for comparison     EKG in two (2) hours   Result Value Ref Range    Report       [facility] Emergency Dept.    Test Date:  2018  Pt Name:    [facility]                Department: ER  MRN:        [id number]                       Room:       [facility]  Gender:     Female                       Technician: [id number]  :        1945                   Requested By:ALEXANDRA ANDERSON HONG  Order #:    [id number]                    Reading MD:    Measurements  Intervals                                Axis  Rate:       52                           P:          47  FL:         164                          QRS:        17  QRSD:       82                           T:          25  QT:         420  QTc:        391    Interpretive Statements  SINUS BRADYCARDIA  Compared to ECG 2018 20:20:55  Sinus rhythm no longer present  Left ventricular hypertrophy no longer present           RADIOLOGY  DX-CHEST-PORTABLE (1 VIEW)   Final Result      No acute cardiopulmonary abnormality.          COURSE & MEDICAL DECISION MAKING  Pertinent Labs & Imaging studies reviewed. (See chart for details)    Differential diagnoses include but not limited to: ACS, gastritis, esophagitis, GERD, aortic dissection, dysrhythmia.    8:39 PM - Patient seen and examined at bedside. Patient will be treated with  tablet, ASA suppository 300 mg, GO cocktail, pepcid injection 20 mg . Ordered chest x-ray, troponin, magnesium, lipase, CBC, CMP, BNP, EKG to evaluate her symptoms.     11:33 PM - I spoke with the patient and informed her of plan of admission and she was agreeable at this time.    11:38 PM - Paged hospitalist.     11:46 PM I discussed the patient's case and the above findings with Dr. Kendrick (hospitalist) who agreed to admit the patient.    Medical Decision Making:     Patient presents with a chief complaint of chest pain with my initial primary concern includes the differential listed above. Triage vital signs are reviewed and are stable.  Symptoms are not consistent with pleurisy or concerning features for PE.  Location of pain is more concerning for atypical chest pain/angina.  I obtained intravenous access. I reviewed the patient's history reported in the  chart and was reassured. A chest xray obtained and is unremarkable for pneumonia, pneumothorax, or widened mediastinum on my read. Laboratory analysis is obtained to evaluate for myocardial ischemia, evidence of infection, electrolyte abnormality, and abdominal sources for a cause for the patient's chest pain which are acutely normal. Troponins are 0.01 x2. EKGs are obtained to evaluate for ischemia and arrythmia and are without findings of acute ischemia or infarction. The patient is not currently needed acute pain control on reevaluation.  ?  Given the patient's presentation, history, risk factors, and studies in the emergency department I am concerned that this patient has significant cardiovascular disease. Therefore I gave the patient ASA. The patient is admitted for further cardiac risk stratification and observation    DISPOSITION:  Patient will be admitted to Dr. Kendrick in guarded condition.    FINAL IMPRESSION  Chest pain  Epigastric pain  Left arm pain     Serena BHANDARI (Evelia), am scribing for, and in the presence of, Royce Torrez M.D..    Electronically signed by: Serena Corral (Evelia), 11/14/2018    Royce BHANDARI M.D. personally performed the services described in this documentation, as scribed by Serena Corral in my presence, and it is both accurate and complete. C.    The note accurately reflects work and decisions made by me.  Royce Torrez  11/15/2018  4:58 AM

## 2018-11-15 NOTE — H&P
Hospital Medicine History & Physical Note    Date of Service  11/14/2018    Primary Care Physician  Wendy Galan M.D.    Consultants  None    Code Status  Full code     Chief Complaint  Chest pain    History of Presenting Illness  73 y.o. female with history of mild intermittent asthma, which is controlled, gastroesophageal reflux disease, essential hypertension controlled on medication regimen was in her usual state of health until the morning of admission.  She reports waking up with chest pain.  This is on the left side of her chest radiating to her left arm, this came and went, was pressure-like, without any exacerbating or alleviating factors.  When the pain recurred this afternoon, she presented to the emergency department for further evaluation.  She currently denies any shortness of breath, no fever or chills, no other complaints.    Review of Systems  Review of Systems   Constitutional: Negative.    HENT: Negative.    Eyes: Negative.    Respiratory: Negative.    Cardiovascular: Positive for chest pain.   Gastrointestinal: Negative.    Genitourinary: Negative.    Musculoskeletal: Negative.    Skin: Negative.    Neurological: Negative.    Endo/Heme/Allergies: Negative.    Psychiatric/Behavioral: Negative.        Past Medical History   has a past medical history of Asthma; Diabetes (HCC); and GERD (gastroesophageal reflux disease).    Surgical History   has a past surgical history that includes upper or lower gi procedure with anesthesia; colonoscopy; and dental surgery (2016).     Family History  family history includes Cancer in her father, maternal grandmother, and sister; Diabetes in her brother, brother, and sister; Lung Disease in her father; No Known Problems in her maternal grandfather; Other in her mother.     Social History   reports that she has never smoked. She has never used smokeless tobacco. She reports that she does not drink alcohol or use drugs.    Allergies  Allergies   Allergen  Reactions   • Augmentin Hives     Hard to breath   • Aspirin Nausea     Heart burn, even with omeprazole       Medications  Prior to Admission Medications   Prescriptions Last Dose Informant Patient Reported? Taking?   HYDROcodone-acetaminophen (NORCO) 5-325 MG Tab per tablet   Yes No   Multiple Vitamins-Minerals (CENTRUM SILVER ADULT 50+ PO) Taking  Yes No   Sig: Take  by mouth.   atorvastatin (LIPITOR) 10 MG Tab Taking  No No   Sig: TAKE 1 TABLET DAILY   beclomethasone (QVAR) 80 MCG/ACT inhaler 2018  Yes No   Sig: Inhale 1 Puff by mouth 2 times a day.   carvedilol (COREG) 6.25 MG Tab Taking  No No   Sig: TAKE 1 TABLET TWICE A DAY WITH MEALS   clindamycin (CLEOCIN) 150 MG Cap   Yes No   cyanocobalamin (VITAMIN B-12) 500 MCG Tab Taking  Yes No   Sig: Take 500 mcg by mouth every day.   levalbuterol (XOPENEX HFA) 45 MCG/ACT inhaler 2018  Yes No   Sig: Inhale 1-2 Puffs by mouth every four hours as needed for Shortness of Breath.   omeprazole (PRILOSEC) 20 MG delayed-release capsule PRN  No No   Sig: TAKE 1 CAPSULE DAILY   ondansetron (ZOFRAN ODT) 4 MG TABLET DISPERSIBLE   Yes No   tetanus-dipth-acell pertussis (ADACEL) 5-2-15.5 LF-MCG/0.5 Suspension   No No   Si.5 mL by Intramuscular route Once PRN for up to 1 dose.      Facility-Administered Medications: None       Physical Exam  Temp:  [36.8 °C (98.2 °F)] 36.8 °C (98.2 °F)  Pulse:  [60-70] 60  Resp:  [18] 18  BP: (173)/(63) 173/63    Physical Exam   Constitutional: She is oriented to person, place, and time. She appears well-developed and well-nourished. No distress.   HENT:   Head: Normocephalic and atraumatic.   Eyes: Pupils are equal, round, and reactive to light. Conjunctivae are normal.   Neck: Normal range of motion. Neck supple. No tracheal deviation present. No thyromegaly present.   Cardiovascular: Normal rate, regular rhythm and normal heart sounds.  Exam reveals no gallop and no friction rub.    No murmur heard.  Pulmonary/Chest: Effort normal  and breath sounds normal. No respiratory distress. She has no wheezes. She has no rales.   Abdominal: Soft. Bowel sounds are normal. She exhibits no distension. There is no tenderness. There is no rebound.   Musculoskeletal: Normal range of motion. She exhibits no edema.   Lymphadenopathy:     She has no cervical adenopathy.   Neurological: She is alert and oriented to person, place, and time. No cranial nerve deficit.   Skin: Skin is warm and dry. She is not diaphoretic.   Psychiatric: She has a normal mood and affect.   Nursing note and vitals reviewed.      Laboratory:  Recent Labs      11/14/18 2023   WBC  7.1   RBC  4.46   HEMOGLOBIN  13.8   HEMATOCRIT  42.4   MCV  95.1   MCH  30.9   MCHC  32.5*   RDW  39.2   PLATELETCT  237   MPV  9.4     Recent Labs      11/14/18 2023   SODIUM  137   POTASSIUM  3.7   CHLORIDE  107   CO2  24   GLUCOSE  106*   BUN  16   CREATININE  0.67   CALCIUM  9.2     Recent Labs      11/14/18 2023   ALTSGPT  14   ASTSGOT  17   ALKPHOSPHAT  63   TBILIRUBIN  0.4   LIPASE  50   GLUCOSE  106*         Recent Labs      11/14/18 2023   BNPBTYPENAT  42         Recent Labs      11/14/18 2023 11/14/18   2257   TROPONINI  0.01  0.01       Urinalysis:    No results found     Imaging:  DX-CHEST-PORTABLE (1 VIEW)   Final Result      No acute cardiopulmonary abnormality.      NM-CARDIAC STRESS TEST    (Results Pending)         Assessment/Plan:  I anticipate this patient is appropriate for observation status at this time.    * Chest pain   Assessment & Plan    Atypical, concern for underlying cardiac ischemia.  Plan for troponin trend, telemetry monitoring and, nuclear stress imaging study.      Mild persistent asthma without complication- (present on admission)   Assessment & Plan    Without current exacerbation.  Continue current medication regimen.  Monitor.      Dyslipidemia- (present on admission)   Assessment & Plan    Continue current statin therapy.  Monitor.      Essential  hypertension- (present on admission)   Assessment & Plan    Controlled with current medication regimen.  Monitor.          VTE prophylaxis: SCD, lovenox

## 2018-11-15 NOTE — PROGRESS NOTES
Received report from evening RN.  Pt is A/Ox4.  Respirations are even and unlabored on RA.  Monitors applied, VSS.  Plan of care reviewed, verbalized understanding.  Will continue to closely monitor. Call light within reach.

## 2018-11-15 NOTE — DISCHARGE SUMMARY
Discharge Summary    CHIEF COMPLAINT ON ADMISSION  Chief Complaint   Patient presents with   • Chest Pain       Reason for Admission  EMS     Admission Date  11/14/2018  Discharge Date  11/15/18    CODE STATUS  Full Code    HPI & HOSPITAL COURSE  This is a 73 y.o. Female with PMH controlled mild intermittent asthma, GERD, and HTN here with chest pain.  She described the chest pain as located on her left side arm, intermittent, pressure-like, without any exacerbating or alleviating factors.  She reports taking omeprazole for GERD, however she only takes it as needed.  Troponins were negative.  Twelve-lead EKG showed sinus rhythm/sinus bradycardia with no ST elevations or depressions.  Overnight telemetry revealed no episodes of arrhythmias, tachycardias or blocks.  Stress test was unremarkable.  This morning she has complete resolution of her symptoms.  She reports her last upper endoscopy was about 2 years ago and she was told she had acid reflux..  It is likely her symptoms are related to GERD as her symptoms are not consistent with renal or aortic pathology.  She is instructed to start taking her omeprazole daily, every day, versus as needed for a month.  If she gets relief of her symptoms she will follow-up with her PCP to discuss ongoing management.  She and her  at bedside are agreeable to the plan of care and are eager for discharge this morning.    Therefore, she is discharged in good and stable condition to home with close outpatient follow-up.      FOLLOW UP ITEMS POST DISCHARGE  -Start taking Omeprazole every day instead of as needed for one month. If improvement in symptoms, speak to your PCP about getting a prescription for taking it every day.  -FU with GI as needed  -FU with PCP  -Return to the nearest emergency department or call 911 for any worsening symptoms.    DISCHARGE DIAGNOSES  Principal Problem (Resolved):    Chest pain POA: Yes  Active Problems:    Essential hypertension POA: Yes     Dyslipidemia POA: Yes    Mild persistent asthma without complication POA: Yes      Overview: Patient follows with allergy specialist for asthma. She is taking       fluticasone inhaler and Xopenex. Symptom is well controlled with current       regimen. She has follow-up appointment with allergy specialist at the end       of this month.    Gastroesophageal reflux disease without esophagitis POA: Yes      FOLLOW UP  Future Appointments  Date Time Provider Department Center   11/20/2018 1:20 PM Wendy Galan M.D. [facility]   11/27/2018 5:35 PM [facility] [address]       MEDICATIONS ON DISCHARGE     Medication List      CHANGE how you take these medications      Instructions   omeprazole 20 MG delayed-release capsule  What changed:  · how to take this  · when to take this  Commonly known as:  PRILOSEC   Take 1 Cap by mouth every day.  Dose:  20 mg        CONTINUE taking these medications      Instructions   atorvastatin 10 MG Tabs  Commonly known as:  LIPITOR   TAKE 1 TABLET DAILY     carvedilol 6.25 MG Tabs  Commonly known as:  COREG   TAKE 1 TABLET TWICE A DAY WITH MEALS     CENTRUM SILVER ADULT 50+ PO   Take  by mouth.     cyanocobalamin 500 MCG Tabs  Commonly known as:  VITAMIN B-12   Take 500 mcg by mouth every day.  Dose:  500 mcg     HYDROcodone-acetaminophen 5-325 MG Tabs per tablet  Commonly known as:  NORCO      ondansetron 4 MG Tbdp  Commonly known as:  ZOFRAN ODT      QVAR 80 MCG/ACT inhaler  Generic drug:  beclomethasone   Inhale 1 Puff by mouth 2 times a day.  Dose:  1 Puff     XOPENEX HFA 45 MCG/ACT inhaler  Generic drug:  levalbuterol   Inhale 1-2 Puffs by mouth every four hours as needed for Shortness of Breath.  Dose:  1-2 Puff        STOP taking these medications    clindamycin 150 MG Caps  Commonly known as:  CLEOCIN     tetanus-dipth-acell pertussis 5-2-15.5 LF-MCG/0.5 Susp  Commonly known as:  ADACEL            Allergies  Allergies   Allergen Reactions   • Augmentin Hives     Hard to  breath   • Aspirin Nausea     Heart burn, even with omeprazole       DIET  Orders Placed This Encounter   Procedures   • Diet Order Regular     Standing Status:   Standing     Number of Occurrences:   1     Order Specific Question:   Diet:     Answer:   Regular [1]     Order Specific Question:   Miscellaneous modifications:     Answer:   No Decaf, No Caffeine(for test) [11]     Comments:   Protocol 1313 Patient to have no caffeine for 12 hours prior to exam (decaf, coffee, cola, tea, chocolate)       ACTIVITY  As tolerated.  Weight bearing as tolerated    CONSULTATIONS  NA    PROCEDURES  NA    LABORATORY  Lab Results   Component Value Date    SODIUM 137 11/14/2018    POTASSIUM 3.7 11/14/2018    CHLORIDE 107 11/14/2018    CO2 24 11/14/2018    GLUCOSE 106 (H) 11/14/2018    BUN 16 11/14/2018    CREATININE 0.67 11/14/2018        Lab Results   Component Value Date    WBC 7.1 11/14/2018    HEMOGLOBIN 13.8 11/14/2018    HEMATOCRIT 42.4 11/14/2018    PLATELETCT 237 11/14/2018        ALLYSON Lazaro.

## 2018-11-15 NOTE — ED TRIAGE NOTES
Pt sent by EMS from Urgent care for chest pain that radiates down left arm.  Pt has had chest pain off and on all day today.  Pt has no pain at this time.

## 2018-11-15 NOTE — PROGRESS NOTES
Subjective:      Ila Bangura is a 73 y.o. female who presents with Chest Pain (since this morning on her left side, elevated blood pressure- she has taken 2 jass asa)    PMH:  has a past medical history of Asthma; Diabetes (HCC); and GERD (gastroesophageal reflux disease).  MEDS:   Current Outpatient Prescriptions:   •  atorvastatin (LIPITOR) 10 MG Tab, TAKE 1 TABLET DAILY, Disp: 90 Tab, Rfl: 3  •  carvedilol (COREG) 6.25 MG Tab, TAKE 1 TABLET TWICE A DAY WITH MEALS, Disp: 180 Tab, Rfl: 3  •  cyanocobalamin (VITAMIN B-12) 500 MCG Tab, Take 500 mcg by mouth every day., Disp: , Rfl:   •  Multiple Vitamins-Minerals (CENTRUM SILVER ADULT 50+ PO), Take  by mouth., Disp: , Rfl:   •  omeprazole (PRILOSEC) 20 MG delayed-release capsule, TAKE 1 CAPSULE DAILY, Disp: 90 Cap, Rfl: 3  •  clindamycin (CLEOCIN) 150 MG Cap, , Disp: , Rfl:   •  HYDROcodone-acetaminophen (NORCO) 5-325 MG Tab per tablet, , Disp: , Rfl:   •  ondansetron (ZOFRAN ODT) 4 MG TABLET DISPERSIBLE, , Disp: , Rfl:   •  tetanus-dipth-acell pertussis (ADACEL) 5-2-15.5 LF-MCG/0.5 Suspension, 0.5 mL by Intramuscular route Once PRN for up to 1 dose., Disp: 0.5 mL, Rfl: 0  •  beclomethasone (QVAR) 80 MCG/ACT inhaler, Inhale 1 Puff by mouth 2 times a day., Disp: , Rfl:   •  levalbuterol (XOPENEX HFA) 45 MCG/ACT inhaler, Inhale 1-2 Puffs by mouth every four hours as needed for Shortness of Breath., Disp: , Rfl:   ALLERGIES:   Allergies   Allergen Reactions   • Augmentin Hives     Hard to breath   • Aspirin Nausea     Heart burn, even with omeprazole     SURGHX:   Past Surgical History:   Procedure Laterality Date   • DENTAL SURGERY  2016    bone graft   • COLONOSCOPY      negative   • UPPER OR LOWER GI PROCEDURE WITH ANESTHESIA      x 2 upper GI     SOCHX:  reports that she has never smoked. She has never used smokeless tobacco. She reports that she does not drink alcohol or use drugs.  FH: family history includes Cancer in her father, maternal grandmother,  and sister; Diabetes in her brother, brother, and sister; Lung Disease in her father; No Known Problems in her maternal grandfather; Other in her mother. Reviewed with patient/family. Not pertinent to this complaint.            Patient presents to clinic today with a complaint of left-sided chest pain that has been intermittent all day.  Patient states initially the chest pain was limited to the left side of her chest with some pressure that lasted approximately 10-15 minutes then resolved.  Patient states it continued to come and go throughout the day now with pressure extending to her left arm.  Patient notes that when she woke up this morning her blood pressure was quite elevated for her she states her normal blood pressure is the 120s over 70s,  but this morning it was then 170s over 80s .  Patient states she took her blood pressure throughout the day when she was experiencing chest pain and it continued to be high for her.  Patient did take all of her medications today as well as 81 mg aspirin x 2 tabs this afternoon.    Patient denies headache, neck pain, back pain, abdominal pain, nausea or vomiting, diarrhea, vision changes..      Chest Pain    This is a new problem. The current episode started today. The onset quality is sudden. The problem occurs intermittently. The problem has been waxing and waning. The pain is present in the lateral region and substernal region. The pain is moderate. The quality of the pain is described as heavy, pressure and squeezing. The pain radiates to the left shoulder and left arm. Pertinent negatives include no abdominal pain, back pain, cough, diaphoresis, dizziness, exertional chest pressure, fever, headaches, irregular heartbeat, near-syncope, palpitations, PND, shortness of breath or sputum production. She has tried NSAIDs for the symptoms. The treatment provided no relief. Risk factors include being elderly and post-menopausal.   Her past medical history is significant for  CAD, diabetes and hypertension.   Pertinent negatives for past medical history include no strokes.   Her family medical history is significant for CAD, diabetes and hypertension.       Review of Systems   Constitutional: Negative for diaphoresis and fever.   Respiratory: Negative for cough, sputum production, shortness of breath and wheezing.    Cardiovascular: Positive for chest pain. Negative for palpitations, leg swelling, PND and near-syncope.   Gastrointestinal: Negative for abdominal pain.   Musculoskeletal: Negative for back pain.   Neurological: Negative for dizziness and headaches.   All other systems reviewed and are negative.         Objective:     BP (!) 178/96   Pulse 65   Temp 36.7 °C (98.1 °F)   Resp 16   Ht 1.524 m (5')   Wt 50.3 kg (111 lb)   SpO2 98%   BMI 21.68 kg/m²      Physical Exam   Constitutional: She is oriented to person, place, and time. She appears well-developed and well-nourished. No distress.   HENT:   Head: Normocephalic and atraumatic.   Nose: Nose normal.   Mouth/Throat: Oropharynx is clear and moist.   Eyes: Pupils are equal, round, and reactive to light. Conjunctivae and EOM are normal.   Neck: Normal range of motion. Neck supple.   Cardiovascular: Normal rate, regular rhythm, normal heart sounds and intact distal pulses.    Pulmonary/Chest: Effort normal and breath sounds normal.   Abdominal: Soft.   Musculoskeletal: Normal range of motion.   Neurological: She is alert and oriented to person, place, and time. Gait normal.   Skin: Skin is warm and dry. Capillary refill takes less than 2 seconds.   Psychiatric: She has a normal mood and affect.   Nursing note and vitals reviewed.         EKG Interpretation   Interpreted by me   Rhythm: normal sinus   Rate: normal   Axis: normal   Ectopy: none   Conduction: normal   ST Segments: no acute change   T Waves: no acute change   Q Waves: none   Clinical Impression: no acute changes and normal EKG    No previous ekg for  comparison     Assessment/Plan:     1. Chest pain radiating to arm  EKG - Clinic Performed   2. Elevated blood pressure reading in office with diagnosis of hypertension  EKG - Clinic Performed     PT refused additional ASA in clinic    PT requires evaluation and treatment at a facility that can provide a higher level of care due to acuity of illness/complaint.     I spoke with Dr. Pawel VASQUEZ who is aware of pt CC, HPI, VS and transport mode (EMS).

## 2018-11-15 NOTE — PROGRESS NOTES
Report received from ER RN, transported pt from ER to T204, all belongings and charts with patient, hooked in the monitor, SR on the monitor

## 2018-11-15 NOTE — PROGRESS NOTES
I have personally seen and examined / evaluated the patient, discussed the patient's evaluation & management plan with PETE Bruce and I have reviewed the note below.     I agree with the findings, history, examination and assessment / plan as listed below with changes/addendum as listed below by me in my addendum / attestation separetely.     Here with chest pain.   EKG on presentation negative for any concerns of ischemic / infarction   Negative troponin's  No events on telemetry   CXRAY without any abnormalities   Stress test negative for ischemia   Appears 2/2 GERD   Completely asymptomatic and back to baseline at this time   Eager to discharge home   Advised omeprazole 20 mg daily, outpatient PCP f/u and consideration towards outpatient GI evaluation and EGD  Presentation not consistent with VTE disease (low wells) or aortic pathologies (no evaluation for this is indicated)  Can dc home with close f/u with PCP       Salome Pena M.D.  11/15/18  10:04 AM

## 2018-11-16 ENCOUNTER — PATIENT OUTREACH (OUTPATIENT)
Dept: HEALTH INFORMATION MANAGEMENT | Facility: OTHER | Age: 73
End: 2018-11-16

## 2018-11-20 ENCOUNTER — OFFICE VISIT (OUTPATIENT)
Dept: MEDICAL GROUP | Age: 73
End: 2018-11-20
Payer: MEDICARE

## 2018-11-20 VITALS
BODY MASS INDEX: 21.57 KG/M2 | SYSTOLIC BLOOD PRESSURE: 110 MMHG | HEIGHT: 59 IN | TEMPERATURE: 98.3 F | WEIGHT: 107 LBS | HEART RATE: 60 BPM | OXYGEN SATURATION: 97 % | DIASTOLIC BLOOD PRESSURE: 72 MMHG

## 2018-11-20 DIAGNOSIS — Z23 NEED FOR VACCINATION: ICD-10-CM

## 2018-11-20 DIAGNOSIS — K21.9 GASTROESOPHAGEAL REFLUX DISEASE WITHOUT ESOPHAGITIS: ICD-10-CM

## 2018-11-20 DIAGNOSIS — E78.5 DYSLIPIDEMIA: ICD-10-CM

## 2018-11-20 DIAGNOSIS — J45.30 MILD PERSISTENT ASTHMA WITHOUT COMPLICATION: ICD-10-CM

## 2018-11-20 DIAGNOSIS — R73.01 IFG (IMPAIRED FASTING GLUCOSE): ICD-10-CM

## 2018-11-20 DIAGNOSIS — Z09 HOSPITAL DISCHARGE FOLLOW-UP: ICD-10-CM

## 2018-11-20 DIAGNOSIS — I10 ESSENTIAL HYPERTENSION: ICD-10-CM

## 2018-11-20 PROCEDURE — 90662 IIV NO PRSV INCREASED AG IM: CPT | Performed by: INTERNAL MEDICINE

## 2018-11-20 PROCEDURE — G0008 ADMIN INFLUENZA VIRUS VAC: HCPCS | Performed by: INTERNAL MEDICINE

## 2018-11-20 PROCEDURE — 99215 OFFICE O/P EST HI 40 MIN: CPT | Mod: 25 | Performed by: INTERNAL MEDICINE

## 2018-11-20 NOTE — PATIENT INSTRUCTIONS
1800 Calorie Diet for Diabetes Meal Planning  The 1800 calorie diet is designed for eating up to 1800 calories each day. Following this diet and making healthy meal choices can help improve overall health. This diet controls blood sugar (glucose) levels and can also help lower blood pressure and cholesterol.  SERVING SIZES  Measuring foods and serving sizes helps to make sure you are getting the right amount of food. The list below tells how big or small some common serving sizes are:  · 1 oz.........4 stacked dice.   · 3 oz.........Deck of cards.   · 1 tsp........Tip of little finger.   · 1 tbs........Thumb.   · 2 tbs........Golf ball.   · ½ cup.......Half of a fist.   · 1 cup........A fist.   GUIDELINES FOR CHOOSING FOODS  The goal of this diet is to eat a variety of foods and limit calories to 1800 each day. This can be done by choosing foods that are low in calories and fat. The diet also suggests eating small amounts of food frequently. Doing this helps control your blood glucose levels so they do not get too high or too low. Each meal or snack may include a protein food source to help you feel more satisfied and to stabilize your blood glucose. Try to eat about the same amount of food around the same time each day. This includes weekend days, travel days, and days off work. Space your meals about 4 to 5 hours apart and add a snack between them if you wish.   For example, a daily food plan could include breakfast, a morning snack, lunch, dinner, and an evening snack. Healthy meals and snacks include whole grains, vegetables, fruits, lean meats, poultry, fish, and dairy products. As you plan your meals, select a variety of foods. Choose from the bread and starch, vegetable, fruit, dairy, and meat/protein groups. Examples of foods from each group and their suggested serving sizes are listed below. Use measuring cups and spoons to become familiar with what a healthy portion looks like.  Bread and Starch  Each  serving equals 15 grams of carbohydrates.  · 1 slice bread.   · ¼ bagel.   · ¾ cup cold cereal (unsweetened).   · ½ cup hot cereal or mashed potatoes.   · 1 small potato (size of a computer mouse).   ·  cup cooked pasta or rice.   · ½ English muffin.   · 1 cup broth-based soup.   · 3 cups of popcorn.   · 4 to 6 whole-wheat crackers.   · ½ cup cooked beans, peas, or corn.   Vegetable  Each serving equals 5 grams of carbohydrates.  · ½ cup cooked vegetables.   · 1 cup raw vegetables.   · ½ cup tomato or vegetable juice.   Fruit  Each serving equals 15 grams of carbohydrates.  · 1 small apple or orange.   · 1¼ cup watermelon or strawberries.   · ½ cup applesauce (no sugar added).   · 2 tbs raisins.   · ½ banana.   · ½ cup canned fruit, packed in water, its own juice, or sweetened with a sugar substitute.   · ½ cup unsweetened fruit juice.   Dairy  Each serving equals 12 to 15 grams of carbohydrates.  · 1 cup fat-free milk.   · 6 oz artificially sweetened yogurt or plain yogurt.   · 1 cup low-fat buttermilk.   · 1 cup soy milk.   · 1 cup almond milk.   Meat/Protein  · 1 large egg.   · 2 to 3 oz meat, poultry, or fish.   · ¼ cup low-fat cottage cheese.   · 1 tbs peanut butter.   · 1 oz low-fat cheese.   · ¼ cup tuna in water.   · ½ cup tofu.   Fat  · 1 tsp oil.   · 1 tsp trans-fat-free margarine.   · 1 tsp butter.   · 1 tsp mayonnaise.   · 2 tbs avocado.   · 1 tbs salad dressing.   · 1 tbs cream cheese.   · 2 tbs sour cream.   SAMPLE 1800 CALORIE DIET PLAN  Breakfast  · ¾ cup unsweetened cereal (1 carb serving).   · 1 cup fat-free milk (1 carb serving).   · 1 slice whole-wheat toast (1 carb serving).   · ½ small banana (1 carb serving).   · 1 scrambled egg.   · 1 tsp trans-fat-free margarine.   Lunch  · Tuna sandwich.   · 2 slices whole-wheat bread (2 carb servings).   · ½ cup canned tuna in water, drained.   · 1 tbs reduced fat mayonnaise.   · 1 stalk celery, chopped.   · 2 slices tomato.   · 1 lettuce leaf.   · 1 cup  carrot sticks.   · 24 to 30 seedless grapes (2 carb servings).   · 6 oz light yogurt (1 carb serving).   Afternoon Snack  · 3 roxy cracker squares (1 carb serving).   · Fat-free milk, 1 cup (1 carb serving).   · 1 tbs peanut butter.   Dinner  · 3 oz salmon, broiled with 1 tsp oil.   · 1 cup mashed potatoes (2 carb servings) with 1 tsp trans-fat-free margarine.   · 1 cup fresh or frozen green beans.   · 1 cup steamed asparagus.   · 1 cup fat-free milk (1 carb serving).   Evening Snack  · 3 cups air-popped popcorn (1 carb serving).   · 2 tbs parmesan cheese sprinkled on top.   MEAL PLAN  Use this worksheet to help you make a daily meal plan based on the 1800 calorie diet suggestions. If you are using this plan to help you control your blood glucose, you may interchange carbohydrate-containing foods (dairy, starches, and fruits). Select a variety of fresh foods of varying colors and flavors. The total amount of carbohydrate in your meals or snacks is more important than making sure you include all of the food groups every time you eat. Choose from the following foods to build your day's meals:  · 8 Starches.   · 4 Vegetables.   · 3 Fruits.   · 2 Dairy.   · 6 to 7 oz Meat/Protein.   · Up to 4 Fats.   Your dietician can use this worksheet to help you decide how many servings and which types of foods are right for you.  BREAKFAST  Food Group and Servings / Food Choice  Starch ________________________________________________________  Dairy _________________________________________________________  Fruit _________________________________________________________  Meat/Protein __________________________________________________  Fat ___________________________________________________________  LUNCH  Food Group and Servings / Food Choice  Starch ________________________________________________________  Meat/Protein __________________________________________________  Vegetable  _____________________________________________________  Fruit _________________________________________________________  Dairy _________________________________________________________  Fat ___________________________________________________________  AFTERNOON SNACK  Food Group and Servings / Food Choice  Starch ________________________________________________________  Meat/Protein __________________________________________________  Fruit __________________________________________________________  Dairy _________________________________________________________  DINNER  Food Group and Servings / Food Choice  Starch _________________________________________________________  Meat/Protein ___________________________________________________  Dairy __________________________________________________________  Vegetable ______________________________________________________  Fruit ___________________________________________________________  Fat ____________________________________________________________  EVENING SNACK  Food Group and Servings / Food Choice  Fruit __________________________________________________________  Meat/Protein ___________________________________________________  Dairy __________________________________________________________  Starch _________________________________________________________  DAILY TOTALS  Starch ____________________________  Vegetable _________________________  Fruit _____________________________  Dairy _____________________________  Meat/Protein______________________  Fat _______________________________  Document Released: 07/10/2006 Document Revised: 03/11/2013 Document Reviewed: 11/02/2012  [employer]® Patient Information ©2013 [facility].

## 2018-11-21 NOTE — ASSESSMENT & PLAN NOTE
Patient is taking atorvastatin 10 mg every evening.  She denies side effects from taking it.  Her cholesterol is stable and well controlled.  I discussed the blood test result with her and her .    Results for RENZO TERRELL NIKKIE (MRN [id number]) as of 11/20/2018 18:59   Ref. Range 11/8/2018 06:48   Cholesterol,Tot Latest Ref Range: 100 - 199 mg/dL 166   Triglycerides Latest Ref Range: 0 - 149 mg/dL 117   HDL Latest Ref Range: >=40 mg/dL 72   LDL Latest Ref Range: <100 mg/dL 71

## 2018-11-21 NOTE — ASSESSMENT & PLAN NOTE
Patient still has elevated fasting blood sugar and A1c of 6.4 on 11/8/18.  She stated that she did not eat any simple carbohydrates that she likes to eat wheat bread and she also likes to eat fruits.  She has not exercising regularly.  Patient concerned of her sugar level.  I discussed to take metformin to treat prediabetes and improve her blood sugar.  She declined to take metformin as she also concerned of potential side effects.  She declined to take medication.  She also has a lot of questions on her diet.  She stated that her  eats more than what she does and eats a lot of processed sugar and ice cream.  However her  did not have high sugar.  She states that she drinks diet soda frequently throughout the week.

## 2018-11-21 NOTE — ASSESSMENT & PLAN NOTE
Patient is taking Qvar inhaler 1 puff twice a day and Xopenex 1-2 puffs every 4-6 hours as needed.  She states that her asthma is stable and well controlled.  She does not have any recurrent asthma attack.  She denies side effects from taking inhalers.

## 2018-11-21 NOTE — ASSESSMENT & PLAN NOTE
Patient stated that she stopped taking Prilosec daily and she only took as needed until November 11, 2018.  She stated that she had severe chest discomfort and went to ER.  She has negative troponin 3 times, negative EKG for acute coronary artery disease and a negative stress test.  She was discharged from ER and was advised to take Prilosec 20 mg daily for 4 weeks.  Patient stated that she ate wheat Baker before symptom occurred.  Patient stated that her chest pain resolved after taking Prilosec daily.

## 2018-11-21 NOTE — PROGRESS NOTES
Subjective:   Ila Bangura is a 73 y.o. female here today for evaluation and management of:      Mild persistent asthma without complication  Patient is taking Qvar inhaler 1 puff twice a day and Xopenex 1-2 puffs every 4-6 hours as needed.  She states that her asthma is stable and well controlled.  She does not have any recurrent asthma attack.  She denies side effects from taking inhalers.    IFG (impaired fasting glucose)  Patient still has elevated fasting blood sugar and A1c of 6.4 on 11/8/18.  She stated that she did not eat any simple carbohydrates that she likes to eat wheat bread and she also likes to eat fruits.  She has not exercising regularly.  Patient concerned of her sugar level.  I discussed to take metformin to treat prediabetes and improve her blood sugar.  She declined to take metformin as she also concerned of potential side effects.  She declined to take medication.  She also has a lot of questions on her diet.  She stated that her  eats more than what she does and eats a lot of processed sugar and ice cream.  However her  did not have high sugar.  She states that she drinks diet soda frequently throughout the week.    Gastroesophageal reflux disease without esophagitis  Patient stated that she stopped taking Prilosec daily and she only took as needed until November 11, 2018.  She stated that she had severe chest discomfort and went to ER.  She has negative troponin 3 times, negative EKG for acute coronary artery disease and a negative stress test.  She was discharged from ER and was advised to take Prilosec 20 mg daily for 4 weeks.  Patient stated that she ate wheat Baker before symptom occurred.  Patient stated that her chest pain resolved after taking Prilosec daily.    Essential hypertension  Patient is taking carvedilol 6.25 mg twice daily.  Her blood pressure is stable and well controlled.  She denies side effects from taking carvedilol.  Her kidney function is stable  "and well controlled.    Dyslipidemia  Patient is taking atorvastatin 10 mg every evening.  She denies side effects from taking it.  Her cholesterol is stable and well controlled.  I discussed the blood test result with her and her .    Results for TERRELL MULLER (MRN [id number]) as of 11/20/2018 18:59   Ref. Range 11/8/2018 06:48   Cholesterol,Tot Latest Ref Range: 100 - 199 mg/dL 166   Triglycerides Latest Ref Range: 0 - 149 mg/dL 117   HDL Latest Ref Range: >=40 mg/dL 72   LDL Latest Ref Range: <100 mg/dL 71          Current medicines (including changes today)  Current Outpatient Prescriptions   Medication Sig Dispense Refill   • omeprazole (PRILOSEC) 20 MG delayed-release capsule Take 1 Cap by mouth every day. 30 Cap 2   • atorvastatin (LIPITOR) 10 MG Tab TAKE 1 TABLET DAILY 90 Tab 3   • carvedilol (COREG) 6.25 MG Tab TAKE 1 TABLET TWICE A DAY WITH MEALS 180 Tab 3   • cyanocobalamin (VITAMIN B-12) 500 MCG Tab Take 500 mcg by mouth every day.     • beclomethasone (QVAR) 80 MCG/ACT inhaler Inhale 1 Puff by mouth 2 times a day.     • Multiple Vitamins-Minerals (CENTRUM SILVER ADULT 50+ PO) Take  by mouth.     • levalbuterol (XOPENEX HFA) 45 MCG/ACT inhaler Inhale 1-2 Puffs by mouth every four hours as needed for Shortness of Breath.       No current facility-administered medications for this visit.      She  has a past medical history of Asthma; Diabetes (HCC); and GERD (gastroesophageal reflux disease).    ROS   No chest pain, no shortness of breath, no abdominal pain       Objective:     Blood pressure 110/72, pulse 60, temperature 36.8 °C (98.3 °F), temperature source Temporal, height 1.504 m (4' 11.2\"), weight 48.5 kg (107 lb), SpO2 97 %, not currently breastfeeding. Body mass index is 21.47 kg/m².   Physical Exam:  General: Alert, oriented and no acute distress.  Eye contact is good, speech goal directed, affect calm  HEENT: conjunctiva non-injected, sclera non-icteric.  Oral mucous membranes pink " and moist with no lesions.  Pinna normal.   Lungs: Normal respiratory effort, clear to auscultation bilaterally with good excursion.  CV: regular rate and rhythm. No murmurs.   Abdomen: soft, non distended, nontender, Bowel sound normal.  Ext: no edema, color normal, vascularity normal, temperature normal        Assessment and Plan:   The following treatment plan was discussed     1. Hospital discharge follow-up  - Symptoms resolved.  Patient has negative workup for acute coronary syndrome in ER.  She does not have recurrent chest pain.  She is taking omeprazole 20 mg daily.    2. IFG (impaired fasting glucose)  - Not well controlled.  I have a long discussion with patient for treatment option and diet.  Patient declined to take medication.  She still wanted to control her diabetes with diet and exercise.  I discussed diabetes diet plan with patient and I printed out diabetes diet plan for her.  - COMP METABOLIC PANEL; Future  - HEMOGLOBIN A1C; Future    3. Essential hypertension  - Well-controlled. Continue current regimens, carvedilol 6.25 mg twice daily. Recheck lab 1-2 weeks before next follow up visit.  - Reviewed the risks and benefits as well as potential side effects of medications with patient.  - Discussed to eat low-sodium diet and encouraged to do regular physical exercise.  - Recommend to monitor blood pressure and heart rate at home.  - CBC WITH DIFFERENTIAL; Future  - COMP METABOLIC PANEL; Future    4. Mild persistent asthma without complication  - Well-controlled. Continue current regimens, Qvar 1 puff twice a day and Xopenex 2 puffs every 4-6 hours as needed.  Reviewed potential side effects of Qvar inhaler and Xopenex inhaler with patient.  Recheck lab 1-2 weeks before next follow up visit.    5. Gastroesophageal reflux disease without esophagitis  - Well-controlled. Continue current regimens, Prilosec 20 mg every morning before breakfast.  I discussed with patient to avoid acidic food, excessive  caffeine, spicy food, alcohol or NSAIDs.  Patient is advised to avoid eating late in the evening time and advised to stay upright for 4 hours after eating.  Patient is advised to elevate the head of the bed at night.  Recheck lab 1-2 weeks before next follow up visit.    6. Dyslipidemia  - Well-controlled. Continue current regimens, atorvastatin 10 mg every evening. Recheck lab 1-2 weeks before next follow up visit.  - Reviewed the risks and benefits of treatment and potential side effects of medication.  - Advised to eat low fat, low carbohydrate and high fiber diet as well as do cardio physical exercise regularly.  - COMP METABOLIC PANEL; Future  - Lipid Profile; Future    7. Need for vaccination  - Influenza vaccine was given today after reviewing risks and benefits as well as side effects of vaccine.  - INFLUENZA VACCINE, HIGH DOSE (65+ ONLY)    Face-to-face time spent 40 minutes with patient and more than half of that time spent for counseling for diabetes diet, discussion on treatment option, reviewing the risks and benefits of medication, reviewed ER note, blood test results, nuclear stress test and cooperating of care for medical problems listed above.       Followup: Return in about 6 months (around 5/20/2019), or if symptoms worsen or fail to improve, for Hypertension, Hyperlipidemia, Prediabetes, Asthma, Lab review.      Please note that this dictation was created using voice recognition software. I have made every reasonable attempt to correct obvious errors, but I expect that there may have unintended errors in text, spelling, punctuation, or grammar that I did not discover.

## 2018-11-21 NOTE — ASSESSMENT & PLAN NOTE
Patient is taking carvedilol 6.25 mg twice daily.  Her blood pressure is stable and well controlled.  She denies side effects from taking carvedilol.  Her kidney function is stable and well controlled.

## 2018-11-27 ENCOUNTER — HOSPITAL ENCOUNTER (OUTPATIENT)
Dept: RADIOLOGY | Facility: MEDICAL CENTER | Age: 73
End: 2018-11-27
Attending: INTERNAL MEDICINE
Payer: MEDICARE

## 2018-11-27 DIAGNOSIS — Z12.31 SCREENING MAMMOGRAM, ENCOUNTER FOR: ICD-10-CM

## 2018-11-27 PROCEDURE — 77067 SCR MAMMO BI INCL CAD: CPT

## 2018-12-18 LAB — EKG IMPRESSION: NORMAL

## 2019-01-14 ENCOUNTER — OFFICE VISIT (OUTPATIENT)
Dept: URGENT CARE | Facility: PHYSICIAN GROUP | Age: 74
End: 2019-01-14
Payer: MEDICARE

## 2019-01-14 VITALS
RESPIRATION RATE: 14 BRPM | OXYGEN SATURATION: 95 % | TEMPERATURE: 98 F | HEART RATE: 68 BPM | HEIGHT: 59 IN | SYSTOLIC BLOOD PRESSURE: 120 MMHG | WEIGHT: 108 LBS | DIASTOLIC BLOOD PRESSURE: 74 MMHG | BODY MASS INDEX: 21.77 KG/M2

## 2019-01-14 DIAGNOSIS — L30.8 OTHER ECZEMA: ICD-10-CM

## 2019-01-14 PROCEDURE — 99214 OFFICE O/P EST MOD 30 MIN: CPT | Performed by: NURSE PRACTITIONER

## 2019-01-14 RX ORDER — TRIAMCINOLONE ACETONIDE 5 MG/G
1 CREAM TOPICAL 2 TIMES DAILY
Qty: 1 TUBE | Refills: 0 | Status: SHIPPED | OUTPATIENT
Start: 2019-01-14 | End: 2019-01-21

## 2019-01-14 ASSESSMENT — ENCOUNTER SYMPTOMS
SORE THROAT: 0
WHEEZING: 0
SINUS PAIN: 0
NAUSEA: 0
DOUBLE VISION: 0
SHORTNESS OF BREATH: 0
PALPITATIONS: 0
VOMITING: 0
CHILLS: 0
COUGH: 0
FEVER: 0
BLURRED VISION: 0

## 2019-01-15 NOTE — PROGRESS NOTES
Subjective:   Ila Bangura is a 73 y.o. female who presents for Rash (x1 week red bumps behind knees)        HPI   Patient with new onset rash behind both knees that started a week ago. Patient states symptoms are moderate, unchanged and worsened after taking a shower. She states rash is itchy. Denies fever or chills. Denies alleviating or aggravating factors.        Review of Systems   Constitutional: Negative for chills, fever and malaise/fatigue.   HENT: Negative for congestion, ear pain, sinus pain, sore throat and tinnitus.    Eyes: Negative for blurred vision and double vision.   Respiratory: Negative for cough, shortness of breath and wheezing.    Cardiovascular: Negative for chest pain and palpitations.   Gastrointestinal: Negative for nausea and vomiting.   Skin: Positive for itching and rash.        Behind both knees     PMH:  has a past medical history of Asthma; Diabetes (HCC); and GERD (gastroesophageal reflux disease).  MEDS:   Current Outpatient Prescriptions:   •  triamcinolone acetonide (KENALOG) 0.5 % Cream, Apply 1 Each to affected area(s) 2 times a day for 7 days. Apply to affected area twice daily; generic ok, Disp: 1 Tube, Rfl: 0  •  omeprazole (PRILOSEC) 20 MG delayed-release capsule, Take 1 Cap by mouth every day., Disp: 30 Cap, Rfl: 2  •  atorvastatin (LIPITOR) 10 MG Tab, TAKE 1 TABLET DAILY, Disp: 90 Tab, Rfl: 3  •  carvedilol (COREG) 6.25 MG Tab, TAKE 1 TABLET TWICE A DAY WITH MEALS, Disp: 180 Tab, Rfl: 3  •  cyanocobalamin (VITAMIN B-12) 500 MCG Tab, Take 500 mcg by mouth every day., Disp: , Rfl:   •  beclomethasone (QVAR) 80 MCG/ACT inhaler, Inhale 1 Puff by mouth 2 times a day., Disp: , Rfl:   •  Multiple Vitamins-Minerals (CENTRUM SILVER ADULT 50+ PO), Take  by mouth., Disp: , Rfl:   •  levalbuterol (XOPENEX HFA) 45 MCG/ACT inhaler, Inhale 1-2 Puffs by mouth every four hours as needed for Shortness of Breath., Disp: , Rfl:   ALLERGIES:   Allergies   Allergen Reactions   •  "Augmentin Hives     Hard to breath   • Aspirin Nausea     Heart burn, even with omeprazole     SURGHX:   Past Surgical History:   Procedure Laterality Date   • DENTAL SURGERY  2016    bone graft   • COLONOSCOPY      negative   • UPPER OR LOWER GI PROCEDURE WITH ANESTHESIA      x 2 upper GI     SOCHX:  reports that she has never smoked. She has never used smokeless tobacco. She reports that she does not drink alcohol or use drugs.  FH: Family history was reviewed, no pertinent findings to report     Objective:   /74 (BP Location: Left arm, Patient Position: Sitting, BP Cuff Size: Adult)   Pulse 68   Temp 36.7 °C (98 °F) (Oral)   Resp 14   Ht 1.504 m (4' 11.2\")   Wt 49 kg (108 lb)   LMP  (LMP Unknown)   SpO2 95%   BMI 21.67 kg/m²   Physical Exam   Constitutional: She appears well-developed and well-nourished. No distress.   HENT:   Head: Normocephalic.   Cardiovascular: Normal rate, regular rhythm and normal heart sounds.    Pulmonary/Chest: Effort normal and breath sounds normal. No respiratory distress. She has no decreased breath sounds. She has no wheezes.   Neurological: She is alert.   Skin: Skin is warm. Rash noted. Rash is not pustular, not vesicular and not urticarial. She is not diaphoretic.        Psychiatric: She has a normal mood and affect. Her behavior is normal. Judgment and thought content normal.   Vitals reviewed.        Assessment/Plan:   Assessment    1. Other eczema  - triamcinolone acetonide (KENALOG) 0.5 % Cream; Apply 1 Each to affected area(s) 2 times a day for 7 days. Apply to affected area twice daily; generic ok  Dispense: 1 Tube; Refill: 0    Apply alcohol free lotion to area twice daily.     Patient had prior appointment with Derm for 2/11, she is to keep appointment and follow up with them regarding eczema.    Differential diagnosis, natural history, supportive care, and indications for immediate follow-up discussed.       "

## 2019-05-06 DIAGNOSIS — I10 ESSENTIAL HYPERTENSION: ICD-10-CM

## 2019-05-09 RX ORDER — CARVEDILOL 6.25 MG/1
TABLET ORAL
Qty: 180 TAB | Refills: 3 | Status: SHIPPED | OUTPATIENT
Start: 2019-05-09 | End: 2019-12-11 | Stop reason: SDUPTHER

## 2019-05-18 ENCOUNTER — HOSPITAL ENCOUNTER (OUTPATIENT)
Dept: LAB | Facility: MEDICAL CENTER | Age: 74
End: 2019-05-18
Attending: INTERNAL MEDICINE
Payer: MEDICARE

## 2019-05-18 DIAGNOSIS — I10 ESSENTIAL HYPERTENSION: ICD-10-CM

## 2019-05-18 DIAGNOSIS — E78.5 DYSLIPIDEMIA: ICD-10-CM

## 2019-05-18 DIAGNOSIS — R73.01 IFG (IMPAIRED FASTING GLUCOSE): ICD-10-CM

## 2019-05-18 LAB
ALBUMIN SERPL BCP-MCNC: 4.2 G/DL (ref 3.2–4.9)
ALBUMIN/GLOB SERPL: 1.3 G/DL
ALP SERPL-CCNC: 79 U/L (ref 30–99)
ALT SERPL-CCNC: 15 U/L (ref 2–50)
ANION GAP SERPL CALC-SCNC: 8 MMOL/L (ref 0–11.9)
AST SERPL-CCNC: 20 U/L (ref 12–45)
BASOPHILS # BLD AUTO: 1.5 % (ref 0–1.8)
BASOPHILS # BLD: 0.08 K/UL (ref 0–0.12)
BILIRUB SERPL-MCNC: 0.3 MG/DL (ref 0.1–1.5)
BUN SERPL-MCNC: 16 MG/DL (ref 8–22)
CALCIUM SERPL-MCNC: 9.5 MG/DL (ref 8.5–10.5)
CHLORIDE SERPL-SCNC: 104 MMOL/L (ref 96–112)
CHOLEST SERPL-MCNC: 149 MG/DL (ref 100–199)
CO2 SERPL-SCNC: 27 MMOL/L (ref 20–33)
CREAT SERPL-MCNC: 0.63 MG/DL (ref 0.5–1.4)
EOSINOPHIL # BLD AUTO: 0.36 K/UL (ref 0–0.51)
EOSINOPHIL NFR BLD: 6.6 % (ref 0–6.9)
ERYTHROCYTE [DISTWIDTH] IN BLOOD BY AUTOMATED COUNT: 40.1 FL (ref 35.9–50)
EST. AVERAGE GLUCOSE BLD GHB EST-MCNC: 134 MG/DL
FASTING STATUS PATIENT QL REPORTED: NORMAL
GLOBULIN SER CALC-MCNC: 3.2 G/DL (ref 1.9–3.5)
GLUCOSE SERPL-MCNC: 127 MG/DL (ref 65–99)
HBA1C MFR BLD: 6.3 % (ref 0–5.6)
HCT VFR BLD AUTO: 44.5 % (ref 37–47)
HDLC SERPL-MCNC: 56 MG/DL
HGB BLD-MCNC: 14.8 G/DL (ref 12–16)
IMM GRANULOCYTES # BLD AUTO: 0.01 K/UL (ref 0–0.11)
IMM GRANULOCYTES NFR BLD AUTO: 0.2 % (ref 0–0.9)
LDLC SERPL CALC-MCNC: 68 MG/DL
LYMPHOCYTES # BLD AUTO: 1.92 K/UL (ref 1–4.8)
LYMPHOCYTES NFR BLD: 35.4 % (ref 22–41)
MCH RBC QN AUTO: 30.8 PG (ref 27–33)
MCHC RBC AUTO-ENTMCNC: 33.3 G/DL (ref 33.6–35)
MCV RBC AUTO: 92.7 FL (ref 81.4–97.8)
MONOCYTES # BLD AUTO: 0.41 K/UL (ref 0–0.85)
MONOCYTES NFR BLD AUTO: 7.6 % (ref 0–13.4)
NEUTROPHILS # BLD AUTO: 2.65 K/UL (ref 2–7.15)
NEUTROPHILS NFR BLD: 48.7 % (ref 44–72)
NRBC # BLD AUTO: 0 K/UL
NRBC BLD-RTO: 0 /100 WBC
PLATELET # BLD AUTO: 241 K/UL (ref 164–446)
PMV BLD AUTO: 9.7 FL (ref 9–12.9)
POTASSIUM SERPL-SCNC: 4.2 MMOL/L (ref 3.6–5.5)
PROT SERPL-MCNC: 7.4 G/DL (ref 6–8.2)
RBC # BLD AUTO: 4.8 M/UL (ref 4.2–5.4)
SODIUM SERPL-SCNC: 139 MMOL/L (ref 135–145)
TRIGL SERPL-MCNC: 125 MG/DL (ref 0–149)
WBC # BLD AUTO: 5.4 K/UL (ref 4.8–10.8)

## 2019-05-18 PROCEDURE — 85025 COMPLETE CBC W/AUTO DIFF WBC: CPT

## 2019-05-18 PROCEDURE — 36415 COLL VENOUS BLD VENIPUNCTURE: CPT

## 2019-05-18 PROCEDURE — 80061 LIPID PANEL: CPT

## 2019-05-18 PROCEDURE — 80053 COMPREHEN METABOLIC PANEL: CPT

## 2019-05-18 PROCEDURE — 83036 HEMOGLOBIN GLYCOSYLATED A1C: CPT | Mod: GA

## 2019-06-03 ENCOUNTER — OFFICE VISIT (OUTPATIENT)
Dept: MEDICAL GROUP | Age: 74
End: 2019-06-03
Payer: MEDICARE

## 2019-06-03 VITALS
TEMPERATURE: 99 F | OXYGEN SATURATION: 93 % | DIASTOLIC BLOOD PRESSURE: 70 MMHG | HEIGHT: 59 IN | HEART RATE: 68 BPM | SYSTOLIC BLOOD PRESSURE: 120 MMHG | BODY MASS INDEX: 22.54 KG/M2 | WEIGHT: 111.8 LBS

## 2019-06-03 DIAGNOSIS — R09.82 ALLERGIC RHINITIS WITH POSTNASAL DRIP: ICD-10-CM

## 2019-06-03 DIAGNOSIS — Z11.59 NEED FOR HEPATITIS C SCREENING TEST: ICD-10-CM

## 2019-06-03 DIAGNOSIS — J30.9 ALLERGIC RHINITIS WITH POSTNASAL DRIP: ICD-10-CM

## 2019-06-03 DIAGNOSIS — I10 ESSENTIAL HYPERTENSION: ICD-10-CM

## 2019-06-03 DIAGNOSIS — Z91.81 RISK FOR FALLS: ICD-10-CM

## 2019-06-03 DIAGNOSIS — Z23 NEED FOR TDAP VACCINATION: ICD-10-CM

## 2019-06-03 DIAGNOSIS — R73.01 IFG (IMPAIRED FASTING GLUCOSE): ICD-10-CM

## 2019-06-03 DIAGNOSIS — E78.5 DYSLIPIDEMIA: ICD-10-CM

## 2019-06-03 DIAGNOSIS — Z23 NEED FOR SHINGLES VACCINE: ICD-10-CM

## 2019-06-03 DIAGNOSIS — J45.30 MILD PERSISTENT ASTHMA WITHOUT COMPLICATION: ICD-10-CM

## 2019-06-03 PROCEDURE — 99214 OFFICE O/P EST MOD 30 MIN: CPT | Performed by: INTERNAL MEDICINE

## 2019-06-03 RX ORDER — FLUTICASONE PROPIONATE 50 MCG
2 SPRAY, SUSPENSION (ML) NASAL DAILY
Qty: 16 G | Refills: 3 | Status: SHIPPED
Start: 2019-06-03 | End: 2020-01-17

## 2019-06-03 ASSESSMENT — PATIENT HEALTH QUESTIONNAIRE - PHQ9: CLINICAL INTERPRETATION OF PHQ2 SCORE: 0

## 2019-06-03 ASSESSMENT — PAIN SCALES - GENERAL: PAINLEVEL: NO PAIN

## 2019-06-03 ASSESSMENT — ACTIVITIES OF DAILY LIVING (ADL): BATHING_REQUIRES_ASSISTANCE: 0

## 2019-06-03 ASSESSMENT — ENCOUNTER SYMPTOMS: GENERAL WELL-BEING: FAIR

## 2019-06-03 NOTE — PROGRESS NOTES
Subjective:   Terrell Bangura is a 74 y.o. female here today for evaluation and management of:      Allergic rhinitis with postnasal drip  She has experienced an acute dry cough for the past two weeks. She is occasionally able to express green sputum. The cough is worsened with laying flat and is associated with post nasal drip, rhinorrhea, nasal congestion and increased sneezing. She experienced wheezing which resolved after four days with Qvar taken once daily and Levalbuterol as needed. Negative for fevers, chills, sore throat, ear pain, chest pain or shortness of breath. No recent ill contact. For the past several years, she has experienced intermittent episodes of dizziness a few times a year. She feels as though these episodes are increasing in frequency. She denies any syncopal episodes, gait instability, focal weakness or numbness.     Essential hypertension  Patient has a history of chronic hypertension and is taking Carvedilol 6.25 mg twice daily. No medication side effects were reported. They reportedly monitor their blood pressure regularly at home which averages at 110-120 systole. Blood pressure is stable today at 120/70.  They report following a low sodium diet and deny any related complaints including chronic cough, chest pain, headaches or dizziness.     Dyslipidemia  Chronic, stable history of dyslipidemia. Currently taking Atorvastatin 10 mg once daily as directed. No medication side effects were reported including myalgias or abdominal pain. She attempts to follow a high protein, low carbohydrate diet and exercises regularly. She walks for one hour on the treadmill. She is not taking a daily Aspirin. No acute complaints of dizziness, claudication or chest pain.    I reviewed and discussed the following labs with the patient.   Results for TERRELL BANGURA (MRN [id number]) as of 6/3/2019 15:50   Ref. Range 11/8/2018 06:48 5/18/2019 07:39   Cholesterol,Tot Latest Ref Range: 100 - 199 mg/dL  166 149   Triglycerides Latest Ref Range: 0 - 149 mg/dL 117 125   HDL Latest Ref Range: >=40 mg/dL 72 56   LDL Latest Ref Range: <100 mg/dL 71 68       Mild persistent asthma without complication  Chronic history of asthma which is treated with Qvar and Levalbuterol inhalers by allergy specialist. No acute complaints or concerns.      IFG (impaired fasting glucose)  Prior history of impaired fasting glucose. A1c was 6.3 on most recent labs dated 05/18/19. This is improved from her previous A1c of 6.6 in 11/2018. Currently managed by diet modification. Denies any vision changes, polyuria, polydipsia, polyphagia, numbness or tingling to their feet, lesions or opens wounds to their feet. Weight is minimally increased over the past year. She is attempting to follow a low carbohydrate, low processed sugar diet. She has modified from white to brown rice.    I reviewed and discussed the following labs with the patient.   Results for TERRELL MULLER (MRN [id number]) as of 6/3/2019 15:50   Ref. Range 11/14/2018 20:23 5/18/2019 07:39   Glycohemoglobin Latest Ref Range: 0.0 - 5.6 % 6.6 (H) 6.3 (H)       Current medicines (including changes today)  Current Outpatient Prescriptions   Medication Sig Dispense Refill   • fluticasone (FLONASE) 50 MCG/ACT nasal spray Spray 2 Sprays in nose every day. 16 g 3   • tetanus-dipth-acell pertussis (ADACEL) 5-2-15.5 LF-MCG/0.5 Suspension 0.5 mL by Intramuscular route Once PRN for up to 1 dose. 0.5 mL 0   • Zoster Vac Recomb Adjuvanted (SHINGRIX) 50 MCG/0.5ML Recon Susp 0.5 mL by Intramuscular route Once for 1 dose. 0.5 mL 0   • carvedilol (COREG) 6.25 MG Tab TAKE 1 TABLET TWICE A DAY WITH MEALS 180 Tab 3   • omeprazole (PRILOSEC) 20 MG delayed-release capsule Take 1 Cap by mouth every day. 30 Cap 2   • atorvastatin (LIPITOR) 10 MG Tab TAKE 1 TABLET DAILY 90 Tab 3   • cyanocobalamin (VITAMIN B-12) 500 MCG Tab Take 500 mcg by mouth every day.     • beclomethasone (QVAR) 80 MCG/ACT inhaler  "Inhale 1 Puff by mouth 2 times a day.     • Multiple Vitamins-Minerals (CENTRUM SILVER ADULT 50+ PO) Take  by mouth.     • levalbuterol (XOPENEX HFA) 45 MCG/ACT inhaler Inhale 1-2 Puffs by mouth every four hours as needed for Shortness of Breath.       No current facility-administered medications for this visit.      She  has a past medical history of Asthma; Diabetes (HCC); and GERD (gastroesophageal reflux disease).    ROS   Negative for vision changes, polyuria, polydipsia, polyphagia, numbness or tingling to their feet, lesions or opens wounds to their feet, syncopal episodes, gait instability, focal weakness or numbness, fevers, chills, ear pain, sore throat, chest pain, shortness of breath or abdominal pain. See HPI for further details.       Objective:     /70 (BP Location: Left arm, Patient Position: Sitting, BP Cuff Size: Adult)   Pulse 68   Temp 37.2 °C (99 °F) (Temporal)   Ht 1.504 m (4' 11.2\")   Wt 50.7 kg (111 lb 12.8 oz)   SpO2 93%  Body mass index is 22.43 kg/m².     Physical Exam:  General: Alert, oriented and no acute distress.  Eye contact is good, speech goal directed, affect calm  HEENT: conjunctiva non-injected, sclera non-icteric.  Oropharynx pink and moist with no lesions.  Pinna normal. TM pearly gray.   Neck No supraclavicular, submandibular, submental lymphadenopathy or masses in the neck or supraclavicular regions.  Lungs: Normal respiratory effort, clear to auscultation bilaterally with good excursion.  CV: regular rate and rhythm. No murmurs. No carotid bruits.  Abdomen: soft, non distended, nontender, No CVAT, Bowel sound normal.  Ext: no edema, color normal, vascularity normal, temperature normal  Neuro:  Awake, alert and oriented.  CN II-XII are grossly intact, Strength 5/5 symmetric and intact to lower extremities bilaterally. No focal deficits. Negative pronator drift.  No facial drooping.       Assessment and Plan:   The following treatment plan was discussed:    1. " Essential hypertension  - Well-controlled. Continue current regimen of Carvedilol 6.25 mg twice daily. Recheck lab 1-2 weeks before next follow up visit.  - Reviewed the risks and benefits as well as potential side effects of medications with patient.  - Discussed to eat low-sodium diet and encouraged to do regular physical exercise.  - Recommend to monitor blood pressure and heart rate at home.  - Comp Metabolic Panel; Future    2. Dyslipidemia  - Well-controlled. Continue current regimen of Atorvastatin 10 mg once daily in the evening. Recheck lab 1-2 weeks before next follow up visit.  - Reviewed the risks and benefits of treatment and potential side effects of medication.  - Advised to eat low fat, low carbohydrate and high fiber diet as well as do cardio physical exercise regularly. Continue walking on the treadmill for 30 minute intervals. Avoid walking for more time to prevent dizziness.  - Comp Metabolic Panel; Future  - Lipid Profile; Future    3. Mild persistent asthma without complication  - Under good control with daily use of Qvar as prescribed by allergy specialist.  Advised to rinse oral cavity after using Qvar inhaler.  She is taking Levalbuterol as needed.  Reviewed potential side effects of Qvar inhaler and albuterol inhaler with patient.    4. IFG (impaired fasting glucose)  - Well controlled. A1c is improved from 6.6 to 6.3 on her most recent labs. She has attempted to modify her diet and follow a low carbohydrate, low processed sugar diet.  Advised to do cardio exercise as tolerated and she can has treadmill 30 minutes daily if she tolerates.  - Plan to reevaluate labs 1-2 weeks prior to her next appointment.  - Comp Metabolic Panel; Future  - HEMOGLOBIN A1C; Future    5. Allergic rhinitis with postnasal drip  - She complaints of an acute dry cough associated with occasional sputum production. Additionally experiencing nasal congestion, rhinorrhea and increased seasonal allergies. Antibiotics  are not indicated at this time. No evidence of otitis media or sinusitis on exam.  - Continue use of Qvar and Levalbuterol inhalers as prescribed.  - Nasal irrigation followed by Flonase are recommended to decrease nasal drip. She may perform steam treatments as well.  - Frequent salt water gargles are recommended. Drink warm fluids.   - She may take Zyrtec 10 mg nightly.   - She may additionally start an over the counter Mucinex for cough and chest congestion.   - Seek medical attention for worsened symptoms including difficulty breathing, shortness of breath or wheezing.  - fluticasone (FLONASE) 50 MCG/ACT nasal spray; Spray 2 Sprays in nose every day.  Dispense: 16 g; Refill: 3    6. Risk for falls  - Counseling for getting up slowly, turn on the light when she gets up at night, installing nightlight at home.  - Review side effects of medications with patient. Recommend to avoid sedating medication as much as possible.   - Discussed to have regular physical exercise to improve muscle strength and gait.  Advised to seek medical attention if she has vertigo or syncope.  - Patient identified as fall risk.  Appropriate orders and counseling given.    7. Need for hepatitis C screening test  - She is due for hepatitis C screening at her current age. No prior history of blood transfusions or IV drug use. No known exposure via sexual intercourse.  - HEP C VIRUS ANTIBODY; Future     8. Need for Tdap vaccination  - Tdap vaccine was prescribed today after reviewing risks and benefits as well as side effects of vaccine.  - tetanus-dipth-acell pertussis (ADACEL) 5-2-15.5 LF-MCG/0.5 Suspension; 0.5 mL by Intramuscular route Once PRN for up to 1 dose.  Dispense: 0.5 mL; Refill: 0    9. Need for shingles vaccine  - Shingrix vaccine was prescribed today after reviewing risks and benefits as well as side effects of vaccine.  - Zoster Vac Recomb Adjuvanted (SHINGRIX) 50 MCG/0.5ML Recon Susp; 0.5 mL by Intramuscular route Once for 1  dose.  Dispense: 0.5 mL; Refill: 0      Follow up: Return in about 6 months (around 12/3/2019) for Hypertension, Hyperlipidemia, Impaired fasting glucose, Asthma, Lab review.      Please note that this dictation was created using voice recognition software. I have made every reasonable attempt to correct obvious errors, but I expect that there may have unintended errors in text, spelling, punctuation, or grammar that I did not discover.    I, Jennifer Abreu (Scribe), am scribing for, and in the presence of, Wendy Galan M.D.    Electronically signed by: Jennifer Abreu (Scribe), 6/3/2019    I, Wendy Galan M.D., personally performed the services described in this documentation, as scribed by Jennifer Abreu in my presence, and it is both accurate and complete.

## 2019-06-04 ENCOUNTER — OFFICE VISIT (OUTPATIENT)
Dept: URGENT CARE | Facility: PHYSICIAN GROUP | Age: 74
End: 2019-06-04
Payer: MEDICARE

## 2019-06-04 ENCOUNTER — HOSPITAL ENCOUNTER (OUTPATIENT)
Facility: MEDICAL CENTER | Age: 74
End: 2019-06-04
Attending: PHYSICIAN ASSISTANT
Payer: MEDICARE

## 2019-06-04 VITALS
BODY MASS INDEX: 22.38 KG/M2 | DIASTOLIC BLOOD PRESSURE: 82 MMHG | HEART RATE: 69 BPM | WEIGHT: 111 LBS | RESPIRATION RATE: 12 BRPM | OXYGEN SATURATION: 97 % | SYSTOLIC BLOOD PRESSURE: 144 MMHG | HEIGHT: 59 IN | TEMPERATURE: 97.1 F

## 2019-06-04 DIAGNOSIS — R30.0 DYSURIA: ICD-10-CM

## 2019-06-04 DIAGNOSIS — N30.01 ACUTE CYSTITIS WITH HEMATURIA: ICD-10-CM

## 2019-06-04 LAB
APPEARANCE UR: CLEAR
BILIRUB UR STRIP-MCNC: NORMAL MG/DL
COLOR UR AUTO: YELLOW
GLUCOSE UR STRIP.AUTO-MCNC: NORMAL MG/DL
KETONES UR STRIP.AUTO-MCNC: NORMAL MG/DL
LEUKOCYTE ESTERASE UR QL STRIP.AUTO: NORMAL
NITRITE UR QL STRIP.AUTO: NORMAL
PH UR STRIP.AUTO: 6 [PH] (ref 5–8)
PROT UR QL STRIP: NORMAL MG/DL
RBC UR QL AUTO: NORMAL
SP GR UR STRIP.AUTO: <=1.005
UROBILINOGEN UR STRIP-MCNC: 0.2 MG/DL

## 2019-06-04 PROCEDURE — 87077 CULTURE AEROBIC IDENTIFY: CPT

## 2019-06-04 PROCEDURE — 81002 URINALYSIS NONAUTO W/O SCOPE: CPT | Performed by: PHYSICIAN ASSISTANT

## 2019-06-04 PROCEDURE — 87186 SC STD MICRODIL/AGAR DIL: CPT

## 2019-06-04 PROCEDURE — 99214 OFFICE O/P EST MOD 30 MIN: CPT | Performed by: PHYSICIAN ASSISTANT

## 2019-06-04 PROCEDURE — 87086 URINE CULTURE/COLONY COUNT: CPT

## 2019-06-04 RX ORDER — SULFAMETHOXAZOLE AND TRIMETHOPRIM 800; 160 MG/1; MG/1
1 TABLET ORAL 2 TIMES DAILY
Qty: 14 TAB | Refills: 0 | Status: SHIPPED | OUTPATIENT
Start: 2019-06-04 | End: 2019-06-11

## 2019-06-04 ASSESSMENT — ENCOUNTER SYMPTOMS
FLANK PAIN: 0
FEVER: 0
ABDOMINAL PAIN: 0
CHILLS: 1

## 2019-06-04 NOTE — PROGRESS NOTES
Subjective:      Ila Bangura is a 74 y.o. female who presents with Urinary Frequency (with very little out put, painful urination x this AM)    PMH:  has a past medical history of Asthma; Diabetes (HCC); and GERD (gastroesophageal reflux disease).  MEDS:   Current Outpatient Prescriptions:   •  fluticasone (FLONASE) 50 MCG/ACT nasal spray, Spray 2 Sprays in nose every day., Disp: 16 g, Rfl: 3  •  carvedilol (COREG) 6.25 MG Tab, TAKE 1 TABLET TWICE A DAY WITH MEALS, Disp: 180 Tab, Rfl: 3  •  omeprazole (PRILOSEC) 20 MG delayed-release capsule, Take 1 Cap by mouth every day., Disp: 30 Cap, Rfl: 2  •  atorvastatin (LIPITOR) 10 MG Tab, TAKE 1 TABLET DAILY, Disp: 90 Tab, Rfl: 3  •  cyanocobalamin (VITAMIN B-12) 500 MCG Tab, Take 500 mcg by mouth every day., Disp: , Rfl:   •  beclomethasone (QVAR) 80 MCG/ACT inhaler, Inhale 1 Puff by mouth 2 times a day., Disp: , Rfl:   •  Multiple Vitamins-Minerals (CENTRUM SILVER ADULT 50+ PO), Take  by mouth., Disp: , Rfl:   •  levalbuterol (XOPENEX HFA) 45 MCG/ACT inhaler, Inhale 1-2 Puffs by mouth every four hours as needed for Shortness of Breath., Disp: , Rfl:   •  tetanus-dipth-acell pertussis (ADACEL) 5-2-15.5 LF-MCG/0.5 Suspension, 0.5 mL by Intramuscular route Once PRN for up to 1 dose. (Patient not taking: Reported on 6/4/2019), Disp: 0.5 mL, Rfl: 0  ALLERGIES:   Allergies   Allergen Reactions   • Augmentin Hives     Hard to breath   • Aspirin Nausea     Heart burn, even with omeprazole     SURGHX:   Past Surgical History:   Procedure Laterality Date   • DENTAL SURGERY  2016    bone graft   • COLONOSCOPY      negative   • UPPER OR LOWER GI PROCEDURE WITH ANESTHESIA      x 2 upper GI     SOCHX:  reports that she has never smoked. She has never used smokeless tobacco. She reports that she does not drink alcohol or use drugs.  FH: Reviewed with patient, not pertinent to this visit.           Patient presents with:  Urinary Frequency: with very little out put, painful  urination x this AM. History of UTI in past, feels same.           Urinary Frequency   This is a new problem. The current episode started today. The problem occurs constantly. The problem has been rapidly worsening. Associated symptoms include chills and urinary symptoms. Pertinent negatives include no abdominal pain or fever. Exacerbated by: urination  She has tried drinking for the symptoms. The treatment provided no relief.       Review of Systems   Constitutional: Positive for chills. Negative for fever.   Gastrointestinal: Negative for abdominal pain.   Genitourinary: Positive for dysuria, frequency and urgency. Negative for flank pain and hematuria.   All other systems reviewed and are negative.         Objective:     LMP  (LMP Unknown)      Physical Exam   Constitutional: She is oriented to person, place, and time. She appears well-developed and well-nourished. No distress.   HENT:   Head: Normocephalic and atraumatic.   Nose: Nose normal.   Mouth/Throat: Oropharynx is clear and moist.   Eyes: Pupils are equal, round, and reactive to light. Conjunctivae and EOM are normal.   Neck: Normal range of motion. Neck supple.   Cardiovascular: Normal rate, regular rhythm and normal heart sounds.    Pulmonary/Chest: Effort normal and breath sounds normal.   Abdominal: Soft. Bowel sounds are normal. There is no rebound and no guarding.   Musculoskeletal: Normal range of motion.   Neurological: She is alert and oriented to person, place, and time. Gait normal.   Skin: Skin is warm and dry. Capillary refill takes less than 2 seconds.   Psychiatric: She has a normal mood and affect.   Nursing note and vitals reviewed.         UA results interpreted by me: +LE, +blood     Assessment/Plan:      1. Acute cystitis with hematuria  sulfamethoxazole-trimethoprim (BACTRIM DS) 800-160 MG tablet    POCT Urinalysis    Urine Culture   2. Dysuria  sulfamethoxazole-trimethoprim (BACTRIM DS) 800-160 MG tablet    POCT Urinalysis    Urine  Culture     PT can continue OTC medications, increase fluids and rest until symptoms improve.     PT should follow up with PCP in 1-2 days for re-evaluation if symptoms have not improved.  Discussed red flags and reasons to return to UC or ED.  Pt and/or family verbalized understanding of diagnosis and follow up instructions and was offered informational handout on diagnosis.  PT discharged.

## 2019-07-21 ENCOUNTER — HOSPITAL ENCOUNTER (OUTPATIENT)
Facility: MEDICAL CENTER | Age: 74
End: 2019-07-21
Attending: PHYSICIAN ASSISTANT
Payer: MEDICARE

## 2019-07-21 ENCOUNTER — OFFICE VISIT (OUTPATIENT)
Dept: URGENT CARE | Facility: PHYSICIAN GROUP | Age: 74
End: 2019-07-21
Payer: MEDICARE

## 2019-07-21 VITALS
DIASTOLIC BLOOD PRESSURE: 78 MMHG | WEIGHT: 111 LBS | OXYGEN SATURATION: 98 % | SYSTOLIC BLOOD PRESSURE: 136 MMHG | RESPIRATION RATE: 16 BRPM | HEIGHT: 59 IN | BODY MASS INDEX: 22.38 KG/M2 | TEMPERATURE: 99.4 F | HEART RATE: 76 BPM

## 2019-07-21 DIAGNOSIS — N30.00 ACUTE CYSTITIS WITHOUT HEMATURIA: ICD-10-CM

## 2019-07-21 DIAGNOSIS — R30.0 DYSURIA: ICD-10-CM

## 2019-07-21 LAB
APPEARANCE UR: NORMAL
BILIRUB UR STRIP-MCNC: NORMAL MG/DL
COLOR UR AUTO: YELLOW
GLUCOSE UR STRIP.AUTO-MCNC: NORMAL MG/DL
KETONES UR STRIP.AUTO-MCNC: NORMAL MG/DL
LEUKOCYTE ESTERASE UR QL STRIP.AUTO: NORMAL
NITRITE UR QL STRIP.AUTO: NORMAL
PH UR STRIP.AUTO: 6 [PH] (ref 5–8)
PROT UR QL STRIP: NORMAL MG/DL
RBC UR QL AUTO: NORMAL
SP GR UR STRIP.AUTO: <=1.005
UROBILINOGEN UR STRIP-MCNC: 0.2 MG/DL

## 2019-07-21 PROCEDURE — 87077 CULTURE AEROBIC IDENTIFY: CPT

## 2019-07-21 PROCEDURE — 87086 URINE CULTURE/COLONY COUNT: CPT

## 2019-07-21 PROCEDURE — 87186 SC STD MICRODIL/AGAR DIL: CPT

## 2019-07-21 PROCEDURE — 81002 URINALYSIS NONAUTO W/O SCOPE: CPT | Performed by: PHYSICIAN ASSISTANT

## 2019-07-21 PROCEDURE — 99214 OFFICE O/P EST MOD 30 MIN: CPT | Performed by: PHYSICIAN ASSISTANT

## 2019-07-21 RX ORDER — NITROFURANTOIN 25; 75 MG/1; MG/1
100 CAPSULE ORAL 2 TIMES DAILY
Qty: 10 CAP | Refills: 0 | Status: SHIPPED | OUTPATIENT
Start: 2019-07-21 | End: 2019-09-17

## 2019-07-21 ASSESSMENT — ENCOUNTER SYMPTOMS
VOMITING: 0
FEVER: 0
NEUROLOGICAL NEGATIVE: 1
FLANK PAIN: 0
DIARRHEA: 0
RESPIRATORY NEGATIVE: 1
NAUSEA: 0
CARDIOVASCULAR NEGATIVE: 1
ABDOMINAL PAIN: 0
CHILLS: 0

## 2019-07-21 NOTE — PROGRESS NOTES
Subjective:      Ila Bangura is a 74 y.o. female who presents with Dysuria (x 1 day)            Dysuria    This is a new problem. The current episode started yesterday. The problem occurs every urination. The problem has been gradually worsening. The quality of the pain is described as burning. There has been no fever. The fever has been present for less than 1 day. There is no history of pyelonephritis. Associated symptoms include frequency and urgency. Pertinent negatives include no chills, flank pain, hematuria, hesitancy, nausea or vomiting. She has tried NSAIDs for the symptoms. The treatment provided mild relief. There is no history of kidney stones or recurrent UTIs.       PMH:  has a past medical history of Asthma; Diabetes (HCC); and GERD (gastroesophageal reflux disease).  MEDS:   Current Outpatient Prescriptions:   •  carvedilol (COREG) 6.25 MG Tab, TAKE 1 TABLET TWICE A DAY WITH MEALS, Disp: 180 Tab, Rfl: 3  •  omeprazole (PRILOSEC) 20 MG delayed-release capsule, Take 1 Cap by mouth every day., Disp: 30 Cap, Rfl: 2  •  atorvastatin (LIPITOR) 10 MG Tab, TAKE 1 TABLET DAILY, Disp: 90 Tab, Rfl: 3  •  beclomethasone (QVAR) 80 MCG/ACT inhaler, Inhale 1 Puff by mouth 2 times a day., Disp: , Rfl:   •  Multiple Vitamins-Minerals (CENTRUM SILVER ADULT 50+ PO), Take  by mouth., Disp: , Rfl:   •  fluticasone (FLONASE) 50 MCG/ACT nasal spray, Spray 2 Sprays in nose every day., Disp: 16 g, Rfl: 3  •  tetanus-dipth-acell pertussis (ADACEL) 5-2-15.5 LF-MCG/0.5 Suspension, 0.5 mL by Intramuscular route Once PRN for up to 1 dose. (Patient not taking: Reported on 6/4/2019), Disp: 0.5 mL, Rfl: 0  •  cyanocobalamin (VITAMIN B-12) 500 MCG Tab, Take 500 mcg by mouth every day., Disp: , Rfl:   •  levalbuterol (XOPENEX HFA) 45 MCG/ACT inhaler, Inhale 1-2 Puffs by mouth every four hours as needed for Shortness of Breath., Disp: , Rfl:   ALLERGIES:   Allergies   Allergen Reactions   • Augmentin Hives     Hard to breath  "  • Aspirin Nausea     Heart burn, even with omeprazole     SURGHX:   Past Surgical History:   Procedure Laterality Date   • DENTAL SURGERY  2016    bone graft   • COLONOSCOPY      negative   • UPPER OR LOWER GI PROCEDURE WITH ANESTHESIA      x 2 upper GI     SOCHX:  reports that she has never smoked. She has never used smokeless tobacco. She reports that she does not drink alcohol or use drugs.  FH: family history includes Cancer in her father, maternal grandmother, and sister; Diabetes in her brother, brother, and sister; Lung Disease in her father; No Known Problems in her maternal grandfather; Other in her mother.    Review of Systems   Constitutional: Negative for chills and fever.   Respiratory: Negative.    Cardiovascular: Negative.    Gastrointestinal: Negative for abdominal pain, diarrhea, nausea and vomiting.   Genitourinary: Positive for dysuria, frequency and urgency. Negative for flank pain, hematuria and hesitancy.   Neurological: Negative.        Medications, Allergies, and current problem list reviewed today in Epic     Objective:     /78   Pulse 76   Temp 37.4 °C (99.4 °F)   Resp 16   Ht 1.499 m (4' 11\")   Wt 50.3 kg (111 lb)   LMP  (LMP Unknown)   SpO2 98%   BMI 22.42 kg/m²      Physical Exam   Constitutional: She is oriented to person, place, and time. She appears well-developed and well-nourished. No distress.   HENT:   Head: Normocephalic and atraumatic.   Right Ear: External ear normal.   Left Ear: External ear normal.   Eyes: Conjunctivae are normal.   Neck: Normal range of motion. Neck supple.   Cardiovascular: Normal rate, regular rhythm and normal heart sounds.    Pulmonary/Chest: Effort normal and breath sounds normal. No respiratory distress. She has no wheezes. She has no rales.   Abdominal: Soft. She exhibits no distension. There is no tenderness. There is no rebound and no guarding.   Neurological: She is alert and oriented to person, place, and time.   Skin: Skin is warm " and dry. She is not diaphoretic.   Psychiatric: She has a normal mood and affect. Her behavior is normal. Judgment and thought content normal.   Nursing note and vitals reviewed.              Assessment/Plan:     1. Dysuria  POCT Urinalysis   2. Acute cystitis without hematuria  nitrofurantoin monohyd macro (MACROBID) 100 MG Cap    URINE CULTURE(NEW)     Urinalysis: Cloudy urine, positive leukocytes    Macrobid chosen due to allergy list and recent Bactrim use.  GFR from 3 months ago within normal limit  Take full course of antibiotic  Urine culture, I will only call patient if I need to change antibiotic pending results  Significantly increase fluids  OTC Motrin or Azo as needed  Cranberry as tolerated    Return to clinic or go to ED if symptoms worsen or persist. Indications for ED discussed at length. Patient voices understanding. Follow-up with your primary care provider in 3-5 days. Red flags discussed. All side effects of medication discussed including allergic response, GI upset, tendon injury, etc.    Please note that this dictation was created using voice recognition software. I have made every reasonable attempt to correct obvious errors, but I expect that there are errors of grammar and possibly content that I did not discover before finalizing the note.

## 2019-08-05 ENCOUNTER — TELEPHONE (OUTPATIENT)
Dept: MEDICAL GROUP | Age: 74
End: 2019-08-05

## 2019-08-06 NOTE — TELEPHONE ENCOUNTER
Phone Number Called: [phone] (home)       Call outcome: left message for patient to call back regarding message below    Message: Pt lvm (Unable to make out message). Returned call. No answer. LVM requesting callback.

## 2019-08-09 DIAGNOSIS — K21.9 GASTROESOPHAGEAL REFLUX DISEASE WITHOUT ESOPHAGITIS: ICD-10-CM

## 2019-08-09 RX ORDER — OMEPRAZOLE 20 MG/1
CAPSULE, DELAYED RELEASE ORAL
Qty: 90 CAP | Refills: 3 | Status: SHIPPED | OUTPATIENT
Start: 2019-08-09 | End: 2020-08-04

## 2019-08-27 ENCOUNTER — TELEPHONE (OUTPATIENT)
Dept: MEDICAL GROUP | Age: 74
End: 2019-08-27

## 2019-08-27 NOTE — TELEPHONE ENCOUNTER
VOICEMAIL  1. Caller Name: Ila Bangura                        Call Back Number: [phone] (home)       2. Message: Pt lvm requesting referral to ENT, however didn't state reason of why. Return pt call. No answer. LVM to return call.     Per Dr. Galan reason is needed first to determine if pt needs to schedule appoint with her before placing a referral.  Presently awaiting to hear from pt.    3. Patient approves office to leave a detailed voicemail/MyChart message: N\A      Dr. Angelia HELLER

## 2019-08-29 ENCOUNTER — TELEPHONE (OUTPATIENT)
Dept: MEDICAL GROUP | Age: 74
End: 2019-08-29

## 2019-08-29 NOTE — TELEPHONE ENCOUNTER
VOICEMAIL  1. Caller Name: Ila Bangura                        Call Back Number: [phone]    2. Message: Returned pt call. Pt states tongue is sore. Offered to schedule soonest available appointment. Per request scheduled for 09/03/19, however pt request to cancel afterwards. Pt wants an appointment with her spouse, who she request to also be seen by Dr. Galan. Advised soonest availability for back to back appointments with spouse is 09/17/19. Pt declined. States she will first contact her insurance company for coverage cost & other insurance related questions.    3. Patient approves office to leave a detailed voicemail/MyChart message: N\A

## 2019-09-17 ENCOUNTER — OFFICE VISIT (OUTPATIENT)
Dept: MEDICAL GROUP | Age: 74
End: 2019-09-17
Payer: MEDICARE

## 2019-09-17 VITALS
BODY MASS INDEX: 21.83 KG/M2 | DIASTOLIC BLOOD PRESSURE: 70 MMHG | HEART RATE: 67 BPM | HEIGHT: 60 IN | SYSTOLIC BLOOD PRESSURE: 132 MMHG | TEMPERATURE: 96.4 F | WEIGHT: 111.2 LBS | OXYGEN SATURATION: 98 %

## 2019-09-17 DIAGNOSIS — B37.0 ORAL THRUSH: ICD-10-CM

## 2019-09-17 DIAGNOSIS — K14.8 LESION OF TONGUE: ICD-10-CM

## 2019-09-17 DIAGNOSIS — H91.93 DECREASED HEARING OF BOTH EARS: ICD-10-CM

## 2019-09-17 PROCEDURE — 99214 OFFICE O/P EST MOD 30 MIN: CPT | Performed by: INTERNAL MEDICINE

## 2019-09-17 RX ORDER — ZOSTER VACCINE RECOMBINANT, ADJUVANTED 50 MCG/0.5
KIT INTRAMUSCULAR
Refills: 0 | COMMUNITY
Start: 2019-07-20 | End: 2019-09-17

## 2019-09-17 RX ORDER — BECLOMETHASONE DIPROPIONATE HFA 80 UG/1
AEROSOL, METERED RESPIRATORY (INHALATION)
COMMUNITY
Start: 2019-07-15 | End: 2019-09-17

## 2019-09-17 ASSESSMENT — PAIN SCALES - GENERAL: PAINLEVEL: 8=MODERATE-SEVERE PAIN

## 2019-09-17 NOTE — PROGRESS NOTES
Subjective:   Ila Bangura is a 74 y.o. female here today for evaluation and management of:    Lesion of tongue  Oral thrush  Acute complaint. Patient presents with tongue issues for the past several months. She reports noticing a white painful lesion to her tongue since her last clinic visit in June that has been progressively getting worse and increasing in size. She states that she experiences severe burning sensation when she brushes her teeth or when she eats acidic or salty foods. Additionally, patient states that she bit the left side of her tongue a few months ago that has since never resolved. She was seen by the dentist and received an ill fitting implant that caused trauma to the left side of her tongue.  Patient states that she tries to scrape the white coating and white plaque from her tongue and oral cavity with tooth brush every day.  Patient is also still using Qvar which she uses for allergic asthma with postnasal drip. She has started gargling with salt water and continues to take B12 supplements. Denies difficulty on swallowing or painful swallowing.     Decreased hearing of both ears  Patient is requesting referral to test for hearing as she has been having decreased hearing on bilateral ears for the past 3 months. She states that she has been having to turn up the TV louder than usual in order to hear.       Current medicines (including changes today)  Current Outpatient Medications   Medication Sig Dispense Refill   • nystatin (MYCOSTATIN) 608370 UNIT/ML Suspension Take 5 mL by mouth 4 times a day for 14 days. 280 mL 0   • omeprazole (PRILOSEC) 20 MG delayed-release capsule TAKE 1 CAPSULE DAILY 90 Cap 3   • fluticasone (FLONASE) 50 MCG/ACT nasal spray Spray 2 Sprays in nose every day. 16 g 3   • carvedilol (COREG) 6.25 MG Tab TAKE 1 TABLET TWICE A DAY WITH MEALS 180 Tab 3   • atorvastatin (LIPITOR) 10 MG Tab TAKE 1 TABLET DAILY 90 Tab 3   • cyanocobalamin (VITAMIN B-12) 500 MCG Tab Take  "500 mcg by mouth every day.     • beclomethasone (QVAR) 80 MCG/ACT inhaler Inhale 1 Puff by mouth 2 times a day.     • Multiple Vitamins-Minerals (CENTRUM SILVER ADULT 50+ PO) Take  by mouth.     • levalbuterol (XOPENEX HFA) 45 MCG/ACT inhaler Inhale 1-2 Puffs by mouth every four hours as needed for Shortness of Breath.       No current facility-administered medications for this visit.      She  has a past medical history of Asthma, Diabetes (HCC), and GERD (gastroesophageal reflux disease).    ROS   Positive for painful lesion on the left thumb, white thrush in oral cavity, positive for decreased hearing on both years.  She reports no chest pain, no shortness of breath, no abdominal pain.       Objective:     /70 (BP Location: Left arm, Patient Position: Sitting, BP Cuff Size: Adult)   Pulse 67   Temp (!) 35.8 °C (96.4 °F) (Temporal)   Ht 1.515 m (4' 11.65\")   Wt 50.4 kg (111 lb 3.2 oz)   SpO2 98%  Body mass index is 21.98 kg/m².     Physical Exam:  General: Alert, oriented and no acute distress.  Eye contact is good, speech goal directed, affect calm  HEENT: conjunctiva non-injected, sclera non-icteric.  Oral mucous membranes pink and moist with no lesions.  Mixed erythematous and white trash on her tongue and oropharynx.  A small erythematous bump on left dorsal surface of the tongue which is tender on touch with a tongue suppressor.  Pinna normal. TM pearly gray.  No cerumen impaction, no signs of ear infection in both ears.  Neck No supraclavicular, submandibular, submental lymphadenopathy or masses in the neck or supraclavicular regions.  Lungs: Normal respiratory effort, clear to auscultation bilaterally with good excursion.  CV: regular rate and rhythm. No murmurs.  Abdomen: soft, non distended, nontender, Bowel sound normal.  Ext: no edema, color normal, vascularity normal, temperature normal      Assessment and Plan:   The following treatment plan was discussed     1. Lesion of tongue  - Patient " presents with a progressively worsening tongue lesion. Discussed possible explanations for her lesion with the patient including, but not limited to Candida yeast infection, vitamins deficiency, leukoplakia, malignant lesion. Advised that she try medication first to treat fungal infection and if not resolved follow-up with oral surgeon to further evaluate.  It is better to evaluate by oral surgeon for unhealed sore on the left side of the tongue for more than 3 months. Patient agrees with this plan. She will try gargling with Nystatin swish and spit first and then if not resolved or improved a referral is placed for oral surgeon.   - Patient is instructed to rinse with water prior to using Nystatin. She can gargle Nystatin 5 mL for 2-3 minutes 4 times a day for 10 days.  - Advised that she start taking B-complex vitamins in addition to her multivitamins.  Patient does not drink alcohol.  - Advised to avoid acidic or salty foods.  - Further recommended that she can take OTC Lidocaine gel.   - REFERRAL TO ORAL MAXILLOFACIAL SURGERY    2. Oral thrush  - See 1 above.   - Reviewed the risks and benefits of treatment as well as medication side effects of Nystatin. She will stop this medication if she experiences side effects.  - nystatin (MYCOSTATIN) 920038 UNIT/ML Suspension; Take 5 mL by mouth 4 times a day for 14 days.  Dispense: 280 mL; Refill: 0    3. Decreased hearing of both ears  - Patient is having decreased hearing to bilateral ears and is requesting a referral to see ENT  - REFERRAL TO AUDIOLOGY      Followup: Return in about 2 weeks (around 10/1/2019), or if symptoms worsen or fail to improve, for Oral thrush, tongue lesion.      Please note that this dictation was created using voice recognition software. I have made every reasonable attempt to correct obvious errors, but I expect that there may have unintended errors in text, spelling, punctuation, or grammar that I did not discover.    I, Tiffany Vela  (Scribe), am scribing for, and in the presence of, Wendy Galan M.D..    Electronically signed by: Tiffany Vela (Eliseibrosemarie), 9/17/2019    I, Wendy Galan M.D., personally performed the services described in this documentation, as scribed by Tiffany Vela in my presence, and it is both accurate and complete.

## 2019-09-29 DIAGNOSIS — E78.5 DYSLIPIDEMIA: ICD-10-CM

## 2019-09-30 RX ORDER — ATORVASTATIN CALCIUM 10 MG/1
TABLET, FILM COATED ORAL
Qty: 90 TAB | Refills: 4 | Status: SHIPPED | OUTPATIENT
Start: 2019-09-30 | End: 2019-12-11 | Stop reason: SDUPTHER

## 2019-10-01 ENCOUNTER — OFFICE VISIT (OUTPATIENT)
Dept: MEDICAL GROUP | Age: 74
End: 2019-10-01
Payer: MEDICARE

## 2019-10-01 VITALS
OXYGEN SATURATION: 98 % | SYSTOLIC BLOOD PRESSURE: 110 MMHG | BODY MASS INDEX: 21.79 KG/M2 | TEMPERATURE: 98.6 F | WEIGHT: 111 LBS | HEIGHT: 60 IN | RESPIRATION RATE: 16 BRPM | HEART RATE: 74 BPM | DIASTOLIC BLOOD PRESSURE: 62 MMHG

## 2019-10-01 DIAGNOSIS — B37.0 ORAL THRUSH: ICD-10-CM

## 2019-10-01 PROCEDURE — 99213 OFFICE O/P EST LOW 20 MIN: CPT | Performed by: INTERNAL MEDICINE

## 2019-10-01 NOTE — PROGRESS NOTES
Subjective:   Ila Bangura is a 74 y.o. female here today for evaluation and management of:    Oral Thrush  Patient was last seen by me on 9/17/10 for painful lesions to her tongue since June. She was prescribed Nystatin 100,000 unit/mL for treatment and was instructed to rinse her mouth with Nystatin 5 mL 4 times a day for 14 days. She notes her pain is improving, however, oral thrush has not completely resolved and describes her current pain as burning when she brushes her teeth. She denies any pain in her throat.  She denies painful swallowing.  She still has a small bump on left dorsal surface of the tongue which slightly decreased in size after using nystatin swish and spit.  She has not followed up with her referral to an oral surgeon.       Current medicines (including changes today)  Current Outpatient Medications   Medication Sig Dispense Refill   • nystatin (MYCOSTATIN) 420393 UNIT/ML Suspension Take 5 mL by mouth 4 times a day for 14 days. 280 mL 0   • atorvastatin (LIPITOR) 10 MG Tab TAKE 1 TABLET DAILY 90 Tab 4   • omeprazole (PRILOSEC) 20 MG delayed-release capsule TAKE 1 CAPSULE DAILY 90 Cap 3   • fluticasone (FLONASE) 50 MCG/ACT nasal spray Spray 2 Sprays in nose every day. 16 g 3   • carvedilol (COREG) 6.25 MG Tab TAKE 1 TABLET TWICE A DAY WITH MEALS 180 Tab 3   • cyanocobalamin (VITAMIN B-12) 500 MCG Tab Take 500 mcg by mouth every day.     • beclomethasone (QVAR) 80 MCG/ACT inhaler Inhale 1 Puff by mouth 2 times a day.     • Multiple Vitamins-Minerals (CENTRUM SILVER ADULT 50+ PO) Take  by mouth.     • levalbuterol (XOPENEX HFA) 45 MCG/ACT inhaler Inhale 1-2 Puffs by mouth every four hours as needed for Shortness of Breath.       No current facility-administered medications for this visit.      She  has a past medical history of Asthma, Diabetes (HCC), and GERD (gastroesophageal reflux disease).    ROS   Positive for oral thrush.  Patient denies painful swallowing or difficulty for  "swallowing.  No chest pain, no shortness of breath, no abdominal pain.       Objective:     /62   Pulse 74   Temp 37 °C (98.6 °F)   Resp 16   Ht 1.515 m (4' 11.65\")   Wt 50.3 kg (111 lb)   SpO2 98%  Body mass index is 21.93 kg/m².   Physical Exam:  General: Alert, oriented and no acute distress.  Eye contact is good, speech goal directed, affect calm  HEENT: conjunctiva non-injected, sclera non-icteric.  Oral mucous membranes pink and moist with no lesions.  Yellowish-white plaque on her tongue mostly on the right dorsal surface of the tongue and has a small erythematous bump on the left dorsal surface of the tongue.  Pinna normal.   Lungs: Normal respiratory effort, clear to auscultation bilaterally with good excursion.  CV: regular rate and rhythm. No murmurs.   Abdomen: soft, non distended, nontender, Bowel sound normal.  Ext: no edema, color normal, vascularity normal, temperature normal    Assessment and Plan:   The following treatment plan was discussed     1. Oral Thrush  - Advised patient to follow-up with the oral surgeon to further evaluate symptoms. Informed patient that I expected her symptoms to be resolved by now, however, she is still experiencing mild pain and has ongoing white patch on tongue. She is not interested in attempting anti-fungal oral tablet.   - Discussed not scrapping tongue until after she sees oral surgeon. Advised patient to not take steroids inhaler as possible, because this may slow down the healing process, however, she notes that she uses Breo inhaler for asthma.  She states that she has appointment with allergy specialist next week.  I advised her to discuss with allergy specialist to adjust her dose of steroid inhaler or switch to different regimen.  - She still has a small bump on the left dorsal surface of her tongue.  I recommended her to follow with oral surgeon for further evaluation of this lesion.  I also reprinted contact information of oral surgeon for " patient and she will call to schedule an appointment with oral surgeon.  - nystatin (MYCOSTATIN) 609380 UNIT/ML Suspension; Take 5 mL by mouth 4 times a day for 14 days.  Dispense: 280 mL; Refill: 0      Followup: Return in about 4 weeks (around 10/29/2019), or if symptoms worsen or fail to improve, for Oral thrush and lesion of tongue.      Please note that this dictation was created using voice recognition software. I have made every reasonable attempt to correct obvious errors, but I expect that there may have unintended errors in text, spelling, punctuation, or grammar that I did not discover.    ITiffany (Evelia), am scribing for, and in the presence of, Wendy Galan M.D..    Electronically signed by: Tiffany Ocasio (Evelia), 10/1/2019    IWendy M.D., personally performed the services described in this documentation, as scribed by Tiffany Ocasio in my presence, and it is both accurate and complete.

## 2019-11-17 ENCOUNTER — OFFICE VISIT (OUTPATIENT)
Dept: URGENT CARE | Facility: PHYSICIAN GROUP | Age: 74
End: 2019-11-17
Payer: MEDICARE

## 2019-11-17 VITALS
HEIGHT: 60 IN | OXYGEN SATURATION: 94 % | TEMPERATURE: 98.4 F | SYSTOLIC BLOOD PRESSURE: 130 MMHG | DIASTOLIC BLOOD PRESSURE: 62 MMHG | HEART RATE: 80 BPM | WEIGHT: 110 LBS | BODY MASS INDEX: 21.6 KG/M2

## 2019-11-17 DIAGNOSIS — R05.9 COUGH: ICD-10-CM

## 2019-11-17 DIAGNOSIS — J98.8 RTI (RESPIRATORY TRACT INFECTION): ICD-10-CM

## 2019-11-17 PROCEDURE — 99214 OFFICE O/P EST MOD 30 MIN: CPT | Performed by: NURSE PRACTITIONER

## 2019-11-17 RX ORDER — BENZONATATE 200 MG/1
200 CAPSULE ORAL EVERY 8 HOURS PRN
Qty: 30 CAP | Refills: 0 | Status: SHIPPED | OUTPATIENT
Start: 2019-11-17 | End: 2019-12-11

## 2019-11-17 RX ORDER — BECLOMETHASONE DIPROPIONATE HFA 80 UG/1
AEROSOL, METERED RESPIRATORY (INHALATION)
COMMUNITY
Start: 2019-10-13 | End: 2019-12-11

## 2019-11-17 RX ORDER — AZITHROMYCIN 250 MG/1
TABLET, FILM COATED ORAL
Qty: 6 TAB | Refills: 0 | Status: SHIPPED | OUTPATIENT
Start: 2019-11-17 | End: 2019-11-22

## 2019-11-17 ASSESSMENT — ENCOUNTER SYMPTOMS
SHORTNESS OF BREATH: 1
HEADACHES: 1
WHEEZING: 1
NAUSEA: 1
FEVER: 0
COUGH: 1
CARDIOVASCULAR NEGATIVE: 1
FOCAL WEAKNESS: 0
SENSORY CHANGE: 0

## 2019-11-17 NOTE — PROGRESS NOTES
Subjective:     Ila Bangura is a 74 y.o. female who presents for Cough (x4 days. Cough.)       Cough   This is a new problem. Episode onset: 3 days ago. The problem has been gradually worsening. Associated symptoms include headaches, shortness of breath and wheezing. Pertinent negatives include no chest pain or fever. She has tried OTC cough suppressant and a beta-agonist inhaler for the symptoms. The treatment provided no relief. Her past medical history is significant for asthma.     Patient also being evaluated by her  who has been having similar symptoms x4 days.  Patient reports that her symptoms started after going to [facility].  Has not had a flu shot this season.    PMH:  has a past medical history of Asthma, Diabetes (HCC), and GERD (gastroesophageal reflux disease).    MEDS:   Current Outpatient Medications:   •  benzonatate (TESSALON) 200 MG capsule, Take 1 Cap by mouth every 8 hours as needed for Cough., Disp: 30 Cap, Rfl: 0  •  azithromycin (ZITHROMAX) 250 MG Tab, Take 2 tabs by mouth once today, then one tab by mouth once daily days 2-5., Disp: 6 Tab, Rfl: 0  •  atorvastatin (LIPITOR) 10 MG Tab, TAKE 1 TABLET DAILY, Disp: 90 Tab, Rfl: 4  •  omeprazole (PRILOSEC) 20 MG delayed-release capsule, TAKE 1 CAPSULE DAILY, Disp: 90 Cap, Rfl: 3  •  carvedilol (COREG) 6.25 MG Tab, TAKE 1 TABLET TWICE A DAY WITH MEALS, Disp: 180 Tab, Rfl: 3  •  cyanocobalamin (VITAMIN B-12) 500 MCG Tab, Take 500 mcg by mouth every day., Disp: , Rfl:   •  beclomethasone (QVAR) 80 MCG/ACT inhaler, Inhale 1 Puff by mouth 2 times a day., Disp: , Rfl:   •  Multiple Vitamins-Minerals (CENTRUM SILVER ADULT 50+ PO), Take  by mouth., Disp: , Rfl:   •  levalbuterol (XOPENEX HFA) 45 MCG/ACT inhaler, Inhale 1-2 Puffs by mouth every four hours as needed for Shortness of Breath., Disp: , Rfl:   •  QVAR REDIHALER 80 MCG/ACT inhaler, , Disp: , Rfl:   •  fluticasone (FLONASE) 50 MCG/ACT nasal spray, Spray 2 Sprays in nose every day.  (Patient not taking: Reported on 11/17/2019), Disp: 16 g, Rfl: 3    ALLERGIES:   Allergies   Allergen Reactions   • Augmentin Hives     Hard to breath   • Aspirin Nausea     Heart burn, even with omeprazole     SURGHX:   Past Surgical History:   Procedure Laterality Date   • DENTAL SURGERY  2016    bone graft   • COLONOSCOPY      negative   • UPPER OR LOWER GI PROCEDURE WITH ANESTHESIA      x 2 upper GI     SOCHX:  reports that she has never smoked. She has never used smokeless tobacco. She reports that she does not drink alcohol or use drugs.     FH: Reviewed with patient, not pertinent to this visit.    Review of Systems   Constitutional: Positive for malaise/fatigue. Negative for fever.   HENT: Positive for congestion.    Respiratory: Positive for cough, shortness of breath and wheezing.    Cardiovascular: Negative.  Negative for chest pain.   Gastrointestinal: Positive for nausea.   Neurological: Positive for headaches. Negative for sensory change and focal weakness.   All other systems reviewed and are negative.    Objective:     /62   Pulse 80   Temp 36.9 °C (98.4 °F)   Ht 1.524 m (5')   Wt 49.9 kg (110 lb)   LMP  (LMP Unknown)   SpO2 94%   BMI 21.48 kg/m²     Physical Exam  Vitals signs reviewed.   Constitutional:       General: She is not in acute distress.     Appearance: She is well-developed. She is not toxic-appearing or diaphoretic.   HENT:      Head: Normocephalic.      Right Ear: Tympanic membrane and external ear normal.      Left Ear: Tympanic membrane and external ear normal.      Nose: Nose normal.      Mouth/Throat:      Mouth: Mucous membranes are moist.      Pharynx: Oropharynx is clear. Uvula midline.   Eyes:      Extraocular Movements: Extraocular movements intact.      Conjunctiva/sclera: Conjunctivae normal.      Pupils: Pupils are equal, round, and reactive to light.   Neck:      Musculoskeletal: Normal range of motion.   Cardiovascular:      Rate and Rhythm: Normal rate and  regular rhythm.      Pulses: Normal pulses.      Heart sounds: Normal heart sounds.   Pulmonary:      Effort: Pulmonary effort is normal. No respiratory distress.      Breath sounds: Normal breath sounds. No decreased breath sounds.   Abdominal:      General: Bowel sounds are normal.      Palpations: Abdomen is soft.      Tenderness: There is no tenderness.   Musculoskeletal: Normal range of motion.         General: No deformity.   Skin:     General: Skin is warm and dry.      Capillary Refill: Capillary refill takes less than 2 seconds.      Coloration: Skin is not pale.   Neurological:      Mental Status: She is alert and oriented to person, place, and time.      Sensory: No sensory deficit.      Coordination: Coordination normal.   Psychiatric:         Behavior: Behavior normal. Behavior is cooperative.          Assessment/Plan:     1. RTI (respiratory tract infection)  - REFERRAL TO INTERNAL MEDICINE  - azithromycin (ZITHROMAX) 250 MG Tab; Take 2 tabs by mouth once today, then one tab by mouth once daily days 2-5.  Dispense: 6 Tab; Refill: 0    2. Cough  - benzonatate (TESSALON) 200 MG capsule; Take 1 Cap by mouth every 8 hours as needed for Cough.  Dispense: 30 Cap; Refill: 0    Various differentials discussed including allergic vs viral vs bacterial etiologies.    Patient concerned about pneumonia. Rx as above sent electronically.    Discussed close monitoring and supportive measures including increasing fluids and rest as well as OTC symptom management.    Warning signs reviewed. Return precautions discussed.    Patient reports that she normally sees Dr. Galan for primary care.  However, her PCP is leaving the practice next month.  She is requesting a referral for internal medicine.  Referral entered.    Patient advised to: Return for 1) Symptoms don't improve or worsen, or go to ER, 2) Follow up with primary care in 7-10 days.    Differential diagnosis, natural history, supportive care, and indications for  immediate follow-up discussed. All questions answered. Patient agrees with the plan of care.

## 2019-12-02 ENCOUNTER — HOSPITAL ENCOUNTER (OUTPATIENT)
Dept: LAB | Facility: MEDICAL CENTER | Age: 74
End: 2019-12-02
Attending: INTERNAL MEDICINE
Payer: MEDICARE

## 2019-12-02 DIAGNOSIS — E78.5 DYSLIPIDEMIA: ICD-10-CM

## 2019-12-02 DIAGNOSIS — I10 ESSENTIAL HYPERTENSION: ICD-10-CM

## 2019-12-02 DIAGNOSIS — Z11.59 NEED FOR HEPATITIS C SCREENING TEST: ICD-10-CM

## 2019-12-02 DIAGNOSIS — R73.01 IFG (IMPAIRED FASTING GLUCOSE): ICD-10-CM

## 2019-12-02 LAB
ALBUMIN SERPL BCP-MCNC: 4.1 G/DL (ref 3.2–4.9)
ALBUMIN/GLOB SERPL: 1.3 G/DL
ALP SERPL-CCNC: 70 U/L (ref 30–99)
ALT SERPL-CCNC: 13 U/L (ref 2–50)
ANION GAP SERPL CALC-SCNC: 8 MMOL/L (ref 0–11.9)
AST SERPL-CCNC: 16 U/L (ref 12–45)
BILIRUB SERPL-MCNC: 0.6 MG/DL (ref 0.1–1.5)
BUN SERPL-MCNC: 17 MG/DL (ref 8–22)
CALCIUM SERPL-MCNC: 9.3 MG/DL (ref 8.5–10.5)
CHLORIDE SERPL-SCNC: 105 MMOL/L (ref 96–112)
CHOLEST SERPL-MCNC: 166 MG/DL (ref 100–199)
CO2 SERPL-SCNC: 27 MMOL/L (ref 20–33)
CREAT SERPL-MCNC: 0.67 MG/DL (ref 0.5–1.4)
EST. AVERAGE GLUCOSE BLD GHB EST-MCNC: 140 MG/DL
FASTING STATUS PATIENT QL REPORTED: NORMAL
GLOBULIN SER CALC-MCNC: 3.2 G/DL (ref 1.9–3.5)
GLUCOSE SERPL-MCNC: 106 MG/DL (ref 65–99)
HBA1C MFR BLD: 6.5 % (ref 0–5.6)
HCV AB SER QL: NEGATIVE
HDLC SERPL-MCNC: 52 MG/DL
LDLC SERPL CALC-MCNC: 88 MG/DL
POTASSIUM SERPL-SCNC: 4 MMOL/L (ref 3.6–5.5)
PROT SERPL-MCNC: 7.3 G/DL (ref 6–8.2)
SODIUM SERPL-SCNC: 140 MMOL/L (ref 135–145)
TRIGL SERPL-MCNC: 129 MG/DL (ref 0–149)

## 2019-12-02 PROCEDURE — 36415 COLL VENOUS BLD VENIPUNCTURE: CPT

## 2019-12-02 PROCEDURE — 83036 HEMOGLOBIN GLYCOSYLATED A1C: CPT | Mod: GA

## 2019-12-02 PROCEDURE — 86803 HEPATITIS C AB TEST: CPT

## 2019-12-02 PROCEDURE — 80053 COMPREHEN METABOLIC PANEL: CPT

## 2019-12-02 PROCEDURE — 80061 LIPID PANEL: CPT

## 2019-12-07 ENCOUNTER — HOSPITAL ENCOUNTER (OUTPATIENT)
Dept: RADIOLOGY | Facility: MEDICAL CENTER | Age: 74
End: 2019-12-07
Attending: INTERNAL MEDICINE
Payer: MEDICARE

## 2019-12-07 DIAGNOSIS — Z12.31 ENCOUNTER FOR SCREENING MAMMOGRAM FOR MALIGNANT NEOPLASM OF BREAST: ICD-10-CM

## 2019-12-07 PROCEDURE — 77063 BREAST TOMOSYNTHESIS BI: CPT

## 2019-12-11 ENCOUNTER — OFFICE VISIT (OUTPATIENT)
Dept: MEDICAL GROUP | Age: 74
End: 2019-12-11
Payer: MEDICARE

## 2019-12-11 VITALS
DIASTOLIC BLOOD PRESSURE: 72 MMHG | OXYGEN SATURATION: 99 % | BODY MASS INDEX: 21.97 KG/M2 | SYSTOLIC BLOOD PRESSURE: 144 MMHG | HEIGHT: 59 IN | HEART RATE: 62 BPM | TEMPERATURE: 98.1 F | WEIGHT: 109 LBS

## 2019-12-11 DIAGNOSIS — E11.9 TYPE 2 DIABETES MELLITUS WITHOUT COMPLICATION, WITHOUT LONG-TERM CURRENT USE OF INSULIN (HCC): ICD-10-CM

## 2019-12-11 DIAGNOSIS — E78.5 DYSLIPIDEMIA: ICD-10-CM

## 2019-12-11 DIAGNOSIS — N81.4 CYSTOCELE WITH UTERINE PROLAPSE: ICD-10-CM

## 2019-12-11 DIAGNOSIS — Z23 NEED FOR VACCINATION: ICD-10-CM

## 2019-12-11 DIAGNOSIS — I10 ESSENTIAL HYPERTENSION: ICD-10-CM

## 2019-12-11 PROCEDURE — 90662 IIV NO PRSV INCREASED AG IM: CPT | Performed by: INTERNAL MEDICINE

## 2019-12-11 PROCEDURE — 99214 OFFICE O/P EST MOD 30 MIN: CPT | Mod: 25 | Performed by: INTERNAL MEDICINE

## 2019-12-11 PROCEDURE — G0008 ADMIN INFLUENZA VIRUS VAC: HCPCS | Performed by: INTERNAL MEDICINE

## 2019-12-11 RX ORDER — LISINOPRIL 2.5 MG/1
2.5 TABLET ORAL DAILY
Qty: 90 TAB | Refills: 3 | Status: SHIPPED | OUTPATIENT
Start: 2019-12-11 | End: 2020-01-17

## 2019-12-11 RX ORDER — METFORMIN HYDROCHLORIDE 500 MG/1
500 TABLET, EXTENDED RELEASE ORAL DAILY
Qty: 90 TAB | Refills: 3 | Status: SHIPPED | OUTPATIENT
Start: 2019-12-11 | End: 2019-12-11 | Stop reason: SDUPTHER

## 2019-12-11 RX ORDER — ATORVASTATIN CALCIUM 10 MG/1
10 TABLET, FILM COATED ORAL EVERY EVENING
Qty: 90 TAB | Refills: 4 | Status: SHIPPED | OUTPATIENT
Start: 2019-12-11 | End: 2021-02-01 | Stop reason: SDUPTHER

## 2019-12-11 RX ORDER — METFORMIN HYDROCHLORIDE 500 MG/1
500 TABLET, EXTENDED RELEASE ORAL DAILY
Qty: 90 TAB | Refills: 3 | Status: SHIPPED | OUTPATIENT
Start: 2019-12-11 | End: 2020-01-17

## 2019-12-11 RX ORDER — CARVEDILOL 6.25 MG/1
6.25 TABLET ORAL 2 TIMES DAILY WITH MEALS
Qty: 180 TAB | Refills: 4 | Status: SHIPPED | OUTPATIENT
Start: 2019-12-11 | End: 2020-01-17

## 2019-12-11 ASSESSMENT — PAIN SCALES - GENERAL: PAINLEVEL: NO PAIN

## 2019-12-11 NOTE — PROGRESS NOTES
Subjective:   Ila Bangura is a 74 y.o. female here today for evaluation and management of:    Patient has been evaluated by Dr. Hillman, ENT for oral thrush and tongue lesion. She reports symptoms are gradually improving with use of prescribed oral rinse.  Patient reported that ENT did not think her tongue lesion is a cancer lesion, but it is more benign appearance.  She states that she has follow-up appointment with ENT in 2 months.    She additionally reports prominent veins on her lower extremities, however on exam they are not to the point of varicose veins. She is advised on use of compression stockings, avoiding prolonged periods of immobilization, and elevating her lower extremities.    Essential hypertension  Chronic. Patient reports compliancy with carvedilol 6.25 mg BID and denies any associated side effects. BP in clinic today is mildly elevated at 144/72. She reports home systolic BP is typically 105-115. She denies any chest pain, headaches, lightheadedness, or lower extremity edema. Due to new diagnosis of diabetes and elevated BP in clinic today, patient is agreeable to initiation on lisinopril 2.5 mg QD.    Dyslipidemia  Chronic. Patient reports compliancy with atorvastatin 10 mg QN and denies any associated side effects. She follows a strict diet and regularly exercises. Today we reviewed blood work from 12/2/19 which indicated stable lipid panel with tot 166; tri 129; HDL 52; LDL 88. The 10-year ASCVD risk score ([place] DC Jr., et al., 2013) is: 39.7%. She denies any chest pain or claudication.     I reviewed recent blood work with the patient in clinic today.   Ref. Range 5/18/2019 07:39 12/2/2019 06:42   Cholesterol,Tot Latest Ref Range: 100 - 199 mg/dL 149 166   Triglycerides Latest Ref Range: 0 - 149 mg/dL 125 129   HDL Latest Ref Range: >=40 mg/dL 56 52   LDL Latest Ref Range: <100 mg/dL 68 88     Type 2 diabetes mellitus without complication, without long-term current use of insulin  "(HCC)  New onset, previously impaired fasting glucose. Patient is currently attempting to control through diet and exercise alone, no current pharmacological treatment. She denies eating sweets and has greatly restricted her diet. She frequently exercises. She denies any vision changes, peripheral neuropathy, polyuria, polydipsia, or fatigue. Today we reviewed blood work from 12/2/19 which indicated elevated A1c of 6.5. Patient has strong familial history of diabetes in her brother and sister. Today she was agreeable to initiation on metformin  mg QD.    I reviewed recent blood work with the patient in clinic today.   Ref. Range 11/8/2018 06:48 11/14/2018 20:23 5/18/2019 07:39 12/2/2019 06:42   Glucose Latest Ref Range: 65 - 99 mg/dL 108 (H) 106 (H) 127 (H) 106 (H)   Glycohemoglobin Latest Ref Range: 0.0 - 5.6 % 6.4 (H) 6.6 (H) 6.3 (H) 6.5 (H)   Estim. Avg Glu Latest Units: mg/dL 137 143 134 140     Cystocele with uterine prolapse  New onset. Patient reports that 1 month ago while washing herself in the shower she felt a \"lump inside her vagina\". She denies any associated vaginal pain, pressure, or pulling sensation. She reports chronic urinary incontinence when laughing/sneezing. She reports dyspareunia and frequent UTI's after sexual intercourse however she denies any current symptoms including dysuria, hematuria, frequency, or urgency. She denies vaginal dryness/itching. She reports most recent pap smear was 10 years ago and she denies history of abnormal pap. She denies a personal history of cancer. Familial history of breast cancer (sister). She had 3 normal, vaginal births.      Current medicines (including changes today)  Current Outpatient Medications   Medication Sig Dispense Refill   • lisinopril (PRINIVIL) 2.5 MG Tab Take 1 Tab by mouth every day. 90 Tab 3   • metFORMIN ER (GLUCOPHAGE XR) 500 MG TABLET SR 24 HR Take 1 Tab by mouth every day. 90 Tab 3   • atorvastatin (LIPITOR) 10 MG Tab Take 1 Tab " "by mouth every evening. 90 Tab 4   • carvedilol (COREG) 6.25 MG Tab Take 1 Tab by mouth 2 times a day, with meals. 180 Tab 4   • omeprazole (PRILOSEC) 20 MG delayed-release capsule TAKE 1 CAPSULE DAILY 90 Cap 3   • fluticasone (FLONASE) 50 MCG/ACT nasal spray Spray 2 Sprays in nose every day. 16 g 3   • cyanocobalamin (VITAMIN B-12) 500 MCG Tab Take 500 mcg by mouth every day.     • beclomethasone (QVAR) 80 MCG/ACT inhaler Inhale 1 Puff by mouth 2 times a day.     • Multiple Vitamins-Minerals (CENTRUM SILVER ADULT 50+ PO) Take  by mouth.     • levalbuterol (XOPENEX HFA) 45 MCG/ACT inhaler Inhale 1-2 Puffs by mouth every four hours as needed for Shortness of Breath.       No current facility-administered medications for this visit.      She  has a past medical history of Asthma, Diabetes (HCC), and GERD (gastroesophageal reflux disease).    ROS   No chest pain, no shortness of breath, no abdominal pain       Objective:     /72 (BP Location: Left arm, Patient Position: Sitting, BP Cuff Size: Adult)   Pulse 62   Temp 36.7 °C (98.1 °F) (Temporal)   Ht 1.506 m (4' 11.29\")   Wt 49.4 kg (109 lb)   SpO2 99%  Body mass index is 21.8 kg/m².   Physical Exam:  General: Alert, oriented and no acute distress.  Eye contact is good, speech goal directed, affect calm  HEENT: conjunctiva non-injected, sclera non-icteric.  Oral mucous membranes pink and moist with no lesions.  Pinna normal.   Lungs: Normal respiratory effort, clear to auscultation bilaterally with good excursion.  CV: regular rate and rhythm. No murmurs.  Abdomen: soft, non distended, nontender, No CVAT, Bowel sound normal.  Pelvic exam: Thin vaginal wall without lesions or discharge. Uterine prolapse with cystocele.  Ext: no edema, color normal, vascularity normal, temperature normal  Musculoskeletal exam: moving all extremities freely      Assessment and Plan:   The following treatment plan was discussed:    1. Essential hypertension  - Not at goal, BP " elevated at 144/72 in clinic today. I have added on lisinopril due to new diagnosis of diabetes and elevated BP in clinic today. She is advised to continue regimen of carvedilol 6.25 mg BID in addition to initiating lisinopril 2.5 mg QD. I reviewed potential risks, benefits, and side effects of these medications with the patient in clinic today, including hypotension, lightheadedness, and dizziness. Carvedilol refilled today.  - Recommend to monitor blood pressure and heart rate at home. If BP is below 110/60 or she is symptomatic, she is advised to skip dose of lisinopril 2.5 mg QD.  - Discussed to eat low-sodium diet and encouraged to do regular physical exercise.  - Plan to continue to monitor with blood work, which will be ordered and reviewed by her new PCP.  - lisinopril (PRINIVIL) 2.5 MG Tab; Take 1 Tab by mouth every day.  Dispense: 90 Tab; Refill: 3  - carvedilol (COREG) 6.25 MG Tab; Take 1 Tab by mouth 2 times a day, with meals.  Dispense: 180 Tab; Refill: 4    2. Dyslipidemia  - Well-controlled. Continue current regimen, atorvastatin 10 mg QN. I reviewed potential risks, benefits, and side effects of this medication with the patient in clinic today. Medication refilled today.  - Advised to eat low fat, low carbohydrate and high fiber diet as well as do cardio physical exercise regularly.   - Plan to continue to monitor with blood work, which will be ordered and reviewed by her new PCP.  - atorvastatin (LIPITOR) 10 MG Tab; Take 1 Tab by mouth every evening.  Dispense: 90 Tab; Refill: 4    3. Type 2 diabetes mellitus without complication, without long-term current use of insulin (HCC)  - Not at goal, A1c 6.5 on 12/2/19. Patient was previously diagnosed with impaired fasting glucose however due to continued elevated A1c, today is diagnosed with type 2 diabetes. Today she was agreeable to initiation on metformin  mg QD. I reviewed potential risks, benefits, and side effects of this medication with the  patient in clinic today, including abdominal bloating and diarrhea. She is advised to take metformin daily with her largest meal.  - Counseled to comply with medication and diet.   - Counseled signs and symptoms of hypoglycemia and management of hypoglycemia.   - Recommend to have retinal eye exam once a year.  - Advised to check both feet daily.  - Plan to continue to monitor with blood work, which will be ordered and reviewed by her new PCP.  - metFORMIN ER (GLUCOPHAGE XR) 500 MG TABLET SR 24 HR; Take 1 Tab by mouth every day.  Dispense: 90 Tab; Refill: 3    4. Cystocele with uterine prolapse  - Pelvic exam today indicated cystocele with uterine prolapse. I have referred the patient to Gynecology for discussion of treatment options, including pessary or surgical repair. Today patient declines a estrogen hormonal cream such as Premarin cream due to concern of potential side effects and familial history of breast cancer in her sister.  - Patient is advised on frequent Kegel exercises. She is advised on frequent voiding of the bladder.   - REFERRAL TO GYNECOLOGY    5. Need for vaccination  - Patient was agreeable to receiving the influenza vaccine in clinic today after discussion of potential risks, benefits, and side effects. Vaccine was administered without adverse effects.  - INFLUENZA VACCINE, HIGH DOSE (65+ ONLY)    6. Health Maintenance   - Patient is due for AWV which they are encouraged to schedule with their new PCP.  - Patient was advised to inquire about the second shingles vaccine at their local pharmacy due to clinic shortage. I reviewed the potential risks, benefits, and side effects with the patient.    Follow up: Return in about 3 months (around 3/11/2020), or if symptoms worsen or fail to improve, for Hypertension, Hyperlipidemia, Diabetes.    IAmina (Evelia), am scribing for, and in the presence of, Wendy Galan M.D.. Electronically signed by: Amina Marmolejo (Evelia), 12/11/2019. I  Wendy Galan M.D., personally performed the services described in this documentation, as scribed by Amina Marmolejo in my presence, and it is both accurate and complete.    Please note that this dictation was created using voice recognition software. I have made every reasonable attempt to correct obvious errors, but I expect that there may have unintended errors in text, spelling, punctuation, or grammar that I did not discover.

## 2019-12-11 NOTE — LETTER
[facility]  Wendy Galan M.D.  [address]  [place] [address]  Fax: [phone]   Authorization for Release/Disclosure of   Protected Health Information   Name: TERRELL MULLER : 1945 SSN: [id number]   Address: [address] [place] [id number] Phone:    [phone] (home)    I authorize the entity listed below to release/disclose the PHI below to:   [facility]/Wendy Galan M.D. and Wendy Galan M.D.   Provider or Entity Name:  Dr. Michael iHllman   Address   [facility], [facility], [place]   Phone:  [phone]    Fax:     Reason for request: continuity of care   Information to be released:    [  ] LAST COLONOSCOPY,  including any PATH REPORT and follow-up  [  ] LAST FIT/COLOGUARD RESULT [  ] LAST DEXA  [  ] LAST MAMMOGRAM  [  ] LAST PAP  [  ] LAST LABS [  ] RETINA EXAM REPORT  [  ] IMMUNIZATION RECORDS  [XXX] Release all info      [  ] Check here and initial the line next to each item to release ALL health information INCLUDING  _____ Care and treatment for drug and / or alcohol abuse  _____ HIV testing, infection status, or AIDS  _____ Genetic Testing    DATES OF SERVICE OR TIME PERIOD TO BE DISCLOSED: _____________  I understand and acknowledge that:  * This Authorization may be revoked at any time by you in writing, except if your health information has already been used or disclosed.  * Your health information that will be used or disclosed as a result of you signing this authorization could be re-disclosed by the recipient. If this occurs, your re-disclosed health information may no longer be protected by State or Federal laws.  * You may refuse to sign this Authorization. Your refusal will not affect your ability to obtain treatment.  * This Authorization becomes effective upon signing and will  on (date) __________.      If no date is indicated, this Authorization will  one (1) year from the signature date.    Name: Terrell Muller    Signature:   Date:          12/11/2019       PLEASE FAX REQUESTED RECORDS BACK TO: [phone]

## 2020-01-06 ENCOUNTER — GYNECOLOGY VISIT (OUTPATIENT)
Dept: OBGYN | Facility: CLINIC | Age: 75
End: 2020-01-06
Payer: MEDICARE

## 2020-01-06 VITALS — DIASTOLIC BLOOD PRESSURE: 80 MMHG | WEIGHT: 106 LBS | BODY MASS INDEX: 21.2 KG/M2 | SYSTOLIC BLOOD PRESSURE: 171 MMHG

## 2020-01-06 DIAGNOSIS — N95.2 VAGINAL ATROPHY: ICD-10-CM

## 2020-01-06 PROCEDURE — 99204 OFFICE O/P NEW MOD 45 MIN: CPT | Performed by: OBSTETRICS & GYNECOLOGY

## 2020-01-06 NOTE — NON-PROVIDER
Pt here for NP exam   Pt states that her primary told her she had a uterine prolapse. She said it does not bother her, unless she has sex.   Good# [phone]  Pharmacy verified

## 2020-01-06 NOTE — PROGRESS NOTES
GYN Consult    CC/reason for consult: uterine prolapse    HPI: Ila Bangura is a 74 y.o.  with complaints of uterine prolapse. She started noticing a bulge approximately one month ago. States that it doesn't bother her but it does hurt when she has intercourse. Also having recurrent urinary tract infections after intercourse. Denies dysuria or hematuria. Also complaining of bumps by her anus.       ROS:  constitutional: denies fevers, general concerns  CV: denies chest pain, palpitations, edema  Resp: denies shortness of breath, cough  GI: denies abd pain, N/V, diarrhea/constipation, blood in stool  : denies irregular vaginal bleeding, discharge, pain, denies urinary complaints  Neuro: denies hx of migraines w/ aura  Endo: denies significant weight changes, irregular menses, temperature intolerance, denies hotflashes/nightsweats  Heme/lymph: denies easy bleeding/bruising, denies swollen glands  Psych: denies concerns about mood, denies SI  Allergy: denies concerns        GYN History:  Menopause: late 60's.  Menarche @14.  Menses regular, lasting 5 days, not particularly heavy.  No h/o abnormal pap, nor history of cone biopsy, LEEP or any other cervical, uterine or gynecologic surgery. No history of sexually transmitted diseases.       OB history:  - vaginal deliveries full term biggest baby was 10 lbs and 24 inches long  OB History   No obstetric history on file.       Past Medical History:   Diagnosis Date   • Asthma    • Diabetes (HCC)     borderline   • GERD (gastroesophageal reflux disease)     acid reflux       Past Surgical History:   Procedure Laterality Date   • DENTAL SURGERY  2016    bone graft   • COLONOSCOPY      negative   • UPPER OR LOWER GI PROCEDURE WITH ANESTHESIA      x 2 upper GI       Medications:   Current Outpatient Medications Ordered in Epic   Medication Sig Dispense Refill   • estrogens, conjugated (PREMARIN) 0.625 MG/GM Cream Insert / applicatorful vaginally  nightly for two weeks, then 1/4 applicatorful at night twice a week 1 Tube 3   • lisinopril (PRINIVIL) 2.5 MG Tab Take 1 Tab by mouth every day. 90 Tab 3   • metFORMIN ER (GLUCOPHAGE XR) 500 MG TABLET SR 24 HR Take 1 Tab by mouth every day. 90 Tab 3   • atorvastatin (LIPITOR) 10 MG Tab Take 1 Tab by mouth every evening. 90 Tab 4   • carvedilol (COREG) 6.25 MG Tab Take 1 Tab by mouth 2 times a day, with meals. 180 Tab 4   • omeprazole (PRILOSEC) 20 MG delayed-release capsule TAKE 1 CAPSULE DAILY 90 Cap 3   • fluticasone (FLONASE) 50 MCG/ACT nasal spray Spray 2 Sprays in nose every day. 16 g 3   • cyanocobalamin (VITAMIN B-12) 500 MCG Tab Take 500 mcg by mouth every day.     • beclomethasone (QVAR) 80 MCG/ACT inhaler Inhale 1 Puff by mouth 2 times a day.     • Multiple Vitamins-Minerals (CENTRUM SILVER ADULT 50+ PO) Take  by mouth.     • levalbuterol (XOPENEX HFA) 45 MCG/ACT inhaler Inhale 1-2 Puffs by mouth every four hours as needed for Shortness of Breath.       No current [facility]-ordered facility-administered medications on file.        Allergies: Augmentin and Aspirin    Social History     Socioeconomic History   • Marital status:      Spouse name: Not on file   • Number of children: Not on file   • Years of education: Not on file   • Highest education level: Not on file   Occupational History   • Not on file   Social Needs   • Financial resource strain: Not on file   • Food insecurity:     Worry: Not on file     Inability: Not on file   • Transportation needs:     Medical: Not on file     Non-medical: Not on file   Tobacco Use   • Smoking status: Never Smoker   • Smokeless tobacco: Never Used   Substance and Sexual Activity   • Alcohol use: No   • Drug use: No   • Sexual activity: Not Currently     Partners: Male   Lifestyle   • Physical activity:     Days per week: Not on file     Minutes per session: Not on file   • Stress: Not on file   Relationships   • Social connections:     Talks on phone: Not on file      Gets together: Not on file     Attends Mosque service: Not on file     Active member of club or organization: Not on file     Attends meetings of clubs or organizations: Not on file     Relationship status: Not on file   • Intimate partner violence:     Fear of current or ex partner: Not on file     Emotionally abused: Not on file     Physically abused: Not on file     Forced sexual activity: Not on file   Other Topics Concern   • Not on file   Social History Narrative   • Not on file       Family History   Problem Relation Age of Onset   • Other Mother         Intestinal Problem   • Cancer Father         Stomach Cancer   • Lung Disease Father         Smoker   • Cancer Sister         breast cancer/mastectomy   • Diabetes Brother    • Cancer Maternal Grandmother         bone cancer   • No Known Problems Maternal Grandfather    • Diabetes Brother    • Diabetes Sister      Denies hx of GI/GYN/breast cancers    Physical Exam:  BP (!) 171/80   Wt 48.1 kg (106 lb)   LMP  (LMP Unknown)   BMI 21.20 kg/m²   gen: AAO, NAD, affect appropriate  CV: RRR; no LE edema  Resp: Symmetric non labored breathing, CTAB  Abd: soft, NT, ND, no masses, no organomegaly, no hernias  : NEFG, normal urethral meatus, normal anus/perineum, normal vagina and cervix other than atrophy noted (flattened rugae and dry mucosa).  Uterus midline, anteverted, no adnexal masses/tenderness  Skin: warm/dry, no lesions    A/P: 74 y.o.  with  1. Vaginal atrophy  estrogens, conjugated (PREMARIN) 0.625 MG/GM Cream     Discussed option of Pessary as well as surgery. Patient really wants to avoid surgery and would also like to avoid pessary at this time.  She will follow-up in 3 months to discuss of the vaginal estrogen cream has helped with the recurrent urinary tract infections and the pain with intercourse.

## 2020-01-17 ENCOUNTER — OFFICE VISIT (OUTPATIENT)
Dept: MEDICAL GROUP | Age: 75
End: 2020-01-17
Payer: MEDICARE

## 2020-01-17 VITALS
HEART RATE: 67 BPM | HEIGHT: 59 IN | BODY MASS INDEX: 21.57 KG/M2 | SYSTOLIC BLOOD PRESSURE: 124 MMHG | DIASTOLIC BLOOD PRESSURE: 84 MMHG | TEMPERATURE: 97.3 F | OXYGEN SATURATION: 97 % | WEIGHT: 107 LBS

## 2020-01-17 DIAGNOSIS — Z80.0 FHX: COLON CANCER: ICD-10-CM

## 2020-01-17 DIAGNOSIS — J45.30 MILD PERSISTENT ASTHMA WITHOUT COMPLICATION: ICD-10-CM

## 2020-01-17 DIAGNOSIS — Z76.89 ENCOUNTER TO ESTABLISH CARE WITH NEW DOCTOR: Primary | ICD-10-CM

## 2020-01-17 DIAGNOSIS — K21.9 GASTROESOPHAGEAL REFLUX DISEASE WITHOUT ESOPHAGITIS: ICD-10-CM

## 2020-01-17 DIAGNOSIS — E11.9 TYPE 2 DIABETES MELLITUS WITHOUT COMPLICATION, WITHOUT LONG-TERM CURRENT USE OF INSULIN (HCC): ICD-10-CM

## 2020-01-17 DIAGNOSIS — Z12.11 COLON CANCER SCREENING: ICD-10-CM

## 2020-01-17 DIAGNOSIS — N81.4 CYSTOCELE WITH UTERINE PROLAPSE: ICD-10-CM

## 2020-01-17 DIAGNOSIS — N95.2 ATROPHIC VAGINITIS: ICD-10-CM

## 2020-01-17 DIAGNOSIS — I10 ESSENTIAL HYPERTENSION: ICD-10-CM

## 2020-01-17 DIAGNOSIS — E78.5 DYSLIPIDEMIA: ICD-10-CM

## 2020-01-17 DIAGNOSIS — Z23 NEED FOR VACCINATION: ICD-10-CM

## 2020-01-17 DIAGNOSIS — K14.1 GEOGRAPHIC TONGUE: ICD-10-CM

## 2020-01-17 PROBLEM — M54.50 ACUTE BILATERAL LOW BACK PAIN WITHOUT SCIATICA: Status: RESOLVED | Noted: 2017-10-25 | Resolved: 2020-01-17

## 2020-01-17 PROBLEM — M79.645 FINGER PAIN, LEFT: Status: RESOLVED | Noted: 2017-10-25 | Resolved: 2020-01-17

## 2020-01-17 PROBLEM — R10.10 PAIN OF UPPER ABDOMEN: Status: RESOLVED | Noted: 2017-01-11 | Resolved: 2020-01-17

## 2020-01-17 PROBLEM — G44.219 EPISODIC TENSION-TYPE HEADACHE, NOT INTRACTABLE: Status: RESOLVED | Noted: 2018-04-02 | Resolved: 2020-01-17

## 2020-01-17 PROCEDURE — G0010 ADMIN HEPATITIS B VACCINE: HCPCS | Performed by: FAMILY MEDICINE

## 2020-01-17 PROCEDURE — 99215 OFFICE O/P EST HI 40 MIN: CPT | Performed by: FAMILY MEDICINE

## 2020-01-17 PROCEDURE — 90746 HEPB VACCINE 3 DOSE ADULT IM: CPT | Performed by: FAMILY MEDICINE

## 2020-01-17 RX ORDER — LOSARTAN POTASSIUM 25 MG/1
25 TABLET ORAL DAILY
Qty: 90 TAB | Refills: 1 | Status: SHIPPED | OUTPATIENT
Start: 2020-01-17 | End: 2020-06-05 | Stop reason: SDUPTHER

## 2020-01-17 ASSESSMENT — PATIENT HEALTH QUESTIONNAIRE - PHQ9: CLINICAL INTERPRETATION OF PHQ2 SCORE: 0

## 2020-01-17 NOTE — PROGRESS NOTES
CC: establish care     HPI:     Ila Bangura is a 74 y.o. female, new patient to the clinic and would like to establish care. She is accompanied by her  today. She is a prior patient of Dr. Wendy Galan.     She has a prior history of Type 2 diabetes mellitus. She was placed on Metformin 500 mg on her last visit, but she did not start taking it as her A1C was only 6.5 at the time. She would like to avoid taking medications. She would like to discuss obtaining a continuous glucose monitor. She has an appointment for an eye exam. She has not previously had any diabetic education. She is interested in DM ed.     She has a history of hypertension. She was previously taking Carvedilol 6.25 mg BID, and she was recently started on Lisinopril 2.5 mg QD. She reports that she has developed a dry cough since she started taking it. She stopped taking the Lisinopril, and her symptoms resolved. Her blood pressure in the office today is well-controlled at 124/84.    She has a history of a cystocele with uterine prolapse and atrophic vaginitis. She has followed up with Dr. Haines (GYN) who discussed Pessary as well as surgery for her uterine prolapse. Patient is reluctant to try either. She has been doing Kegel exercises. She was prescribed Premarin cream for treatment of her vaginal atrophy.     She uses Qvar daily for her history of mild persistent asthma with adequate control of her symptoms. She did develop thrush which did not resolve after being treated with Nystatin. She was then seen by Dr. Hillman (ENT) for this issue as well as her tongue lesions. This improved with warm salt water gargles. She reports that she lost some of her sense of taste as well, but this has started to return. Dr. Hillman did not believe it was cancerous, and she has an appointment to follow-up. She denies any history of tobacco use.     The patient otherwise has a history of dyslipidemia and GERD. She is compliant with her  medications and denies any side effects from them. She is on Atorvastatin 10 mg for her dyslipidemia. She uses Omeprazole 20 mg for her GERD with good control of her symptoms.     Patient is  with vaginal deliveries. She states that her second son child was 10 lbs at birth. She does not have any prior history of major surgeries. She is currently .  She reports her father has a history of colon cancer.       Current medicines (including changes today)  Current Outpatient Medications   Medication Sig Dispense Refill   • losartan (COZAAR) 25 MG Tab Take 1 Tab by mouth every day. 90 Tab 1   • estrogens, conjugated (PREMARIN) 0.625 MG/GM Cream Insert 1/4 applicatorful vaginally nightly for two weeks, then 1/4 applicatorful at night twice a week 1 Tube 3   • atorvastatin (LIPITOR) 10 MG Tab Take 1 Tab by mouth every evening. 90 Tab 4   • omeprazole (PRILOSEC) 20 MG delayed-release capsule TAKE 1 CAPSULE DAILY 90 Cap 3   • beclomethasone (QVAR) 80 MCG/ACT inhaler Inhale 1 Puff by mouth 2 times a day.     • Multiple Vitamins-Minerals (CENTRUM SILVER ADULT 50+ PO) Take  by mouth.     • levalbuterol (XOPENEX HFA) 45 MCG/ACT inhaler Inhale 1-2 Puffs by mouth every four hours as needed for Shortness of Breath.       No current facility-administered medications for this visit.      She  has a past medical history of Asthma, Diabetes (HCC), and GERD (gastroesophageal reflux disease).  She  has a past surgical history that includes upper or lower gi procedure with anesthesia; colonoscopy; and dental surgery (2016).  Social History     Tobacco Use   • Smoking status: Never Smoker   • Smokeless tobacco: Never Used   Substance Use Topics   • Alcohol use: No   • Drug use: No       Family History   Problem Relation Age of Onset   • Other Mother         Intestinal Problem   • Cancer Father         Stomach Cancer   • Lung Disease Father         Smoker   • Cancer Sister         breast cancer/mastectomy   • Diabetes Brother    •  "Cancer Maternal Grandmother         bone cancer   • No Known Problems Maternal Grandfather    • Diabetes Brother    • Diabetes Sister      Family Status   Relation Name Status   • Mo   at age 81        Intestinal Problems   • Fa   at age 77        Stomach Cancer   • Sis  Alive   • Bro  Alive   • MGMo     • MGFa   at age 108   • Bro  Alive   • Sis  Alive   • PGMo     • PGFa         I personally reviewed patient's problem list, allergies, medications, family hx, social hx with patient and update EPIC.     REVIEW OF SYSTEMS:  CONSTITUTIONAL:  Denies night sweats, fatigue, malaise, lethargy, fever or chills.  RESPIRATORY:  Denies cough, wheeze, hemoptysis, or shortness of breath.  CARDIOVASCULAR:  Denies chest pains, palpitations, pedal edema  GASTROINTESTINAL:  Denies abdominal pain, nausea or vomiting, diarrhea, constipation, hematemesis, hematochezia, melena.  GENITOURINARY:  Denies urinary urgency, frequency, dysuria, or hematuria.  No obstructive symptoms.  Denies unusual discharge.    All other systems reviewed and are negative     Objective:     /84 (BP Location: Right arm, Patient Position: Sitting, BP Cuff Size: Adult)   Pulse 67   Temp 36.3 °C (97.3 °F) (Temporal)   Ht 1.499 m (4' 11\")   Wt 48.5 kg (107 lb)   SpO2 97%  Body mass index is 21.61 kg/m².  Physical Exam:    Constitutional: Awake, alert, in no apparent distress.  Skin: Warm, dry, good turgor, no rashes/jaundice in visible areas.  Eye: PERRL, intact EOM, conjunctiva clear, lids normal.  ENMT: TM and auditory canal wnl, nasal & oral mucosa wnl, lips without lesions, good dentition, oropharynx clear.  - geographic tongue  Neck: Trachea midline, no masses, no thyromegaly. No cervical or supraclavicular lymphadenopathy.  Respiratory: Unlabored respiratory effort, lungs clear to auscultation, no wheezes, no rales.  Cardiovascular: Normal S1, S2, no murmur, no rubs, no gallops, no pedal " edema.  Psych: Alert and oriented x3, affect and mood wnl, intact judgement and insight.     Monofilament testing with a 10 gram force: sensation intact: intact bilaterally  Visual Inspection: Feet without maceration, ulcers, fissures.  Pedal pulses: intact bilaterally     Assessment and Plan:   The following treatment plan was discussed    1. Type 2 diabetes mellitus without complication, without long-term current use of insulin (HCC)  Chronic, controlled with diet, her A1C was 6.5. she is not interested in pharmacotherapy. She is interested in DM ed. She is active and exercises regularly. She denies any visual changes, concerning lesions on her feet, symptoms of polyneuropathy. She has appointment for eye exam.   - Discussed dietary modification, exercise, weight loss  - Annual eye exam  - Regular foot exam  - Follow up in 6 months.   - Comp Metabolic Panel; Future  - HEMOGLOBIN A1C; Future  - MICROALBUMIN CREAT RATIO URINE; Future  - REFERRAL TO DIABETIC EDUCATION Diabetes Self Management Education / Training (DSME/T) and Medical Nutrition Therapy (MNT): Initial Group DSME/MNT as authorized by payor, Follow-Up DSME/MNT as authorized by payor; DSME/T Content: Physical Activity,...  - Diabetic Monofilament Lower Extremity Exam    2. Essential hypertension  Chronic, currently taking Coreg 6.25 mg BID. She was also put on Lisinopril 2.5 mg qd. However, she c/o dry cough with Lisinopril. She stopped Lisinopril and dry cough has resolved. Plans:   - discontinued Coreg   - start losartan (COZAAR) 25 MG Tab; Take 1 Tab by mouth every day.  Dispense: 90 Tab; Refill: 1  - recommended dietary modification, exercise, and weight loss.      3. Dyslipidemia  Chronic, controlled with Lipitor 10 mg qd, no s/e reported, will continue.    - recommended dietary modification, exercise, and weight loss.    - Lipid Profile; Future    4. Cystocele with uterine prolapse  5. Atrophic vaginitis  Chronic, working with [facility]. She was last seen  by Dr. Haines. She declined surgery or pessary. She does endorses intermittent urinary problems with prolapse. I had long discussion with patient about treatment options given urinary difficulty. She will reschedule appointment with OBGYN to discuss management. She takes Premarin cream for atrophic vaginitis, which she tolerates well.   - f/u with [facility]  - cont Premarin.     6. Mild persistent asthma without complication  Chronic, controlled with Qvar and Xopenex, rarely has exacerbation. She denies any respiratory symptoms.   - cont Qvar and Xeopnex as directed  - AAP discussed     7. Gastroesophageal reflux disease without esophagitis  Chronic, controlled with Omeprazole 20 mg qd, unable to wean off due to relapse, no s/e reported, will continue.    -Appropriate counseling regarding GERD discussed with patient. Topic might include: weight loss, avoid loose fitting, elevated the head of the bed, avoid common food triggers (fatty or fried foods, tomato sauce, alcohol, chocolate, mint, garlic, onion, and caffeine), smoking cessation, avoid excessive alcohol consumption. Increased risks of vitamin/mineral deficiency, GI infection, and bone loss associated with PPI was also discussed.      8. Need for vaccination  - Hepatitis B Vaccine Adult IM    9. FHx: colon cancer  Last colonoscopy was in 2015. 6mm polyp was removed from transverse colon. She denies any active GI problems.   - REFERRAL TO GASTROENTEROLOGY    10. 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   - reassurance provided.     Patient was seen for 40 minutes face to face of which greater than 50% of appointment time was spent on counseling and coordination of care regarding the above     Ly JANA Treviño M.D.    Records requested.  Followup: Return in about 6 months (around 7/17/2020) for Multiple issues.    Please note that this dictation was created using voice recognition software  and/or scribes. I have made every reasonable attempt to correct obvious errors, but I expect that there are errors of grammar and possibly content that I did not discover before finalizing the note.     I, Michael Joy (Scribe), am scribing for, and in the presence of, Erin Treviño M.D.    Electronically signed by: Michael Joy (Scribe), 1/17/2020    IErin M.D. personally performed the services described in this documentation, as scribed by Michael Joy in my presence, and it is both accurate and complete.

## 2020-01-19 PROBLEM — K14.1 GEOGRAPHIC TONGUE: Status: ACTIVE | Noted: 2020-01-19

## 2020-01-19 PROBLEM — J30.9 ALLERGIC RHINITIS WITH POSTNASAL DRIP: Status: RESOLVED | Noted: 2019-06-03 | Resolved: 2020-01-19

## 2020-01-19 PROBLEM — R09.82 ALLERGIC RHINITIS WITH POSTNASAL DRIP: Status: RESOLVED | Noted: 2019-06-03 | Resolved: 2020-01-19

## 2020-04-09 ENCOUNTER — TELEMEDICINE (OUTPATIENT)
Dept: MEDICAL GROUP | Age: 75
End: 2020-04-09
Payer: MEDICARE

## 2020-04-09 VITALS
BODY MASS INDEX: 21.17 KG/M2 | WEIGHT: 105 LBS | TEMPERATURE: 97.1 F | SYSTOLIC BLOOD PRESSURE: 110 MMHG | DIASTOLIC BLOOD PRESSURE: 69 MMHG | HEIGHT: 59 IN

## 2020-04-09 DIAGNOSIS — E78.5 DYSLIPIDEMIA: ICD-10-CM

## 2020-04-09 DIAGNOSIS — I10 ESSENTIAL HYPERTENSION: ICD-10-CM

## 2020-04-09 DIAGNOSIS — J39.2 THROAT IRRITATION: Primary | ICD-10-CM

## 2020-04-09 DIAGNOSIS — J45.30 MILD PERSISTENT ASTHMA WITHOUT COMPLICATION: ICD-10-CM

## 2020-04-09 PROCEDURE — 99214 OFFICE O/P EST MOD 30 MIN: CPT | Mod: 95 | Performed by: FAMILY MEDICINE

## 2020-04-09 ASSESSMENT — FIBROSIS 4 INDEX: FIB4 SCORE: 1.38

## 2020-04-09 NOTE — PROGRESS NOTES
Telemedicine Visit: Established Patient     This encounter was conducted via [employer].   Verbal consent was obtained. Patient's identity was verified.    Subjective:   CC: throat irritation   Ila Bangura is a 75 y.o. female presenting for evaluation and management of:    Patient has history of mild persistent asthma.  She previously taking Qvar 40 mcg twice daily and Xopenex as needed.  However, patient complains of intermittent throat irritation as well as development of thrush.  Her allergist recommended Qvar 80 mcg once daily.  However, throat irritation has not improved.  Patient states that she has been taking Qvar for a number of years.  She denies fever, chills, cough, nasal discharge, ear pain, headaches, history of recent travel.    Patient is essential hypertension, currently taking losartan 25 mg daily.  She tolerated medication well, no side effect patient is active and tries to exercise regularly couple times per week.    Patient takes Lipitor 10 mg daily for hyperlipidemia.  She tolerated medication well, no side effect reported.  Patient is due to have repeat blood test in a few weeks.    ROS   Denies any recent fevers or chills. No nausea or vomiting. No chest pains or shortness of breath.     Allergies   Allergen Reactions   • Augmentin Hives     Hard to breath   • Aspirin Nausea     Heart burn, even with omeprazole       Current medicines (including changes today)  Current Outpatient Medications   Medication Sig Dispense Refill   • losartan (COZAAR) 25 MG Tab Take 1 Tab by mouth every day. 90 Tab 1   • atorvastatin (LIPITOR) 10 MG Tab Take 1 Tab by mouth every evening. 90 Tab 4   • omeprazole (PRILOSEC) 20 MG delayed-release capsule TAKE 1 CAPSULE DAILY 90 Cap 3   • beclomethasone (QVAR) 80 MCG/ACT inhaler Inhale 1 Puff by mouth 2 times a day.     • Multiple Vitamins-Minerals (CENTRUM SILVER ADULT 50+ PO) Take  by mouth.     • levalbuterol (XOPENEX HFA) 45 MCG/ACT inhaler Inhale 1-2 Puffs  "by mouth every four hours as needed for Shortness of Breath.     • estrogens, conjugated (PREMARIN) 0.625 MG/GM Cream Insert 1/4 applicatorful vaginally nightly for two weeks, then 1/4 applicatorful at night twice a week (Patient not taking: Reported on 4/9/2020) 1 Tube 3     No current facility-administered medications for this visit.        Patient Active Problem List    Diagnosis Date Noted   • Geographic tongue 01/19/2020   • Atrophic vaginitis 01/17/2020   • FHx: colon cancer 01/17/2020   • Cystocele with uterine prolapse_OBGYN 12/11/2019   • Decreased hearing of both ears 09/17/2019   • Type 2 diabetes mellitus without complication, without long-term current use of insulin_diet control 10/10/2016   • Essential hypertension 10/09/2016   • Dyslipidemia 10/09/2016   • Mild persistent asthma without complication 10/09/2016   • Gastroesophageal reflux disease without esophagitis 10/09/2016       Family History   Problem Relation Age of Onset   • Other Mother         Intestinal Problem   • Cancer Father         Stomach Cancer   • Lung Disease Father         Smoker   • Cancer Sister         breast cancer/mastectomy   • Diabetes Brother    • Cancer Maternal Grandmother         bone cancer   • No Known Problems Maternal Grandfather    • Diabetes Brother    • Diabetes Sister        She  has a past medical history of Asthma, Diabetes (HCC), and GERD (gastroesophageal reflux disease).  She  has a past surgical history that includes upper or lower gi procedure with anesthesia; colonoscopy; and dental surgery (2016).       Objective:   Vitals obtained by patient:  /69 (BP Location: Left arm, Patient Position: Sitting, BP Cuff Size: Adult) Comment: pt stated  Temp 36.2 °C (97.1 °F) (Oral) Comment: pt stated  Ht 1.499 m (4' 11\")   Wt 47.6 kg (105 lb) Comment: pt stated  LMP  (LMP Unknown)   BMI 21.21 kg/m²      Physical Exam:  Constitutional: Alert, no distress, well-groomed.  Skin: No rashes in visible " areas.  Eye: Round. Conjunctiva clear, lids normal. No icterus.   ENMT: Lips pink without lesions, good dentition, moist mucous membranes. Phonation normal.  Neck: No masses, no thyromegaly. Moves freely without pain.  Respiratory: Unlabored respiratory effort, no cough or audible wheeze  Psych: Alert and oriented x3, normal affect and mood.       Assessment and Plan:   The following treatment plan was discussed:     1. Mild persistent asthma without complication  2. Throat irritation  Chronic, controlled with Qvar 80 mcg daily and Xopenex PRN as recommended by her allergist.  Patient states that her asthma is under control.  However, she complains of intermittent throat irritation and development of thrush.  She currently does not have thrush, but has been suffering mild sore throat over the past 4 weeks.  She denies any fever chills or any symptoms of upper/lower respiratory tract infection.  Patient has been using Qvar for a number of years.  - I discussed the importance of proper technique when using inhaler to ensure proper drug delivery and avoid side effects such as throat irritation. I recommended that she reviewed a video on [employer] to learn proper technique. She will email me in couple weeks regarding her symptoms.   -Continue Qvar and Xopenex as needed.  Asthma is under excellent control currently has exacerbation.    3. Essential hypertension  Chronic, controlled with losartan 25 mg daily, no side effect reported, will continue.  Patient is active and exercise regularly.    4. Dyslipidemia  Chronic, controlled with Lipitor 10 mg daily, no side effect reported, will continue.    Follow-up: Return in about 6 months (around 10/9/2020) for Multiple issues.

## 2020-04-15 ENCOUNTER — HOSPITAL ENCOUNTER (OUTPATIENT)
Dept: LAB | Facility: MEDICAL CENTER | Age: 75
End: 2020-04-15
Attending: FAMILY MEDICINE
Payer: MEDICARE

## 2020-04-15 DIAGNOSIS — E78.5 DYSLIPIDEMIA: ICD-10-CM

## 2020-04-15 DIAGNOSIS — E11.9 TYPE 2 DIABETES MELLITUS WITHOUT COMPLICATION, WITHOUT LONG-TERM CURRENT USE OF INSULIN (HCC): ICD-10-CM

## 2020-04-15 LAB
ALBUMIN SERPL BCP-MCNC: 4.7 G/DL (ref 3.2–4.9)
ALBUMIN/GLOB SERPL: 1.5 G/DL
ALP SERPL-CCNC: 89 U/L (ref 30–99)
ALT SERPL-CCNC: 19 U/L (ref 2–50)
ANION GAP SERPL CALC-SCNC: 13 MMOL/L (ref 7–16)
AST SERPL-CCNC: 23 U/L (ref 12–45)
BILIRUB SERPL-MCNC: 0.6 MG/DL (ref 0.1–1.5)
BUN SERPL-MCNC: 15 MG/DL (ref 8–22)
CALCIUM SERPL-MCNC: 9.8 MG/DL (ref 8.5–10.5)
CHLORIDE SERPL-SCNC: 100 MMOL/L (ref 96–112)
CHOLEST SERPL-MCNC: 177 MG/DL (ref 100–199)
CO2 SERPL-SCNC: 27 MMOL/L (ref 20–33)
CREAT SERPL-MCNC: 0.51 MG/DL (ref 0.5–1.4)
CREAT UR-MCNC: 98.72 MG/DL
FASTING STATUS PATIENT QL REPORTED: NORMAL
GLOBULIN SER CALC-MCNC: 3.1 G/DL (ref 1.9–3.5)
GLUCOSE SERPL-MCNC: 94 MG/DL (ref 65–99)
HDLC SERPL-MCNC: 67 MG/DL
LDLC SERPL CALC-MCNC: 86 MG/DL
MICROALBUMIN UR-MCNC: 1.4 MG/DL
MICROALBUMIN/CREAT UR: 14 MG/G (ref 0–30)
POTASSIUM SERPL-SCNC: 3.8 MMOL/L (ref 3.6–5.5)
PROT SERPL-MCNC: 7.8 G/DL (ref 6–8.2)
SODIUM SERPL-SCNC: 140 MMOL/L (ref 135–145)
TRIGL SERPL-MCNC: 122 MG/DL (ref 0–149)

## 2020-04-15 PROCEDURE — 80053 COMPREHEN METABOLIC PANEL: CPT

## 2020-04-15 PROCEDURE — 82043 UR ALBUMIN QUANTITATIVE: CPT

## 2020-04-15 PROCEDURE — 82570 ASSAY OF URINE CREATININE: CPT

## 2020-04-15 PROCEDURE — 83036 HEMOGLOBIN GLYCOSYLATED A1C: CPT | Mod: GA

## 2020-04-15 PROCEDURE — 80061 LIPID PANEL: CPT

## 2020-04-15 PROCEDURE — 36415 COLL VENOUS BLD VENIPUNCTURE: CPT

## 2020-04-16 LAB
EST. AVERAGE GLUCOSE BLD GHB EST-MCNC: 131 MG/DL
HBA1C MFR BLD: 6.2 % (ref 0–5.6)

## 2020-05-04 ENCOUNTER — TELEMEDICINE (OUTPATIENT)
Dept: MEDICAL GROUP | Age: 75
End: 2020-05-04
Payer: MEDICARE

## 2020-05-04 VITALS
WEIGHT: 105 LBS | SYSTOLIC BLOOD PRESSURE: 110 MMHG | BODY MASS INDEX: 21.17 KG/M2 | HEIGHT: 59 IN | DIASTOLIC BLOOD PRESSURE: 79 MMHG

## 2020-05-04 DIAGNOSIS — J45.30 MILD PERSISTENT ASTHMA WITHOUT COMPLICATION: ICD-10-CM

## 2020-05-04 DIAGNOSIS — N81.4 CYSTOCELE WITH UTERINE PROLAPSE: ICD-10-CM

## 2020-05-04 DIAGNOSIS — R30.0 DYSURIA: ICD-10-CM

## 2020-05-04 DIAGNOSIS — E11.9 TYPE 2 DIABETES MELLITUS WITHOUT COMPLICATION, WITHOUT LONG-TERM CURRENT USE OF INSULIN (HCC): Primary | ICD-10-CM

## 2020-05-04 PROCEDURE — 99214 OFFICE O/P EST MOD 30 MIN: CPT | Mod: 95,CR | Performed by: FAMILY MEDICINE

## 2020-05-04 RX ORDER — SULFAMETHOXAZOLE AND TRIMETHOPRIM 800; 160 MG/1; MG/1
1 TABLET ORAL EVERY 12 HOURS
Qty: 6 TAB | Refills: 0 | Status: SHIPPED | OUTPATIENT
Start: 2020-05-04 | End: 2020-05-07

## 2020-05-04 RX ORDER — FEXOFENADINE HCL 180 MG/1
180 TABLET ORAL DAILY
Qty: 90 TAB | Refills: 0 | Status: SHIPPED
Start: 2020-05-04 | End: 2020-05-04

## 2020-05-04 ASSESSMENT — FIBROSIS 4 INDEX: FIB4 SCORE: 1.64

## 2020-05-04 NOTE — PROGRESS NOTES
Telemedicine Visit: Established Patient     This encounter was conducted via [employer].   Verbal consent was obtained. Patient's identity was verified.    Subjective:   CC: DM f/u   Ila Bangura is a 75 y.o. female presenting for evaluation and management of:    Pt c/o acute dysuria with urinary frequency onset 24 hours ago. She has been drinking lots of fluid yesterday. Today, she states that her symptoms are improving. She denies fever, chills, flank pain, hematuria.     She was recently diagnosed with type 2 DM. Her blood sugar was 6.5 in 12/2020. She has been working on dietary modification and lost a few lbs. She exercises regularly. Her A1C drops to 6.2 in 4/2020. She currently does not take any medications. She denies any visual changes, concerning lesions on her feet, symptoms of polyneuropathy.     She has hx of mild intermittent asthma. She previously took Qvar 40 mcg BID and Xopenex PRN for acute flare.  She states that her symptoms were controlled with Qvar 40 mcg BID. She is working with local allergist who did in-office PFT and increases QVar dose to 80 mcg BID.  However, she c/o tongue/throat soreness since taking higher dose of Qvar. There is not symptoms changes with higher dose of Qvar. She denies any respiratory symptoms today.     ROS   Denies any recent fevers or chills. No nausea or vomiting. No chest pains or shortness of breath.     Allergies   Allergen Reactions   • Augmentin Hives     Hard to breath   • Aspirin Nausea     Heart burn, even with omeprazole       Current medicines (including changes today)  Current Outpatient Medications   Medication Sig Dispense Refill   • sulfamethoxazole-trimethoprim (BACTRIM DS) 800-160 MG tablet Take 1 Tab by mouth every 12 hours for 3 days. 6 Tab 0   • beclomethasone (QVAR) 40 MCG/ACT inhaler Inhale 2 Puffs by mouth 2 Times a Day. 1 Inhaler 5   • losartan (COZAAR) 25 MG Tab Take 1 Tab by mouth every day. 90 Tab 1   • atorvastatin (LIPITOR) 10 MG  "Tab Take 1 Tab by mouth every evening. 90 Tab 4   • omeprazole (PRILOSEC) 20 MG delayed-release capsule TAKE 1 CAPSULE DAILY 90 Cap 3   • Multiple Vitamins-Minerals (CENTRUM SILVER ADULT 50+ PO) Take  by mouth.     • levalbuterol (XOPENEX HFA) 45 MCG/ACT inhaler Inhale 1-2 Puffs by mouth every four hours as needed for Shortness of Breath.       No current facility-administered medications for this visit.        Patient Active Problem List    Diagnosis Date Noted   • Geographic tongue 01/19/2020   • Atrophic vaginitis 01/17/2020   • FHx: colon cancer 01/17/2020   • Cystocele with uterine prolapse_OBGYN 12/11/2019   • Decreased hearing of both ears 09/17/2019   • Type 2 diabetes mellitus without complication, without long-term current use of insulin_diet control 10/10/2016   • Essential hypertension 10/09/2016   • Dyslipidemia 10/09/2016   • Mild persistent asthma without complication 10/09/2016   • Gastroesophageal reflux disease without esophagitis 10/09/2016       Family History   Problem Relation Age of Onset   • Other Mother         Intestinal Problem   • Cancer Father         Stomach Cancer   • Lung Disease Father         Smoker   • Cancer Sister         breast cancer/mastectomy   • Diabetes Brother    • Cancer Maternal Grandmother         bone cancer   • No Known Problems Maternal Grandfather    • Diabetes Brother    • Diabetes Sister        She  has a past medical history of Asthma, Diabetes (HCC), and GERD (gastroesophageal reflux disease).  She  has a past surgical history that includes upper or lower gi procedure with anesthesia; colonoscopy; and dental surgery (2016).       Objective:   Vitals obtained by patient:  /79   Ht 1.499 m (4' 11\")   Wt 47.6 kg (105 lb) Comment: pt stated  LMP  (LMP Unknown)   BMI 21.21 kg/m²      Physical Exam:  Constitutional: Alert, no distress, well-groomed.  Skin: No rashes in visible areas.  Eye: Round. Conjunctiva clear, lids normal. No icterus.   ENMT: Lips pink " without lesions, good dentition, moist mucous membranes. Phonation normal.  Neck: No masses, no thyromegaly. Moves freely without pain.  Respiratory: Unlabored respiratory effort, no cough or audible wheeze  Psych: Alert and oriented x3, normal affect and mood.       Assessment and Plan:   The following treatment plan was discussed:     1. Dysuria  Acute dysuria and urinary frequency w/o fever, chills, flank pain, hematuria, onset since yesterday. Her symptoms are improving with increasing fluid intake. She states that she is feeling better today. She was advised on adequate hydration. Rx for Bactrim sent to pharmacy in the event symptoms return.   - sulfamethoxazole-trimethoprim (BACTRIM DS) 800-160 MG tablet; Take 1 Tab by mouth every 12 hours for 3 days.  Dispense: 6 Tab; Refill: 0    2. Type 2 diabetes mellitus without complication, without long-term current use of insulin_diet control  New diagnosis, A1C drops from 6.5 in 12/2020 to 6.2 in 4/2020 with diet and lifestyle changes.   - will cont to manage DM with diet & lifestyle changes  - Discussed dietary modification, exercise, weight loss  - Annual eye exam  - Regular foot exam  - Follow up in 3 months.   - HEMOGLOBIN A1C; Future    3. Mild persistent asthma without complication  Chronic, has always been controlled with Qvar 40 mcg BID and Xopenex PRN. Qvar was recently increased to 80 mcg BID based on in-office PFT tests done by her allergist. Unfortunately, higher dose of Qvar gives her tongue/throat soreness w/o any changes in symptoms despite learning proper techniques to deliver the medications as discussed during previous visit. She states that she was doing well on Qvar 40 mcg BID. She wishes to return to lower dose of Qvar. No respiratory concerns today. Plans:   - beclomethasone (QVAR) 40 MCG/ACT inhaler; Inhale 2 Puffs by mouth 2 Times a Day.  Dispense: 1 Inhaler; Refill: 5   - cont Xopenex PRN for acute symptoms.       Follow-up: Return in about 3  months (around 8/4/2020) for Multiple issues.

## 2020-06-05 DIAGNOSIS — I10 ESSENTIAL HYPERTENSION: ICD-10-CM

## 2020-06-05 RX ORDER — LOSARTAN POTASSIUM 25 MG/1
25 TABLET ORAL DAILY
Qty: 90 TAB | Refills: 1 | Status: SHIPPED | OUTPATIENT
Start: 2020-06-05 | End: 2020-06-12 | Stop reason: SDUPTHER

## 2020-06-12 DIAGNOSIS — I10 ESSENTIAL HYPERTENSION: ICD-10-CM

## 2020-06-12 RX ORDER — LOSARTAN POTASSIUM 25 MG/1
25 TABLET ORAL DAILY
Qty: 90 TAB | Refills: 1 | Status: SHIPPED | OUTPATIENT
Start: 2020-06-12 | End: 2020-08-04 | Stop reason: SDUPTHER

## 2020-06-12 NOTE — TELEPHONE ENCOUNTER
Received request via: Patient    Was the patient seen in the last year in this department? Yes    Does the patient have an active prescription (recently filled or refills available) for medication(s) requested? yes but would like sent to new pharmacy

## 2020-07-23 ENCOUNTER — HOSPITAL ENCOUNTER (OUTPATIENT)
Dept: LAB | Facility: MEDICAL CENTER | Age: 75
End: 2020-07-23
Attending: FAMILY MEDICINE
Payer: MEDICARE

## 2020-07-23 DIAGNOSIS — E11.9 TYPE 2 DIABETES MELLITUS WITHOUT COMPLICATION, WITHOUT LONG-TERM CURRENT USE OF INSULIN (HCC): ICD-10-CM

## 2020-07-23 PROCEDURE — 83036 HEMOGLOBIN GLYCOSYLATED A1C: CPT | Mod: GA

## 2020-07-23 PROCEDURE — 36415 COLL VENOUS BLD VENIPUNCTURE: CPT | Mod: GA

## 2020-07-24 LAB
EST. AVERAGE GLUCOSE BLD GHB EST-MCNC: 123 MG/DL
HBA1C MFR BLD: 5.9 % (ref 0–5.6)

## 2020-08-04 ENCOUNTER — TELEMEDICINE (OUTPATIENT)
Dept: MEDICAL GROUP | Age: 75
End: 2020-08-04
Payer: MEDICARE

## 2020-08-04 VITALS
WEIGHT: 105 LBS | DIASTOLIC BLOOD PRESSURE: 68 MMHG | SYSTOLIC BLOOD PRESSURE: 117 MMHG | TEMPERATURE: 97.8 F | HEIGHT: 59 IN | BODY MASS INDEX: 21.17 KG/M2

## 2020-08-04 DIAGNOSIS — I10 ESSENTIAL HYPERTENSION: Primary | ICD-10-CM

## 2020-08-04 DIAGNOSIS — E78.5 DYSLIPIDEMIA: ICD-10-CM

## 2020-08-04 DIAGNOSIS — E11.9 TYPE 2 DIABETES MELLITUS WITHOUT COMPLICATION, WITHOUT LONG-TERM CURRENT USE OF INSULIN (HCC): ICD-10-CM

## 2020-08-04 PROCEDURE — 99214 OFFICE O/P EST MOD 30 MIN: CPT | Mod: 95,CR | Performed by: FAMILY MEDICINE

## 2020-08-04 RX ORDER — LOSARTAN POTASSIUM 25 MG/1
25 TABLET ORAL DAILY
Qty: 90 TAB | Refills: 3 | Status: SHIPPED | OUTPATIENT
Start: 2020-08-04 | End: 2020-08-14 | Stop reason: SDUPTHER

## 2020-08-04 ASSESSMENT — FIBROSIS 4 INDEX: FIB4 SCORE: 1.64

## 2020-08-05 NOTE — PROGRESS NOTES
Subjective:   CC: Diabetes follow-up    HPI:     Ila Bangura is a 75 y.o. female, established patient of the clinic, presents with the following concerns:     Patient has history of type 2 diabetes, currently diet controlled.  Her most recent A1c was 5.9.  She continue to work on dietary and lifestyle changes to improve this condition.  She denies any visual changes, concerning lesion on her feet, symptoms of polyneuropathy.  Patient is due for retinal eye exam.  However, due to COVID-19, patient will delay disorder test until later.    Patient also has history of essential hypertension and hyperlipidemia.  She is taking losartan 25 mg and Lipitor 10 mg daily.  Both conditions are under good control.    Current medicines (including changes today)  Current Outpatient Medications   Medication Sig Dispense Refill   • losartan (COZAAR) 25 MG Tab Take 1 Tab by mouth every day. 90 Tab 3   • beclomethasone (QVAR) 40 MCG/ACT inhaler Inhale 2 Puffs by mouth 2 Times a Day. 1 Inhaler 5   • atorvastatin (LIPITOR) 10 MG Tab Take 1 Tab by mouth every evening. 90 Tab 4   • Multiple Vitamins-Minerals (CENTRUM SILVER ADULT 50+ PO) Take  by mouth.     • levalbuterol (XOPENEX HFA) 45 MCG/ACT inhaler Inhale 1-2 Puffs by mouth every four hours as needed for Shortness of Breath.       No current facility-administered medications for this visit.      She  has a past medical history of Asthma, Diabetes (HCC), and GERD (gastroesophageal reflux disease).    I personally reviewed patient's problem list, allergies, medications, family hx, social hx with patient and update EPIC.     REVIEW OF SYSTEMS:  CONSTITUTIONAL:  Denies night sweats, fatigue, malaise, lethargy, fever or chills.  RESPIRATORY:  Denies cough, wheeze, hemoptysis, or shortness of breath.  CARDIOVASCULAR:  Denies chest pains, palpitations, pedal edema     Objective:     /68 (BP Location: Left arm, Patient Position: Sitting, BP Cuff Size: Adult)   Temp 36.6 °C  "(97.8 °F) (Temporal)   Ht 1.499 m (4' 11\")   Wt 47.6 kg (105 lb)  Body mass index is 21.21 kg/m².    Physical Exam:  Constitutional: awake, alert, in no distress.  Skin: Warm, dry, good turgor, no rashes, bruises, ulcers in visible areas.  Eye: conjunctiva clear, lids neg for edema or lesions.  Neck: Trachea midline, no masses, no thyromegaly. No cervical or supraclavicular lymphadenopathy  Respiratory: Unlabored respiratory effort, lungs clear to auscultation, no wheezes, no rales.  Cardiovascular: Normal S1, S2, no murmur, no pedal edema.  Psych: Oriented x3, affect and mood wnl, intact judgement and insight.       Assessment and Plan:   The following treatment plan was discussed    1. Essential hypertension  Chronic, controlled with losartan 25 mg daily, no s/e reported, will continue.    - losartan (COZAAR) 25 MG Tab; Take 1 Tab by mouth every day.  Dispense: 90 Tab; Refill: 3  - Pt was counseled on lifestyle modification       2. Type 2 diabetes mellitus without complication, without long-term current use of insulin_diet control  Chronic, diet controlled, most recent A1c was 5.9.  - Discussed dietary modification, exercise, weight loss  - Annual eye exam  - Regular foot exam  - Follow up in 6 months.   - CBC WITH DIFFERENTIAL; Future  - Comp Metabolic Panel; Future  - HEMOGLOBIN A1C; Future  - MICROALBUMIN CREAT RATIO URINE; Future    3. Dyslipidemia  Chronic, controlled with Lipitor 10 mg daily, no s/e reported, will continue.    - Lipid Profile; Future  - recommended dietary modification, exercise, and weight loss.   - avoid alcohol, drugs, tobacco products       Erin Treviño M.D.      Followup: Return in about 6 months (around 2/4/2021) for Annual wellness visit.    Please note that this dictation was created using voice recognition software. I have made every reasonable attempt to correct obvious errors, but I expect that there are errors of grammar and possibly content that I did not discover before " finalizing the note.

## 2020-08-14 ENCOUNTER — PATIENT MESSAGE (OUTPATIENT)
Dept: MEDICAL GROUP | Age: 75
End: 2020-08-14

## 2020-08-14 DIAGNOSIS — I10 ESSENTIAL HYPERTENSION: ICD-10-CM

## 2020-08-17 RX ORDER — LOSARTAN POTASSIUM 25 MG/1
25 TABLET ORAL DAILY
Qty: 90 TAB | Refills: 3 | Status: SHIPPED | OUTPATIENT
Start: 2020-08-17 | End: 2021-10-14 | Stop reason: SDUPTHER

## 2020-10-05 ENCOUNTER — NON-PROVIDER VISIT (OUTPATIENT)
Dept: MEDICAL GROUP | Age: 75
End: 2020-10-05
Payer: MEDICARE

## 2020-10-05 DIAGNOSIS — Z23 NEED FOR VACCINATION: ICD-10-CM

## 2020-10-05 PROCEDURE — 90662 IIV NO PRSV INCREASED AG IM: CPT | Performed by: FAMILY MEDICINE

## 2020-10-05 PROCEDURE — G0008 ADMIN INFLUENZA VIRUS VAC: HCPCS | Performed by: FAMILY MEDICINE

## 2020-10-05 NOTE — PROGRESS NOTES
"Ila Bangura is a 75 y.o. female here for a non-provider visit for:   FLU    Reason for immunization: Annual Flu Vaccine  Immunization records indicate need for vaccine: Yes, confirmed with Epic  Minimum interval has been met for this vaccine: Yes  ABN completed: Yes    Order and dose verified by: HUI  VIS Dated  8-15-19 was given to patient: Yes  All IAC Questionnaire questions were answered \"No.\"    Patient tolerated injection and no adverse effects were observed or reported: Yes    Pt scheduled for next dose in series: No    " Patient still sleeping at this time.

## 2020-11-10 DIAGNOSIS — J45.30 MILD PERSISTENT ASTHMA WITHOUT COMPLICATION: ICD-10-CM

## 2020-11-10 RX ORDER — BECLOMETHASONE DIPROPIONATE HFA 40 UG/1
AEROSOL, METERED RESPIRATORY (INHALATION)
Qty: 10.6 G | Refills: 11 | Status: SHIPPED | OUTPATIENT
Start: 2020-11-10 | End: 2022-01-28

## 2020-12-17 ENCOUNTER — HOSPITAL ENCOUNTER (OUTPATIENT)
Dept: RADIOLOGY | Facility: MEDICAL CENTER | Age: 75
End: 2020-12-17
Attending: FAMILY MEDICINE
Payer: MEDICARE

## 2020-12-17 DIAGNOSIS — Z12.31 VISIT FOR SCREENING MAMMOGRAM: ICD-10-CM

## 2020-12-17 PROCEDURE — 77067 SCR MAMMO BI INCL CAD: CPT

## 2021-01-11 ENCOUNTER — OFFICE VISIT (OUTPATIENT)
Dept: URGENT CARE | Facility: PHYSICIAN GROUP | Age: 76
End: 2021-01-11
Payer: MEDICARE

## 2021-01-11 VITALS
RESPIRATION RATE: 16 BRPM | SYSTOLIC BLOOD PRESSURE: 120 MMHG | TEMPERATURE: 99.6 F | HEART RATE: 60 BPM | HEIGHT: 60 IN | OXYGEN SATURATION: 97 % | WEIGHT: 100 LBS | BODY MASS INDEX: 19.63 KG/M2 | DIASTOLIC BLOOD PRESSURE: 70 MMHG

## 2021-01-11 DIAGNOSIS — Z23 NEED FOR VACCINATION: ICD-10-CM

## 2021-01-11 DIAGNOSIS — R19.7 DIARRHEA OF PRESUMED INFECTIOUS ORIGIN: ICD-10-CM

## 2021-01-11 DIAGNOSIS — K52.9 COLITIS: ICD-10-CM

## 2021-01-11 PROCEDURE — 99214 OFFICE O/P EST MOD 30 MIN: CPT | Performed by: STUDENT IN AN ORGANIZED HEALTH CARE EDUCATION/TRAINING PROGRAM

## 2021-01-11 RX ORDER — SULFAMETHOXAZOLE AND TRIMETHOPRIM 800; 160 MG/1; MG/1
1 TABLET ORAL 2 TIMES DAILY
Qty: 10 TAB | Refills: 0 | Status: SHIPPED | OUTPATIENT
Start: 2021-01-11 | End: 2021-01-16

## 2021-01-11 ASSESSMENT — FIBROSIS 4 INDEX: FIB4 SCORE: 1.64

## 2021-01-12 NOTE — PROGRESS NOTES
Subjective:   CHIEF COMPLAINT  Chief Complaint   Patient presents with   • Diarrhea     diarrhea x4 days.  and pt ate chicken 4 days and both became very ill. pt is still not having normal stools       HPI  Ila Bangura is a 75 y.o. female who presents with a chief complaint of diarrhea for approximately 4 days.  The patient says she developed diarrhea after eating some chicken that she purchased from [email or web address].  She is accompanied by her  who reports he ate the same chicken and experienced mild diarrhea for approximately 1 day.  The patient says she has had persistent diarrhea since onset; reports approximately 7 BMs today.  Stools are watery with an excessively foul odor.  No blood.  Symptoms are precipitated by food.  She is not experiencing any abdominal pain.  No nausea or vomiting.  Patient has had a colonoscopy in the past (approximately 4 years ago) but cannot state whether or not she has a history of diverticulosis/diverticulitis.    REVIEW OF SYSTEMS  General: no fever or chills  GI: no nausea or vomiting  See HPI for further details.    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   • Atrophic vaginitis 01/17/2020   • FHx: colon cancer 01/17/2020   • Cystocele with uterine prolapse_OBGYN 12/11/2019   • Decreased hearing of both ears 09/17/2019   • Type 2 diabetes mellitus without complication, without long-term current use of insulin_diet control 10/10/2016   • Essential hypertension 10/09/2016   • Dyslipidemia 10/09/2016   • Mild persistent asthma without complication 10/09/2016   • Gastroesophageal reflux disease without esophagitis 10/09/2016       SURGICAL HISTORY   has a past surgical history that includes upper or lower gi procedure with anesthesia; colonoscopy; and dental surgery (2016).    ALLERGIES  Allergies   Allergen Reactions   • Augmentin Hives     Hard to breath   • Aspirin Nausea     Heart burn, even with  omeprazole       CURRENT MEDICATIONS  Home Medications     Reviewed by Les Elena D.O. (Physician) on 01/11/21 at 1832  Med List Status: <None>   Medication Last Dose Status   atorvastatin (LIPITOR) 10 MG Tab Taking Active   levalbuterol (XOPENEX HFA) 45 MCG/ACT inhaler Taking Active   losartan (COZAAR) 25 MG Tab Taking Active   Multiple Vitamins-Minerals (CENTRUM SILVER ADULT 50+ PO) Taking Active   QVAR REDIHALER 40 MCG/ACT inhaler Taking Active                SOCIAL HISTORY  Social History     Tobacco Use   • Smoking status: Never Smoker   • Smokeless tobacco: Never Used   Substance and Sexual Activity   • Alcohol use: No   • Drug use: No   • Sexual activity: Not Currently     Partners: Male       FAMILY HISTORY  Family History   Problem Relation Age of Onset   • Other Mother         Intestinal Problem   • Cancer Father         Stomach Cancer   • Lung Disease Father         Smoker   • Cancer Sister         breast cancer/mastectomy   • Diabetes Brother    • Cancer Maternal Grandmother         bone cancer   • No Known Problems Maternal Grandfather    • Diabetes Brother    • Diabetes Sister           Objective:   PHYSICAL EXAM  VITAL SIGNS: /70 (BP Location: Left arm, Patient Position: Sitting, BP Cuff Size: Small adult)   Pulse 60   Temp 37.6 °C (99.6 °F) (Temporal)   Resp 16   Ht 1.524 m (5')   Wt 45.4 kg (100 lb)   LMP  (LMP Unknown)   SpO2 97%   BMI 19.53 kg/m²     Gen: no acute distress, normal voice  Skin: dry, intact, moist mucosal membranes  Lungs: CTAB w/ symmetric expansion  CV: RRR w/o murmurs or clicks  ABD: Bowel sounds present.  Soft.  No TTP, rebound or guarding.  Psych: normal affect, normal judgement, alert, awake    Assessment/Plan:     1. Diarrhea of presumed infectious origin  sulfamethoxazole-trimethoprim (BACTRIM DS) 800-160 MG tablet   2. Colitis  sulfamethoxazole-trimethoprim (BACTRIM DS) 800-160 MG tablet   Differentials are broad but given her age and continuation of  symptoms for over 4 days, I believe it is reasonable to treat with antibiotics for management of possible colitis. Vitals are unremarkable and no red flags on exam.  Patient has PCN allergy and cannot use Augmentin.  She has been on Bactrim in the past which she has tolerated.  -Rx sent for Bactrim  -Advance diet as tolerated  -Encouraged 50-50 mix Gatorade/water  -If symptoms do not begin to improve within the next 24 to 48 hours or worsen at any point she was instructed to go to the ED for further evaluation and treatment  -Pt was in full understanding and agreement with the plan.    Differential diagnosis, natural history, supportive care, and indications for immediate follow-up discussed. All questions answered. Patient agrees with the plan of care.    Follow-up as needed if symptoms worsen or fail to improve to PCP, Urgent care or Emergency Room.    Please note that this dictation was created using voice recognition software. I have made a reasonable attempt to correct obvious errors, but I expect that there are errors of grammar and possibly content that I did not discover before finalizing the note.

## 2021-01-26 ENCOUNTER — HOSPITAL ENCOUNTER (OUTPATIENT)
Dept: LAB | Facility: MEDICAL CENTER | Age: 76
End: 2021-01-26
Attending: FAMILY MEDICINE
Payer: MEDICARE

## 2021-01-26 DIAGNOSIS — E11.9 TYPE 2 DIABETES MELLITUS WITHOUT COMPLICATION, WITHOUT LONG-TERM CURRENT USE OF INSULIN (HCC): ICD-10-CM

## 2021-01-26 DIAGNOSIS — E78.5 DYSLIPIDEMIA: ICD-10-CM

## 2021-01-26 LAB
ALBUMIN SERPL BCP-MCNC: 4.4 G/DL (ref 3.2–4.9)
ALBUMIN/GLOB SERPL: 1.5 G/DL
ALP SERPL-CCNC: 67 U/L (ref 30–99)
ALT SERPL-CCNC: 17 U/L (ref 2–50)
ANION GAP SERPL CALC-SCNC: 13 MMOL/L (ref 7–16)
AST SERPL-CCNC: 22 U/L (ref 12–45)
BASOPHILS # BLD AUTO: 0.8 % (ref 0–1.8)
BASOPHILS # BLD: 0.04 K/UL (ref 0–0.12)
BILIRUB SERPL-MCNC: 0.4 MG/DL (ref 0.1–1.5)
BUN SERPL-MCNC: 16 MG/DL (ref 8–22)
CALCIUM SERPL-MCNC: 9.3 MG/DL (ref 8.5–10.5)
CHLORIDE SERPL-SCNC: 99 MMOL/L (ref 96–112)
CHOLEST SERPL-MCNC: 157 MG/DL (ref 100–199)
CO2 SERPL-SCNC: 23 MMOL/L (ref 20–33)
CREAT SERPL-MCNC: 0.54 MG/DL (ref 0.5–1.4)
CREAT UR-MCNC: 23.63 MG/DL
EOSINOPHIL # BLD AUTO: 0.06 K/UL (ref 0–0.51)
EOSINOPHIL NFR BLD: 1.2 % (ref 0–6.9)
ERYTHROCYTE [DISTWIDTH] IN BLOOD BY AUTOMATED COUNT: 39.7 FL (ref 35.9–50)
EST. AVERAGE GLUCOSE BLD GHB EST-MCNC: 120 MG/DL
FASTING STATUS PATIENT QL REPORTED: NORMAL
GLOBULIN SER CALC-MCNC: 2.9 G/DL (ref 1.9–3.5)
GLUCOSE SERPL-MCNC: 93 MG/DL (ref 65–99)
HBA1C MFR BLD: 5.8 % (ref 0–5.6)
HCT VFR BLD AUTO: 44.2 % (ref 37–47)
HDLC SERPL-MCNC: 65 MG/DL
HGB BLD-MCNC: 14.3 G/DL (ref 12–16)
IMM GRANULOCYTES # BLD AUTO: 0.01 K/UL (ref 0–0.11)
IMM GRANULOCYTES NFR BLD AUTO: 0.2 % (ref 0–0.9)
LDLC SERPL CALC-MCNC: 76 MG/DL
LYMPHOCYTES # BLD AUTO: 2.07 K/UL (ref 1–4.8)
LYMPHOCYTES NFR BLD: 41.6 % (ref 22–41)
MCH RBC QN AUTO: 31.2 PG (ref 27–33)
MCHC RBC AUTO-ENTMCNC: 32.4 G/DL (ref 33.6–35)
MCV RBC AUTO: 96.3 FL (ref 81.4–97.8)
MICROALBUMIN UR-MCNC: <1.2 MG/DL
MICROALBUMIN/CREAT UR: NORMAL MG/G (ref 0–30)
MONOCYTES # BLD AUTO: 0.48 K/UL (ref 0–0.85)
MONOCYTES NFR BLD AUTO: 9.6 % (ref 0–13.4)
NEUTROPHILS # BLD AUTO: 2.32 K/UL (ref 2–7.15)
NEUTROPHILS NFR BLD: 46.6 % (ref 44–72)
NRBC # BLD AUTO: 0 K/UL
NRBC BLD-RTO: 0 /100 WBC
PLATELET # BLD AUTO: 238 K/UL (ref 164–446)
PMV BLD AUTO: 9.7 FL (ref 9–12.9)
POTASSIUM SERPL-SCNC: 4 MMOL/L (ref 3.6–5.5)
PROT SERPL-MCNC: 7.3 G/DL (ref 6–8.2)
RBC # BLD AUTO: 4.59 M/UL (ref 4.2–5.4)
SODIUM SERPL-SCNC: 135 MMOL/L (ref 135–145)
TRIGL SERPL-MCNC: 79 MG/DL (ref 0–149)
WBC # BLD AUTO: 5 K/UL (ref 4.8–10.8)

## 2021-01-26 PROCEDURE — 80061 LIPID PANEL: CPT

## 2021-01-26 PROCEDURE — 82570 ASSAY OF URINE CREATININE: CPT

## 2021-01-26 PROCEDURE — 36415 COLL VENOUS BLD VENIPUNCTURE: CPT

## 2021-01-26 PROCEDURE — 80053 COMPREHEN METABOLIC PANEL: CPT

## 2021-01-26 PROCEDURE — 82043 UR ALBUMIN QUANTITATIVE: CPT

## 2021-01-26 PROCEDURE — 85025 COMPLETE CBC W/AUTO DIFF WBC: CPT

## 2021-01-26 PROCEDURE — 83036 HEMOGLOBIN GLYCOSYLATED A1C: CPT | Mod: GA

## 2021-01-28 NOTE — PROGRESS NOTES
ESTABLISHED PATIENT PRE-VISIT PLANNING     01/28/21 Called & spoke to patient. Advised Virtual Visit scheduled Monday, 2/01/21@1:30PM-Dr. Treviño. Mike active. Advised patient to download Zoom. Offered & attempted to assist patient by phone with downloading zoom larry, however, pt declined & said her  would do it. Informed patient not being ready at time of appointment can potentially cause appointment to be rescheduled. Labs Done.    Patient was contacted to complete PVP.     Note: Patient will not be contacted if there is no indication to call.     1.  Reviewed notes from the last few office visits within the medical group: Yes    2.  If any orders were placed at last visit or intended to be done for this visit (i.e. 6 mos follow-up), do we have Results/Consult Notes?         •  Labs - Labs ordered, completed on 01/26/21 and results are in chart.  Note: If patient appointment is for lab review and patient did not complete labs, check with provider if OK to reschedule patient until labs completed.       •  Imaging - Imaging was not ordered at last office visit.       •  Referrals - No referrals were ordered at last office visit.    3. Is this appointment scheduled as a Hospital Follow-Up? No    4.  Immunizations were updated in Epic using Reconcile Outside Information activity? Yes    5.  Patient is due for the following Health Maintenance Topics:   Health Maintenance Due   Topic Date Due   • Annual Wellness Visit  1945   • COVID-19 Vaccine (1 of 2) 02/18/1961   • RETINAL SCREENING  02/18/1963   • IMM HEP B VACCINE (2 of 3 - Risk 3-dose series) 02/14/2020   • DIABETES MONOFILAMENT / LE EXAM  01/17/2021       6.  AHA (Pulse8) form printed for Provider? N/A

## 2021-02-01 ENCOUNTER — TELEMEDICINE (OUTPATIENT)
Dept: MEDICAL GROUP | Age: 76
End: 2021-02-01
Payer: MEDICARE

## 2021-02-01 VITALS
HEART RATE: 62 BPM | BODY MASS INDEX: 19.63 KG/M2 | DIASTOLIC BLOOD PRESSURE: 68 MMHG | HEIGHT: 60 IN | WEIGHT: 100 LBS | SYSTOLIC BLOOD PRESSURE: 122 MMHG

## 2021-02-01 DIAGNOSIS — K21.9 GASTROESOPHAGEAL REFLUX DISEASE WITHOUT ESOPHAGITIS: ICD-10-CM

## 2021-02-01 DIAGNOSIS — J45.30 MILD PERSISTENT ASTHMA WITHOUT COMPLICATION: ICD-10-CM

## 2021-02-01 DIAGNOSIS — E78.5 DYSLIPIDEMIA: ICD-10-CM

## 2021-02-01 DIAGNOSIS — N95.2 ATROPHIC VAGINITIS: ICD-10-CM

## 2021-02-01 DIAGNOSIS — E11.9 TYPE 2 DIABETES MELLITUS WITHOUT COMPLICATION, WITHOUT LONG-TERM CURRENT USE OF INSULIN (HCC): ICD-10-CM

## 2021-02-01 DIAGNOSIS — I10 ESSENTIAL HYPERTENSION: ICD-10-CM

## 2021-02-01 DIAGNOSIS — N81.4 CYSTOCELE WITH UTERINE PROLAPSE: ICD-10-CM

## 2021-02-01 DIAGNOSIS — Z00.00 MEDICARE ANNUAL WELLNESS VISIT, SUBSEQUENT: Primary | ICD-10-CM

## 2021-02-01 PROBLEM — K14.1 GEOGRAPHIC TONGUE: Status: RESOLVED | Noted: 2020-01-19 | Resolved: 2021-02-01

## 2021-02-01 PROBLEM — M67.441 GANGLION CYST OF BOTH HANDS: Status: ACTIVE | Noted: 2021-02-01

## 2021-02-01 PROBLEM — M67.442 GANGLION CYST OF BOTH HANDS: Status: ACTIVE | Noted: 2021-02-01

## 2021-02-01 PROBLEM — H91.93 DECREASED HEARING OF BOTH EARS: Status: RESOLVED | Noted: 2019-09-17 | Resolved: 2021-02-01

## 2021-02-01 PROCEDURE — G0439 PPPS, SUBSEQ VISIT: HCPCS | Mod: 95,CR | Performed by: FAMILY MEDICINE

## 2021-02-01 RX ORDER — ATORVASTATIN CALCIUM 10 MG/1
10 TABLET, FILM COATED ORAL EVERY EVENING
Qty: 90 TAB | Refills: 3 | Status: SHIPPED | OUTPATIENT
Start: 2021-02-01 | End: 2022-01-31

## 2021-02-01 ASSESSMENT — ACTIVITIES OF DAILY LIVING (ADL): BATHING_REQUIRES_ASSISTANCE: 0

## 2021-02-01 ASSESSMENT — ENCOUNTER SYMPTOMS: GENERAL WELL-BEING: GOOD

## 2021-02-01 ASSESSMENT — PATIENT HEALTH QUESTIONNAIRE - PHQ9: CLINICAL INTERPRETATION OF PHQ2 SCORE: 0

## 2021-02-01 ASSESSMENT — FIBROSIS 4 INDEX: FIB4 SCORE: 1.68

## 2021-02-01 NOTE — PROGRESS NOTES
Chief Complaint   Patient presents with   • Annual Exam     AWV     This evaluation was conducted via Zoom using secure and encrypted videoconferencing technology. The patient was in a private location in the state of [place].    The patient's identity was confirmed and verbal consent was obtained for this virtual visit.      HPI:  Ila is a 75 y.o. here for Medicare Annual Wellness Visit    Pt is doing well. No acute concerns today. She has a few questions regarding Covid vaccines.     Patient Active Problem List    Diagnosis Date Noted   • Ganglion cyst of both hands 02/01/2021   • Atrophic vaginitis 01/17/2020   • FHx: colon cancer 01/17/2020   • Cystocele with uterine prolapse_OBGYN 12/11/2019   • Type 2 diabetes mellitus without complication, without long-term current use of insulin_diet control 10/10/2016   • Essential hypertension 10/09/2016   • Dyslipidemia 10/09/2016   • Mild persistent asthma without complication 10/09/2016   • Gastroesophageal reflux disease without esophagitis 10/09/2016       Current Outpatient Medications   Medication Sig Dispense Refill   • atorvastatin (LIPITOR) 10 MG Tab Take 1 Tab by mouth every evening. 90 Tab 3   • Calcium Carbonate Antacid (TUMS PO) Take 1,000 mg by mouth 1 time a day as needed.     • QVAR REDIHALER 40 MCG/ACT inhaler USE 2 INHALATIONS TWICE A DAY 10.6 g 11   • losartan (COZAAR) 25 MG Tab Take 1 Tab by mouth every day. 90 Tab 3   • Multiple Vitamins-Minerals (CENTRUM SILVER ADULT 50+ PO) Take  by mouth.     • levalbuterol (XOPENEX HFA) 45 MCG/ACT inhaler Inhale 1-2 Puffs by mouth every four hours as needed for Shortness of Breath.       No current facility-administered medications for this visit.         Patient is taking medications as noted in medication list.  Current supplements as per medication list.     Allergies: Augmentin and Aspirin    Current social contact/activities: pt states none     Is patient current with immunizations? No    She  reports that  she has never smoked. She has never used smokeless tobacco. She reports that she does not drink alcohol or use drugs.  Counseling given: Not Answered        DPA/Advanced directive: Patient has Living Will, but it is not on file. Instructed to bring in a copy to scan into their chart.    ROS:    Gait: Uses no assistive device   Ostomy: No   Other tubes: No   Amputations: No   Chronic oxygen use No   Last eye exam last year   Wears hearing aids: No   : Reports urinary leakage during the last 6 months that has not interfered at all with their daily activities or sleep.    Screening:    Depression Screening    Little interest or pleasure in doing things?  0 - not at all  Feeling down, depressed, or hopeless? 0 - not at all    Screening for Cognitive Impairment    Three Minute Recall (river, nation, finger)  3/3    Draw clock face with all 12 numbers and set the hands to show 10 past 11.    Unable to complete due to VV    Fall Risk Assessment    Has the patient had two or more falls in the last year or any fall with injury in the last year?  No    Safety Assessment    Throw rugs on floor.  No  Handrails on all stairs.  Yes  Good lighting in all hallways.  No  Difficulty hearing.  Yes  Patient counseled about all safety risks that were identified.    Functional Assessment ADLs    Are there any barriers preventing you from cooking for yourself or meeting nutritional needs?  No.    Are there any barriers preventing you from driving safely or obtaining transportation?  No.    Are there any barriers preventing you from using a telephone or calling for help?  No.    Are there any barriers preventing you from shopping?  No.    Are there any barriers preventing you from taking care of your own finances?  No.    Are there any barriers preventing you from managing your medications?    No.    Are there any barriers preventing you from showering, bathing or dressing yourself?  No.    Are you currently engaging in any exercise or  physical activity?  Yes.     What is your perception of your health?  Good.    Health Maintenance Summary                COVID-19 Vaccine Overdue 2/18/1961     RETINAL SCREENING Overdue 2/18/1963     IMM HEP B VACCINE Overdue 2/14/2020      Done 1/17/2020 Imm Admin: Hepatitis B Vaccine (Adol/Adult)    DIABETES MONOFILAMENT / LE EXAM Overdue 1/17/2021      Done 1/17/2020 AMB DIABETIC MONOFILAMENT LOWER EXTREMITY EXAM     Patient has more history with this topic...    A1C SCREENING Next Due 7/26/2021      Done 1/26/2021 HEMOGLOBIN A1C     Patient has more history with this topic...    MAMMOGRAM Next Due 12/17/2021      Done 12/17/2020 MA-SCREENING MAMMO BILAT W/TOMOSYNTHESIS W/CAD     Patient has more history with this topic...    FASTING LIPID PROFILE Next Due 1/26/2022      Done 1/26/2021 LIPID PROFILE     Patient has more history with this topic...    URINE ACR / MICROALBUMIN Next Due 1/26/2022      Done 1/26/2021 MICROALBUMIN CREAT RATIO URINE     Patient has more history with this topic...    SERUM CREATININE Next Due 1/26/2022      Done 1/26/2021 COMP METABOLIC PANEL     Patient has more history with this topic...    Annual Wellness Visit Next Due 2/2/2022      Done 2/1/2021 Visit Dx: Medicare annual wellness visit, subsequent     Patient has more history with this topic...    BONE DENSITY Next Due 5/3/2022      Done 5/3/2017 DS-BONE DENSITY STUDY (DEXA)    COLONOSCOPY Next Due 10/13/2025      Done 10/13/2015 REFERRAL TO GI FOR COLONOSCOPY    IMM DTaP/Tdap/Td Vaccine Next Due 7/20/2029      Done 7/20/2019 Imm Admin: Tdap Vaccine          Patient Care Team:  Erin Treviño M.D. as PCP - General (Family Medicine)  Mateus Garcia III, M.D. as Consulting Physician (Gastroenterology)  Kingston Grider M.D. (Allergy)  Michael Hillman M.D. (Otolaryngology)      Social History     Tobacco Use   • Smoking status: Never Smoker   • Smokeless tobacco: Never Used   Substance Use Topics   • Alcohol use: No   • Drug use: No          Family History   Problem Relation Age of Onset   • Other Mother         Intestinal Problem   • Diabetes Mother    • Cancer Father         Stomach Cancer   • Lung Disease Father         Smoker   • Cancer Sister         breast cancer/mastectomy   • Diabetes Sister    • Diabetes Brother    • Cancer Maternal Grandmother         bone cancer   • No Known Problems Maternal Grandfather    • Diabetes Brother    • No Known Problems Sister    • No Known Problems Paternal Grandmother    • No Known Problems Paternal Grandfather      She  has a past medical history of Asthma, Diabetes (HCC), and GERD (gastroesophageal reflux disease).   Past Surgical History:   Procedure Laterality Date   • DENTAL SURGERY  2016    bone graft   • COLONOSCOPY      negative   • UPPER OR LOWER GI PROCEDURE WITH ANESTHESIA      x 2 upper GI         Exam:     /68 (BP Location: Left arm, Patient Position: Sitting, BP Cuff Size: Adult)   Pulse 62   Ht 1.524 m (5')   Wt 45.4 kg (100 lb)  Body mass index is 19.53 kg/m².    Hearing excellent.    Dentition good  Alert, oriented in no acute distress.  Eye contact is good, speech goal directed, affect calm      Assessment and Plan. The following treatment and monitoring plan is recommended:      1. Dyslipidemia  Chronic, controlled with Lipitor 10 mg qd, no s/e reported, will continue.    - atorvastatin (LIPITOR) 10 MG Tab; Take 1 Tab by mouth every evening.  Dispense: 90 Tab; Refill: 3    2. Essential hypertension  Chronic, controlled with Losartan 25 mg qd, no s/e reported, will continue.      3. Gastroesophageal reflux disease without esophagitis  Diet controlled.     4. Mild persistent asthma without complication  Chronic, controlled with Qvar and Xopenex PRN, no s/e reported, will continue.      5. Type 2 diabetes mellitus without complication, without long-term current use of insulin_diet control  Chronic, diet controlled, most recent A1C was 5.8.   - dietary modification, exercise, weight  loss  - Annual eye exam  - Regular foot exam  - Follow up in 6 months.   - Comp Metabolic Panel; Future  - HEMOGLOBIN A1C; Future    6. Cystocele with uterine prolapse_OBGYN  7. Atrophic vaginitis  Declined treatment  Symptoms are not bothersome.     8. Medicare annual wellness visit, subsequent  - Subsequent Annual Wellness Visit - Includes PPPS ()     Services suggested: No services needed at this time  Health Care Screening recommendations as per orders if indicated.  Referrals offered: PT/OT/Nutrition counseling/Behavioral Health/Smoking cessation as per orders if indicated.    Discussion today about general wellness and lifestyle habits:    · Prevent falls and reduce trip hazards; Cautioned about securing or removing rugs.  · Have a working fire alarm and carbon monoxide detector;   · Engage in regular physical activity and social activities       Follow-up: Return in about 6 months (around 8/1/2021) for Multiple issues.

## 2021-05-07 RX ORDER — LEVALBUTEROL TARTRATE 45 UG/1
1-2 AEROSOL, METERED ORAL EVERY 4 HOURS PRN
Qty: 15 G | Refills: 5 | Status: SHIPPED | OUTPATIENT
Start: 2021-05-07 | End: 2021-08-24 | Stop reason: SDUPTHER

## 2021-05-10 ENCOUNTER — TELEPHONE (OUTPATIENT)
Dept: MEDICAL GROUP | Age: 76
End: 2021-05-10

## 2021-05-10 NOTE — TELEPHONE ENCOUNTER
ESTABLISHED PATIENT PRE-VISIT PLANNING   Monday, 05/10/21@2:00PM:    Phone Number Called: [phone] (home)   Call outcome: No answer. Left voice message.  Message: Advised office visit scheduled with  Tuesday, 05/11/21@4:30PM. Gave virtual visit option.    Patient was contacted to complete PVP.     Note: Patient will not be contacted if there is no indication to call.     1.  Reviewed notes from the last few office visits within the medical group: Yes    2.  If any orders were placed at last visit or intended to be done for this visit (i.e. 6 mos follow-up), do we have Results/Consult Notes?         •  Labs - Labs ordered, NOT completed. Patient advised to complete prior to next appointment.  Note: If patient appointment is for lab review and patient did not complete labs, check with provider if OK to reschedule patient until labs completed.       •  Imaging - Imaging was not ordered at last office visit.       •  Referrals - No referrals were ordered at last office visit.    3. Is this appointment scheduled as a Hospital Follow-Up? No    4.  Immunizations were updated in Epic using Reconcile Outside Information activity? Yes    5.  Patient is due for the following Health Maintenance Topics:   Health Maintenance Due   Topic Date Due   • RETINAL SCREENING  Never done   • IMM HEP B VACCINE (2 of 3 - Risk 3-dose series) 02/14/2020   • DIABETES MONOFILAMENT / LE EXAM  01/17/2021     6.  AHA (Pulse8) form printed for Provider? N/A

## 2021-05-11 ENCOUNTER — OFFICE VISIT (OUTPATIENT)
Dept: MEDICAL GROUP | Age: 76
End: 2021-05-11
Payer: MEDICARE

## 2021-05-11 VITALS
DIASTOLIC BLOOD PRESSURE: 78 MMHG | WEIGHT: 103 LBS | BODY MASS INDEX: 20.22 KG/M2 | TEMPERATURE: 98.5 F | OXYGEN SATURATION: 95 % | HEART RATE: 78 BPM | SYSTOLIC BLOOD PRESSURE: 128 MMHG | HEIGHT: 60 IN

## 2021-05-11 DIAGNOSIS — E11.9 TYPE 2 DIABETES MELLITUS WITHOUT COMPLICATION, WITHOUT LONG-TERM CURRENT USE OF INSULIN (HCC): ICD-10-CM

## 2021-05-11 DIAGNOSIS — I10 ESSENTIAL HYPERTENSION: ICD-10-CM

## 2021-05-11 DIAGNOSIS — N81.4 CYSTOCELE WITH UTERINE PROLAPSE: ICD-10-CM

## 2021-05-11 DIAGNOSIS — M54.81 OCCIPITAL NEURALGIA OF LEFT SIDE: Primary | ICD-10-CM

## 2021-05-11 DIAGNOSIS — J30.9 ALLERGIC RHINITIS, UNSPECIFIED SEASONALITY, UNSPECIFIED TRIGGER: ICD-10-CM

## 2021-05-11 PROCEDURE — 99215 OFFICE O/P EST HI 40 MIN: CPT | Performed by: FAMILY MEDICINE

## 2021-05-11 ASSESSMENT — FIBROSIS 4 INDEX: FIB4 SCORE: 1.7

## 2021-05-12 NOTE — PROGRESS NOTES
Subjective:   CC: left-sided occipital headache    HPI:     Ila Bangura is a 76 y.o. female, established patient of the clinic, presents with the following concerns:     Pt presents with acute onset of intermittent brief left-sided mild occipital headache, onset after Covid 19 vaccines. Pain is described as mild, sharp, and brief. She also complained of dizziness, increased post nasal drips, nasal discharge especially in the morning. She denies hx of head trauma, fever, chills, visual changes, focal weakness/numbness, facial droopiness, auditory problems, ear pain, ear discharge, stiff neck or reduced cervical ROM, or skin rash. She has not tried any medications for these conditions.     She also has essential hypertension, currently taking Losartan 25 mg daily. He is compliant to medication.     Pt also has chronic cystocele with uterine prolapse. She is working with OBGYN. She is not interested in surgical options. She is also hesitant about pessary as she does not want to have sensation of foreign body in her genital area. Right now, symptoms are not too bothersome to her. When she sits on the toilet, about 1 inch of the cervix is protruding through vaginal canal. She uses her finger to force it back inside. Neg active urogenital symptoms reported.      Current medicines (including changes today)  Current Outpatient Medications   Medication Sig Dispense Refill   • levalbuterol (XOPENEX HFA) 45 MCG/ACT inhaler Inhale 1-2 Puffs every four hours as needed for Shortness of Breath. 15 g 5   • atorvastatin (LIPITOR) 10 MG Tab Take 1 Tab by mouth every evening. 90 Tab 3   • Calcium Carbonate Antacid (TUMS PO) Take 1,000 mg by mouth 1 time a day as needed.     • QVAR REDIHALER 40 MCG/ACT inhaler USE 2 INHALATIONS TWICE A DAY 10.6 g 11   • losartan (COZAAR) 25 MG Tab Take 1 Tab by mouth every day. 90 Tab 3   • Multiple Vitamins-Minerals (CENTRUM SILVER ADULT 50+ PO) Take  by mouth.       No current  facility-administered medications for this visit.     She  has a past medical history of Asthma, Diabetes (HCC), and GERD (gastroesophageal reflux disease).    I personally reviewed patient's problem list, allergies, medications, family hx, social hx with patient and update EPIC.     REVIEW OF SYSTEMS:  CONSTITUTIONAL:  Denies night sweats, fatigue, malaise, lethargy, fever or chills.  RESPIRATORY:  Denies cough, wheeze, hemoptysis, or shortness of breath.  CARDIOVASCULAR:  Denies chest pains, palpitations, pedal edema     Objective:     /78 (BP Location: Left arm, Patient Position: Sitting, BP Cuff Size: Adult)   Pulse 78   Temp 36.9 °C (98.5 °F) (Temporal)   Ht 1.524 m (5')   Wt 46.7 kg (103 lb)   SpO2 95%  Body mass index is 20.12 kg/m².    Physical Exam:  Constitutional: awake, alert, in no distress.  Skin: Warm, dry, good turgor, no rashes, bruises, ulcers in visible areas.  - scalp exam within normal limits   Eye: conjunctiva clear, lids neg for edema or lesions.  ENMT: bulging TMs bilaterally without effusion. Pale nasal mucosa with inferior nasal turbinate hypertrophy bilaterally. Lips without lesions, good dentition, oropharynx clear.  Neck: Trachea midline, no masses, no thyromegaly. No cervical or supraclavicular lymphadenopathy  Respiratory: Unlabored respiratory effort, lungs clear to auscultation, no wheezes, no rales.  Cardiovascular: Normal S1, S2, no murmur, no pedal edema.  Neuro: CN2-12 grossly intact. Strength 5/5, reflexes 2/4 in all extremities, no sensory deficits.   Psych: Oriented x3, affect and mood wnl, intact judgement and insight.       Assessment and Plan:   The following treatment plan was discussed    1. Occipital neuralgia of left side  Hx and exam are concerning for acute, mild, occipital neuralgia.   Intact neurological exam. Neg hx of head trauma.   Conservative management recommended and discussed.   Pt will cont to monitor symptoms and follow up with me PRN.     2.  Allergic rhinitis, unspecified seasonality, unspecified trigger  - Nasal saline irrigation 2-3 times per day to reduce allergen load  - Over-the-counter nasal steroid (Flonase, NasoNex, or Nasacort)   - Over-the-counter oral anti-histamine PRN (Claritin, Allegra, or Zyrtec)    3. Essential hypertension  Chronic, controlled with Losartan 25 mg qd, no s/e reported, will continue.      5. Cystocele with uterine prolapse_OBGYN  I had long discussion with patient regarding prognosis and treatment options.   She will cont to f/u with OBGYN to discuss management.     Total time spent reviewing pt's chart, labs, notes, imaging, and counseling/prescribing medications to patient before, during, and after the visit: 40 minutes.        Erin Treviño M.D.      Followup: Return for As needed.    Please note that this dictation was created using voice recognition software. I have made every reasonable attempt to correct obvious errors, but I expect that there are errors of grammar and possibly content that I did not discover before finalizing the note.

## 2021-08-17 ENCOUNTER — PATIENT MESSAGE (OUTPATIENT)
Dept: MEDICAL GROUP | Age: 76
End: 2021-08-17

## 2021-08-17 DIAGNOSIS — K21.9 GASTROESOPHAGEAL REFLUX DISEASE WITHOUT ESOPHAGITIS: ICD-10-CM

## 2021-08-17 NOTE — TELEPHONE ENCOUNTER
"From: Ila Bangura  To: Physician Erin Treviño  Sent: 8/17/2021 3:51 PM PDT  Subject: Acid Reflux    Hi Dr Treviño,     i\"m having acid reflux everyday and I'm taking Rm antacid 2 tablets 2000mg for about two months now and still having acid reflux every night I woke up because my stomach is like having a war inside. I think I need to start taking omeprazole again. I would appreciate if you can [facility].    Thank you  Ila Bangura  "

## 2021-08-18 RX ORDER — OMEPRAZOLE 20 MG/1
CAPSULE, DELAYED RELEASE ORAL
Qty: 90 CAPSULE | Refills: 1 | Status: SHIPPED | OUTPATIENT
Start: 2021-08-18 | End: 2022-05-26 | Stop reason: SDUPTHER

## 2021-08-24 RX ORDER — LEVALBUTEROL TARTRATE 45 UG/1
1-2 AEROSOL, METERED ORAL EVERY 4 HOURS PRN
Qty: 15 G | Refills: 0 | Status: SHIPPED | OUTPATIENT
Start: 2021-08-24 | End: 2021-08-31 | Stop reason: SDUPTHER

## 2021-08-25 ENCOUNTER — PATIENT MESSAGE (OUTPATIENT)
Dept: MEDICAL GROUP | Age: 76
End: 2021-08-25

## 2021-08-25 DIAGNOSIS — J45.30 MILD PERSISTENT ASTHMA WITHOUT COMPLICATION: ICD-10-CM

## 2021-08-31 RX ORDER — LEVALBUTEROL TARTRATE 45 UG/1
1-2 AEROSOL, METERED ORAL EVERY 4 HOURS PRN
Qty: 45 G | Refills: 3 | Status: SHIPPED | OUTPATIENT
Start: 2021-08-31 | End: 2021-09-07 | Stop reason: SDUPTHER

## 2021-09-07 DIAGNOSIS — J45.30 MILD PERSISTENT ASTHMA WITHOUT COMPLICATION: ICD-10-CM

## 2021-09-07 RX ORDER — LEVALBUTEROL TARTRATE 45 UG/1
1-2 AEROSOL, METERED ORAL EVERY 4 HOURS PRN
Qty: 45 G | Refills: 3 | Status: SHIPPED | OUTPATIENT
Start: 2021-09-07 | End: 2023-01-09 | Stop reason: SDUPTHER

## 2021-09-28 ENCOUNTER — OFFICE VISIT (OUTPATIENT)
Dept: MEDICAL GROUP | Age: 76
End: 2021-09-28
Payer: MEDICARE

## 2021-09-28 DIAGNOSIS — H25.9 AGE-RELATED CATARACT OF BOTH EYES, UNSPECIFIED AGE-RELATED CATARACT TYPE: ICD-10-CM

## 2021-09-28 DIAGNOSIS — I10 ESSENTIAL HYPERTENSION: ICD-10-CM

## 2021-09-28 DIAGNOSIS — F41.0 PANIC ATTACKS: ICD-10-CM

## 2021-09-28 DIAGNOSIS — R51.9 ACUTE NONINTRACTABLE HEADACHE, UNSPECIFIED HEADACHE TYPE: ICD-10-CM

## 2021-09-28 PROCEDURE — 99215 OFFICE O/P EST HI 40 MIN: CPT | Performed by: FAMILY MEDICINE

## 2021-09-28 ASSESSMENT — FIBROSIS 4 INDEX: FIB4 SCORE: 1.7

## 2021-09-28 NOTE — PROGRESS NOTES
Subjective:   CC: cataract surgery follow up     HPI:     Ila Bangura is a 76 y.o. female, established patient of the clinic.     Patient was diagnosed with bilateral cataract. She under went cataract surgery on the left eye on 9/7/2021. She was advised to use steroid eye drops. Unfortunately, this medication causes significant burning sensation and discomfort. She returned to Ophthalmology and was told that her cornea was accidentally scratched during the surgery. Abx eyedrops Moxifloxacin and Ketorolac 0.5% solution were prescribed. She was advised to take prednisolone 1% eyedrops, Moxifloxacin 0.5 % eyedrops, and Ketorolac 0.5 % eyedrops every 4 hours until complete the bottles. Unfortunately, she develops elevated blood pressure and acute palpitation each time she attempts to instill these medications. She called her Ophthalmologist and was advised to follow up with PCP for consultation. She was taking these eyedrops for about 10 days and has quit them about 10 days ago. Her blood pressure is slowly returning to baseline. Palpitation is no longer present. She denies any visual symptoms except for dry sensation on the left. Her eye doctors: Dr. Nascimento, Dr. Zeng.     She also complained of acute intermittent headache at the occipital area when she lies down or when she watches TV (lying down). Pain is sharp and moderate in severity. Denies fever, chills, neck pain, head trauma, focal weakness, numbness, changes in vision, nausea, vomiting, speech or swallowing problems. Negative unexplained weight loss.       Current medicines (including changes today)  Current Outpatient Medications   Medication Sig Dispense Refill   • ALPRAZolam (XANAX) 0.25 MG Tab Take 1 Tablet by mouth at bedtime as needed for Anxiety for up to 15 days. 15 Tablet 0   • levalbuterol (XOPENEX HFA) 45 MCG/ACT inhaler Inhale 1-2 Puffs every four hours as needed for Shortness of Breath. 45 g 3   • omeprazole (PRILOSEC) 20 MG  delayed-release capsule TAKE 1 CAPSULE DAILY 90 Capsule 1   • atorvastatin (LIPITOR) 10 MG Tab Take 1 Tab by mouth every evening. 90 Tab 3   • Calcium Carbonate Antacid (TUMS PO) Take 1,000 mg by mouth 1 time a day as needed.     • QVAR REDIHALER 40 MCG/ACT inhaler USE 2 INHALATIONS TWICE A DAY 10.6 g 11   • losartan (COZAAR) 25 MG Tab Take 1 Tab by mouth every day. 90 Tab 3   • Multiple Vitamins-Minerals (CENTRUM SILVER ADULT 50+ PO) Take  by mouth.     • ketorolac (ACULAR) 0.5 % Solution        No current facility-administered medications for this visit.     She  has a past medical history of Asthma, Diabetes (HCC), and GERD (gastroesophageal reflux disease).    I personally reviewed patient's problem list, allergies, medications, family hx, social hx with patient and update EPIC.     REVIEW OF SYSTEMS:  CONSTITUTIONAL:  Denies night sweats, fatigue, malaise, lethargy, fever or chills.  RESPIRATORY:  Denies cough, wheeze, hemoptysis, or shortness of breath.  CARDIOVASCULAR:  Denies chest pains, palpitations, pedal edema     Objective:     /80 (BP Location: Right arm, Patient Position: Sitting, BP Cuff Size: Adult)   Pulse 78   Temp 36.9 °C (98.5 °F) (Temporal)   Ht 1.524 m (5')   Wt 47.2 kg (104 lb)   SpO2 100%  Body mass index is 20.31 kg/m².    Physical Exam:  Constitutional: awake, alert, in no distress.  Skin: Warm, dry, good turgor, no rashes, bruises, ulcers in visible areas.  Eye: faint conjunctival erythema of the left eye, very mild upper and lower left lids edema, intact visual fields and EOM, PERRL.   ENMT: TM and auditory canals wnl.    Neck: Trachea midline, no masses, no thyromegaly. No cervical or supraclavicular lymphadenopathy  Respiratory: Unlabored respiratory effort, lungs clear to auscultation, no wheezes, no rales.  Cardiovascular: Normal S1, S2, no murmur, no pedal edema.  Psych: Oriented x3, affect and mood wnl, intact judgement and insight.   MSK: Physical Exam  Neurological:       General: No focal deficit present.      Mental Status: She is alert and oriented to person, place, and time. Mental status is at baseline.      GCS: GCS eye subscore is 4. GCS verbal subscore is 5. GCS motor subscore is 6.      Cranial Nerves: Cranial nerves are intact.      Sensory: Sensation is intact.      Motor: Motor function is intact.      Coordination: Romberg sign negative. Coordination normal. Finger-Nose-Finger Test and Heel to Shin Test normal. Rapid alternating movements normal.      Gait: Tandem walk abnormal.      Deep Tendon Reflexes:      Reflex Scores:       Bicep reflexes are 1+ on the right side and 1+ on the left side.       Brachioradialis reflexes are 1+ on the right side and 1+ on the left side.       Patellar reflexes are 1+ on the right side and 1+ on the left side.       Achilles reflexes are 1+ on the right side and 1+ on the left side.      Assessment and Plan:   The following treatment plan was discussed    1. Age-related cataract of both eyes, unspecified age-related cataract type  77 yo female with bilateral cataract s/p left cataract surgery on 9/7/2021. She develops acute elevated BP and palpitation every time she needs to instill Prednisolone 1% eyedrops, Moxifloxacin 0.5 % eyedrops, and Ketorolac 0.5 % eyedrops as directed by her surgeon. She quit all medications after 9 days due to symptoms. She was advised to follow up with myself to discuss her symptoms. Her left eye is doing well today. Except for dryness, she has no other visual symptoms. Her BP was 170/80 in clinic today. BP repeated by myself was 140/80. Home BP is much lower according to patient. She is compliant to Losartan 25 mg qd. Eye exam is within normal limits except for very mild left upper and lower eyelid edema.   - moisturizing eye drops  - restart Ketorolac 0.5% as tolerated.   - follow up with ophthalmologist for post-op care   - ok to email me if she has any questions.     2. Panic attacks  Acute elevated  blood pressure and palpitation with instillation of eyedrops following cataract surgery, likely secondary to anxiety associated with pain/discomfort from the eye drops and/or eye procedure.   - ALPRAZolam (XANAX) 0.25 MG Tab; Take 1 Tablet by mouth at bedtime as needed for Anxiety for up to 15 days.  Dispense: 15 Tablet; Refill: 0    3. Essential hypertension  Chronic, previously controlled with Losartan 25 mg qd. She has been experiencing elevated BP acutely with ophthalmic eyedrops/procedures. BP is 170/80 in clinic today done by MA. Repeat BP by myself was 140/80. Home BP is much lower after discontinuation of eyedrops 10 days ago.   - continue Losartan 25 mg qd  - monitor home BP and keep log  - return to clinic in 4 weeks for BP recheck.     4. Acute nonintractable headache, unspecified headache type  Acute episodic occipital headache without obvious causes per history and physical exam. Given her age, will order brain MRI to rule out intracranial diseases.   - over-the-counter Tylenol PRN for pain   - MR-BRAIN-W/O; Future    Total time spent reviewing pt's chart, labs, notes, imaging, and counseling/prescribing medications to patient before, during, and after the visit: 40 minutes.      Erin Treviño M.D.      Followup: Return in about 4 weeks (around 10/26/2021) for Hypertension.    Please note that this dictation was created using voice recognition software. I have made every reasonable attempt to correct obvious errors, but I expect that there are errors of grammar and possibly content that I did not discover before finalizing the note.

## 2021-09-29 VITALS
HEIGHT: 60 IN | HEART RATE: 78 BPM | BODY MASS INDEX: 20.42 KG/M2 | SYSTOLIC BLOOD PRESSURE: 140 MMHG | TEMPERATURE: 98.5 F | WEIGHT: 104 LBS | OXYGEN SATURATION: 100 % | DIASTOLIC BLOOD PRESSURE: 80 MMHG

## 2021-09-29 RX ORDER — ALPRAZOLAM 0.25 MG/1
0.25 TABLET ORAL NIGHTLY PRN
Qty: 15 TABLET | Refills: 0 | Status: SHIPPED | OUTPATIENT
Start: 2021-09-29 | End: 2021-10-14

## 2021-09-29 RX ORDER — KETOROLAC TROMETHAMINE 5 MG/ML
SOLUTION OPHTHALMIC
COMMUNITY
Start: 2021-08-19 | End: 2022-03-11

## 2021-10-06 ENCOUNTER — HOSPITAL ENCOUNTER (OUTPATIENT)
Dept: RADIOLOGY | Facility: MEDICAL CENTER | Age: 76
End: 2021-10-06
Attending: FAMILY MEDICINE
Payer: MEDICARE

## 2021-10-06 DIAGNOSIS — R51.9 ACUTE NONINTRACTABLE HEADACHE, UNSPECIFIED HEADACHE TYPE: ICD-10-CM

## 2021-10-06 PROCEDURE — 70551 MRI BRAIN STEM W/O DYE: CPT | Mod: ME

## 2021-10-13 ENCOUNTER — PATIENT MESSAGE (OUTPATIENT)
Dept: MEDICAL GROUP | Age: 76
End: 2021-10-13

## 2021-10-13 DIAGNOSIS — I10 ESSENTIAL HYPERTENSION: ICD-10-CM

## 2021-10-14 NOTE — TELEPHONE ENCOUNTER
From: Ila Bangura  To: Physician Erin Treviño  Sent: 10/13/2021 10:21 AM PDT  Subject: Losartan Tabs 25mg     Dear Dr Treviño,  I received a message from [employer] to refill my LosartanTabs 25 mg and they need your approval. I called them to that this prescription should automatically refill but they want your authorization to continue it. Please send this approval to [facility]. Sorry to bother you again and thank you.    Ila Bangura

## 2021-10-15 RX ORDER — LOSARTAN POTASSIUM 25 MG/1
25 TABLET ORAL DAILY
Qty: 90 TABLET | Refills: 3 | Status: SHIPPED | OUTPATIENT
Start: 2021-10-15 | End: 2022-09-06 | Stop reason: SDUPTHER

## 2021-10-26 ENCOUNTER — OFFICE VISIT (OUTPATIENT)
Dept: MEDICAL GROUP | Age: 76
End: 2021-10-26
Payer: MEDICARE

## 2021-10-26 VITALS
BODY MASS INDEX: 20.42 KG/M2 | HEART RATE: 72 BPM | HEIGHT: 60 IN | DIASTOLIC BLOOD PRESSURE: 70 MMHG | SYSTOLIC BLOOD PRESSURE: 120 MMHG | TEMPERATURE: 98 F | WEIGHT: 104 LBS | OXYGEN SATURATION: 98 %

## 2021-10-26 DIAGNOSIS — Z23 NEED FOR VACCINATION: ICD-10-CM

## 2021-10-26 DIAGNOSIS — H25.9 AGE-RELATED CATARACT OF BOTH EYES, UNSPECIFIED AGE-RELATED CATARACT TYPE: ICD-10-CM

## 2021-10-26 DIAGNOSIS — E78.5 DYSLIPIDEMIA: ICD-10-CM

## 2021-10-26 DIAGNOSIS — J32.0 MAXILLARY SINUSITIS, CHRONIC: ICD-10-CM

## 2021-10-26 DIAGNOSIS — I10 ESSENTIAL HYPERTENSION: ICD-10-CM

## 2021-10-26 DIAGNOSIS — H10.13 ALLERGIC CONJUNCTIVITIS OF BOTH EYES: ICD-10-CM

## 2021-10-26 DIAGNOSIS — E11.9 TYPE 2 DIABETES MELLITUS WITHOUT COMPLICATION, WITHOUT LONG-TERM CURRENT USE OF INSULIN (HCC): ICD-10-CM

## 2021-10-26 PROCEDURE — 99215 OFFICE O/P EST HI 40 MIN: CPT | Mod: 25 | Performed by: FAMILY MEDICINE

## 2021-10-26 PROCEDURE — G0008 ADMIN INFLUENZA VIRUS VAC: HCPCS | Performed by: FAMILY MEDICINE

## 2021-10-26 PROCEDURE — 90662 IIV NO PRSV INCREASED AG IM: CPT | Performed by: FAMILY MEDICINE

## 2021-10-26 RX ORDER — FEXOFENADINE HCL 180 MG/1
180 TABLET ORAL DAILY
Qty: 90 TABLET | Refills: 1 | Status: SHIPPED | OUTPATIENT
Start: 2021-10-26 | End: 2023-04-17

## 2021-10-26 RX ORDER — FLUTICASONE PROPIONATE 50 MCG
1 SPRAY, SUSPENSION (ML) NASAL 2 TIMES DAILY
Qty: 16 G | Refills: 5 | Status: SHIPPED | OUTPATIENT
Start: 2021-10-26 | End: 2022-05-26

## 2021-10-26 RX ORDER — KETOTIFEN FUMARATE 0.25 MG/ML
1 SOLUTION/ DROPS OPHTHALMIC 2 TIMES DAILY
COMMUNITY
End: 2022-03-11

## 2021-10-26 ASSESSMENT — FIBROSIS 4 INDEX: FIB4 SCORE: 1.7

## 2021-10-26 NOTE — PATIENT INSTRUCTIONS
Irrigate your nose 2 times daily.       1 spray per nostril twice daily        Take Allegra 180 mg, 1 pill daily

## 2021-10-27 NOTE — PROGRESS NOTES
Subjective:   CC: cataract     HPI:     Ila Bangura is a 76 y.o. female, established patient of the clinic. Patient was seen on 9/28/2021 by myself. She presents today for follow up. Below is previous HPI:     Patient was diagnosed with bilateral cataract. She under went cataract surgery on the left eye on 9/7/2021. She was advised to use steroid eye drops. Unfortunately, this medication causes significant burning sensation and discomfort. She returned to Ophthalmology and was told that her cornea was accidentally scratched during the surgery. Abx eyedrops Moxifloxacin and Ketorolac 0.5% solution were prescribed. She was advised to take prednisolone 1% eyedrops, Moxifloxacin 0.5 % eyedrops, and Ketorolac 0.5 % eyedrops every 4 hours until complete the bottles. Unfortunately, she develops elevated blood pressure and acute palpitation each time she attempts to instill these medications. She called her Ophthalmologist and was advised to follow up with PCP for consultation. She was taking these eyedrops for about 10 days and has quit them about 10 days ago. Her blood pressure is slowly returning to baseline. Palpitation is no longer present. She denies any visual symptoms except for dry sensation on the left. Her eye doctors: Dr. Nascimento, Dr. Zeng.     She also complained of acute intermittent headache at the occipital area when she lies down or when she watches TV (lying down). Pain is sharp and moderate in severity. Denies fever, chills, neck pain, head trauma, focal weakness, numbness, changes in vision, nausea, vomiting, speech or swallowing problems. Negative unexplained weight loss.     Interval history:   She has followed up with Ophthalmology. She was told that her eye is healing appropriately. She was also advised to take over-the-counter anti-histamine eye drops for allergic conjunctivitis. She continues to do well and is planning to find a different Ophthalmologist to have cataract surgery for  her right eye. Except for mild left upper and lower eyelids edema, she does not have any problems with her eyes currently.     She had MRI of the brain done due to development of occipital headache. I reviewed the MRI report with patient. Except for chronic right maxillary sinusitis, there was no acute findings on MRI.        Current medicines (including changes today)  Current Outpatient Medications   Medication Sig Dispense Refill   • ketotifen (ZADITOR) 0.025 % ophthalmic solution Administer 1 Drop into both eyes 2 times a day.     • fexofenadine (ALLEGRA) 180 MG tablet Take 1 Tablet by mouth every day. 90 Tablet 1   • fluticasone (FLONASE) 50 MCG/ACT nasal spray Administer 1 Spray into affected nostril(S) 2 times a day. 16 g 5   • losartan (COZAAR) 25 MG Tab Take 1 Tablet by mouth every day. 90 Tablet 3   • ketorolac (ACULAR) 0.5 % Solution      • levalbuterol (XOPENEX HFA) 45 MCG/ACT inhaler Inhale 1-2 Puffs every four hours as needed for Shortness of Breath. 45 g 3   • omeprazole (PRILOSEC) 20 MG delayed-release capsule TAKE 1 CAPSULE DAILY 90 Capsule 1   • atorvastatin (LIPITOR) 10 MG Tab Take 1 Tab by mouth every evening. 90 Tab 3   • Calcium Carbonate Antacid (TUMS PO) Take 1,000 mg by mouth 1 time a day as needed.     • QVAR REDIHALER 40 MCG/ACT inhaler USE 2 INHALATIONS TWICE A DAY 10.6 g 11   • Multiple Vitamins-Minerals (CENTRUM SILVER ADULT 50+ PO) Take  by mouth.       No current facility-administered medications for this visit.     She  has a past medical history of Asthma, Diabetes (HCC), and GERD (gastroesophageal reflux disease).    I personally reviewed patient's problem list, allergies, medications, family hx, social hx with patient and update EPIC.     REVIEW OF SYSTEMS:  CONSTITUTIONAL:  Denies night sweats, fatigue, malaise, lethargy, fever or chills.  RESPIRATORY:  Denies cough, wheeze, hemoptysis, or shortness of breath.  CARDIOVASCULAR:  Denies chest pains, palpitations, pedal edema      Objective:     /70 (BP Location: Right arm, Patient Position: Sitting, BP Cuff Size: Adult)   Pulse 72   Temp 36.7 °C (98 °F) (Temporal)   Ht 1.524 m (5')   Wt 47.2 kg (104 lb)   SpO2 98%  Body mass index is 20.31 kg/m².    Physical Exam:  Constitutional: awake, alert, in no distress.  Skin: Warm, dry, good turgor, no rashes, bruises, ulcers in visible areas.  Eye: conjunctiva clear, lids neg for edema or lesions.  ENMT: TM and auditory canals wnl.   Neck: Trachea midline, no masses, no thyromegaly. No cervical or supraclavicular lymphadenopathy  Respiratory: Unlabored respiratory effort, lungs clear to auscultation, no wheezes, no rales.  Cardiovascular: Normal S1, S2, no murmur, no pedal edema.  Psych: Oriented x3, affect and mood wnl, intact judgement and insight.       Assessment and Plan:   The following treatment plan was discussed    1. Allergic conjunctivitis of both eyes  - continue over-the-counter anti-histamine eyedrops    2. Essential hypertension  Chronic, controlled with Losartan 25 mg, no s/e reported, will continue.    - CBC WITH DIFFERENTIAL; Future  - Comp Metabolic Panel; Future  - MICROALBUMIN CREAT RATIO URINE; Future    3. Age-related cataract of both eyes, unspecified age-related cataract type  S/p left eye surgery in 9/2021  Will have the right eye done with Ophthalmologist in the [place] area.     4. Maxillary sinusitis, chronic  Incidental findings of chronic right maxillary sinusitis on brain MRI.   She is asymptomatic except for rhinorrhea and congestion.   - nasal saline irrgatin  - fexofenadine (ALLEGRA) 180 MG tablet; Take 1 Tablet by mouth every day.  Dispense: 90 Tablet; Refill: 1  - fluticasone (FLONASE) 50 MCG/ACT nasal spray; Administer 1 Spray into affected nostril(S) 2 times a day.  Dispense: 16 g; Refill: 5    5. Need for vaccination  - Influenza Vaccine, High Dose (65+ Only)    Total time spent reviewing pt's chart, labs, notes, imaging, and counseling/prescribing  medications to patient before, during, and after the visit: 40 minutes.        Erin Treviño M.D.      Followup: Return in about 3 months (around 1/26/2022) for Multiple issues.    Please note that this dictation was created using voice recognition software. I have made every reasonable attempt to correct obvious errors, but I expect that there are errors of grammar and possibly content that I did not discover before finalizing the note.

## 2021-12-21 ENCOUNTER — HOSPITAL ENCOUNTER (OUTPATIENT)
Dept: RADIOLOGY | Facility: MEDICAL CENTER | Age: 76
End: 2021-12-21
Attending: FAMILY MEDICINE
Payer: MEDICARE

## 2021-12-21 DIAGNOSIS — Z12.31 ENCOUNTER FOR MAMMOGRAM TO ESTABLISH BASELINE MAMMOGRAM: ICD-10-CM

## 2021-12-21 PROCEDURE — 77063 BREAST TOMOSYNTHESIS BI: CPT

## 2022-01-27 DIAGNOSIS — J45.30 MILD PERSISTENT ASTHMA WITHOUT COMPLICATION: ICD-10-CM

## 2022-01-30 DIAGNOSIS — E78.5 DYSLIPIDEMIA: ICD-10-CM

## 2022-01-31 RX ORDER — BECLOMETHASONE DIPROPIONATE HFA 40 UG/1
AEROSOL, METERED RESPIRATORY (INHALATION)
Qty: 10.6 G | Refills: 3 | Status: SHIPPED | OUTPATIENT
Start: 2022-01-31 | End: 2023-01-09 | Stop reason: SDUPTHER

## 2022-01-31 RX ORDER — ATORVASTATIN CALCIUM 10 MG/1
TABLET, FILM COATED ORAL
Qty: 90 TABLET | Refills: 1 | Status: SHIPPED | OUTPATIENT
Start: 2022-01-31 | End: 2022-09-06 | Stop reason: SDUPTHER

## 2022-02-01 ENCOUNTER — HOSPITAL ENCOUNTER (OUTPATIENT)
Dept: LAB | Facility: MEDICAL CENTER | Age: 77
End: 2022-02-01
Attending: FAMILY MEDICINE
Payer: MEDICARE

## 2022-02-01 DIAGNOSIS — E78.5 DYSLIPIDEMIA: ICD-10-CM

## 2022-02-01 DIAGNOSIS — I10 ESSENTIAL HYPERTENSION: ICD-10-CM

## 2022-02-01 DIAGNOSIS — E11.9 TYPE 2 DIABETES MELLITUS WITHOUT COMPLICATION, WITHOUT LONG-TERM CURRENT USE OF INSULIN (HCC): ICD-10-CM

## 2022-02-01 LAB
ALBUMIN SERPL BCP-MCNC: 4.8 G/DL (ref 3.2–4.9)
ALBUMIN/GLOB SERPL: 1.4 G/DL
ALP SERPL-CCNC: 81 U/L (ref 30–99)
ALT SERPL-CCNC: 17 U/L (ref 2–50)
ANION GAP SERPL CALC-SCNC: 13 MMOL/L (ref 7–16)
AST SERPL-CCNC: 21 U/L (ref 12–45)
BASOPHILS # BLD AUTO: 1 % (ref 0–1.8)
BASOPHILS # BLD: 0.06 K/UL (ref 0–0.12)
BILIRUB SERPL-MCNC: 0.6 MG/DL (ref 0.1–1.5)
BUN SERPL-MCNC: 18 MG/DL (ref 8–22)
CALCIUM SERPL-MCNC: 10.2 MG/DL (ref 8.5–10.5)
CHLORIDE SERPL-SCNC: 106 MMOL/L (ref 96–112)
CHOLEST SERPL-MCNC: 193 MG/DL (ref 100–199)
CO2 SERPL-SCNC: 23 MMOL/L (ref 20–33)
CREAT SERPL-MCNC: 0.57 MG/DL (ref 0.5–1.4)
CREAT UR-MCNC: 77.97 MG/DL
EOSINOPHIL # BLD AUTO: 0.13 K/UL (ref 0–0.51)
EOSINOPHIL NFR BLD: 2.2 % (ref 0–6.9)
ERYTHROCYTE [DISTWIDTH] IN BLOOD BY AUTOMATED COUNT: 39.6 FL (ref 35.9–50)
EST. AVERAGE GLUCOSE BLD GHB EST-MCNC: 126 MG/DL
FASTING STATUS PATIENT QL REPORTED: NORMAL
GLOBULIN SER CALC-MCNC: 3.4 G/DL (ref 1.9–3.5)
GLUCOSE SERPL-MCNC: 105 MG/DL (ref 65–99)
HBA1C MFR BLD: 6 % (ref 4–5.6)
HCT VFR BLD AUTO: 47.2 % (ref 37–47)
HDLC SERPL-MCNC: 73 MG/DL
HGB BLD-MCNC: 15.7 G/DL (ref 12–16)
IMM GRANULOCYTES # BLD AUTO: 0.02 K/UL (ref 0–0.11)
IMM GRANULOCYTES NFR BLD AUTO: 0.3 % (ref 0–0.9)
LDLC SERPL CALC-MCNC: 99 MG/DL
LYMPHOCYTES # BLD AUTO: 1.97 K/UL (ref 1–4.8)
LYMPHOCYTES NFR BLD: 33.7 % (ref 22–41)
MCH RBC QN AUTO: 31.5 PG (ref 27–33)
MCHC RBC AUTO-ENTMCNC: 33.3 G/DL (ref 33.6–35)
MCV RBC AUTO: 94.8 FL (ref 81.4–97.8)
MICROALBUMIN UR-MCNC: <1.2 MG/DL
MICROALBUMIN/CREAT UR: NORMAL MG/G (ref 0–30)
MONOCYTES # BLD AUTO: 0.43 K/UL (ref 0–0.85)
MONOCYTES NFR BLD AUTO: 7.4 % (ref 0–13.4)
NEUTROPHILS # BLD AUTO: 3.24 K/UL (ref 2–7.15)
NEUTROPHILS NFR BLD: 55.4 % (ref 44–72)
NRBC # BLD AUTO: 0 K/UL
NRBC BLD-RTO: 0 /100 WBC
PLATELET # BLD AUTO: 254 K/UL (ref 164–446)
PMV BLD AUTO: 9.7 FL (ref 9–12.9)
POTASSIUM SERPL-SCNC: 4.9 MMOL/L (ref 3.6–5.5)
PROT SERPL-MCNC: 8.2 G/DL (ref 6–8.2)
RBC # BLD AUTO: 4.98 M/UL (ref 4.2–5.4)
SODIUM SERPL-SCNC: 142 MMOL/L (ref 135–145)
TRIGL SERPL-MCNC: 105 MG/DL (ref 0–149)
WBC # BLD AUTO: 5.9 K/UL (ref 4.8–10.8)

## 2022-02-01 PROCEDURE — 82043 UR ALBUMIN QUANTITATIVE: CPT

## 2022-02-01 PROCEDURE — 36415 COLL VENOUS BLD VENIPUNCTURE: CPT

## 2022-02-01 PROCEDURE — 82570 ASSAY OF URINE CREATININE: CPT

## 2022-02-01 PROCEDURE — 80053 COMPREHEN METABOLIC PANEL: CPT

## 2022-02-01 PROCEDURE — 85025 COMPLETE CBC W/AUTO DIFF WBC: CPT

## 2022-02-01 PROCEDURE — 83036 HEMOGLOBIN GLYCOSYLATED A1C: CPT | Mod: GA

## 2022-02-01 PROCEDURE — 80061 LIPID PANEL: CPT

## 2022-02-08 ENCOUNTER — APPOINTMENT (OUTPATIENT)
Dept: MEDICAL GROUP | Age: 77
End: 2022-02-08
Payer: MEDICARE

## 2022-03-11 ENCOUNTER — OFFICE VISIT (OUTPATIENT)
Dept: MEDICAL GROUP | Age: 77
End: 2022-03-11
Payer: MEDICARE

## 2022-03-11 VITALS
BODY MASS INDEX: 18.85 KG/M2 | TEMPERATURE: 98.2 F | SYSTOLIC BLOOD PRESSURE: 120 MMHG | HEIGHT: 60 IN | OXYGEN SATURATION: 95 % | WEIGHT: 96 LBS | DIASTOLIC BLOOD PRESSURE: 74 MMHG | HEART RATE: 84 BPM

## 2022-03-11 DIAGNOSIS — I10 ESSENTIAL HYPERTENSION: ICD-10-CM

## 2022-03-11 DIAGNOSIS — K21.9 GASTROESOPHAGEAL REFLUX DISEASE WITHOUT ESOPHAGITIS: ICD-10-CM

## 2022-03-11 DIAGNOSIS — J45.30 MILD PERSISTENT ASTHMA WITHOUT COMPLICATION: ICD-10-CM

## 2022-03-11 DIAGNOSIS — Z00.00 MEDICARE ANNUAL WELLNESS VISIT, SUBSEQUENT: Primary | ICD-10-CM

## 2022-03-11 DIAGNOSIS — Z78.0 ASYMPTOMATIC MENOPAUSAL STATE: ICD-10-CM

## 2022-03-11 DIAGNOSIS — N81.4 CYSTOCELE WITH UTERINE PROLAPSE: ICD-10-CM

## 2022-03-11 DIAGNOSIS — R73.03 PREDIABETES: ICD-10-CM

## 2022-03-11 DIAGNOSIS — Z80.0 FHX: COLON CANCER: ICD-10-CM

## 2022-03-11 DIAGNOSIS — E78.5 DYSLIPIDEMIA: ICD-10-CM

## 2022-03-11 DIAGNOSIS — H25.9 AGE-RELATED CATARACT OF BOTH EYES, UNSPECIFIED AGE-RELATED CATARACT TYPE: ICD-10-CM

## 2022-03-11 PROBLEM — N95.2 ATROPHIC VAGINITIS: Status: RESOLVED | Noted: 2020-01-17 | Resolved: 2022-03-11

## 2022-03-11 PROCEDURE — G0439 PPPS, SUBSEQ VISIT: HCPCS | Performed by: FAMILY MEDICINE

## 2022-03-11 ASSESSMENT — FIBROSIS 4 INDEX: FIB4 SCORE: 1.54

## 2022-03-11 ASSESSMENT — ACTIVITIES OF DAILY LIVING (ADL): BATHING_REQUIRES_ASSISTANCE: 0

## 2022-03-11 ASSESSMENT — PATIENT HEALTH QUESTIONNAIRE - PHQ9: CLINICAL INTERPRETATION OF PHQ2 SCORE: 0

## 2022-03-11 ASSESSMENT — ENCOUNTER SYMPTOMS: GENERAL WELL-BEING: GOOD

## 2022-03-11 NOTE — LETTER
[facility]  Erin Treviño M.D.  [address] [place] [address]  Fax: [phone]   Authorization for Release/Disclosure of   Protected Health Information   Name: TERRELL MULLER : 1945 SSN: [id number]   Address: [address] [place] [address] [place]  [facility] [address] Phone:    [phone] (home)    I authorize the entity listed below to release/disclose the PHI below to:   [facility]rin Trevi[facility] M.D[facility] and Erin Treviño M.D.   Provider or Entity Name:  Dr. Beth   Address   City, State, Zip   Phone:      Fax:  [phone]   Reason for request: continuity of care   Information to be released:    [  ] LAST COLONOSCOPY,  including any PATH REPORT and follow-up  [  ] LAST FIT/COLOGUARD RESULT [  ] LAST DEXA  [  ] LAST MAMMOGRAM  [  ] LAST PAP  [  ] LAST LABS [  ] RETINA EXAM REPORT  [  ] IMMUNIZATION RECORDS  [ xxx ] Release all info      [  ] Check here and initial the line next to each item to release ALL health information INCLUDING  _____ Care and treatment for drug and / or alcohol abuse  _____ HIV testing, infection status, or AIDS  _____ Genetic Testing    DATES OF SERVICE OR TIME PERIOD TO BE DISCLOSED: _____________  I understand and acknowledge that:  * This Authorization may be revoked at any time by you in writing, except if your health information has already been used or disclosed.  * Your health information that will be used or disclosed as a result of you signing this authorization could be re-disclosed by the recipient. If this occurs, your re-disclosed health information may no longer be protected by State or Federal laws.  * You may refuse to sign this Authorization. Your refusal will not affect your ability to obtain treatment.  * This Authorization becomes effective upon signing and will  on (date) __________.      If no date is indicated, this Authorization will  one (1) year from the signature date.    Name: Terrell Muller    Signature:   Date:     3/11/2022       PLEASE FAX  REQUESTED RECORDS BACK TO: [phone]

## 2022-03-11 NOTE — PROGRESS NOTES
Chief Complaint   Patient presents with   • Medicare Annual Wellness       HPI:  Ila is a 77 y.o. here for Medicare Annual Wellness Visit    Patient had cataract extraction of the left eye in September 2021 by Dr. Beth.  Patient complains of mild postop left upper eyelid edema.  Patient had multiple follow-up with Dr. Anderson since.  Unfortunately, her symptoms were persistent.  She is dissatisfied with the service provided by Dr. Beth and would like to be referred to a different ophthalmologist to discuss cataract extraction of the right eye.  She denies any visual changes, eye pain, visual loss.  She uses over-the-counter moisturizing eyedrop as needed for dry eyes.  Patient has been losing weight over the past few months due to the stress associated with her left eye following the surgery.    She is taking all medication as directed.  She tolerates them well, no side effects reported.  Her asthma is controlled.     Patient Active Problem List    Diagnosis Date Noted   • Age-related cataract of both eyes 10/26/2021   • Ganglion cyst of both hands 02/01/2021   • FHx: colon cancer 01/17/2020   • Cystocele with uterine prolapse_OBGYN 12/11/2019   • Type 2 diabetes mellitus without complication, without long-term current use of insulin_diet control 10/10/2016   • Essential hypertension 10/09/2016   • Dyslipidemia 10/09/2016   • Mild persistent asthma without complication 10/09/2016   • Gastroesophageal reflux disease without esophagitis 10/09/2016       Current Outpatient Medications   Medication Sig Dispense Refill   • QVAR REDIHALER 40 MCG/ACT inhaler USE 2 INHALATIONS TWICE A DAY 10.6 g 3   • atorvastatin (LIPITOR) 10 MG Tab TAKE 1 TABLET EVERY EVENING 90 Tablet 1   • fexofenadine (ALLEGRA) 180 MG tablet Take 1 Tablet by mouth every day. 90 Tablet 1   • fluticasone (FLONASE) 50 MCG/ACT nasal spray Administer 1 Spray into affected nostril(S) 2 times a day. 16 g 5   • losartan (COZAAR) 25 MG Tab Take 1  Tablet by mouth every day. 90 Tablet 3   • levalbuterol (XOPENEX HFA) 45 MCG/ACT inhaler Inhale 1-2 Puffs every four hours as needed for Shortness of Breath. 45 g 3   • omeprazole (PRILOSEC) 20 MG delayed-release capsule TAKE 1 CAPSULE DAILY 90 Capsule 1   • Calcium Carbonate Antacid (TUMS PO) Take 1,000 mg by mouth 1 time a day as needed.     • Multiple Vitamins-Minerals (CENTRUM SILVER ADULT 50+ PO) Take  by mouth.       No current facility-administered medications for this visit.        Patient is taking medications as noted in medication list.  Current supplements as per medication list.     Allergies: Augmentin, Aspirin, and Amoxicillin-pot clavulanate    Current social contact/activities: pt states none     Is patient current with immunizations? Yes.    She  reports that she has never smoked. She has never used smokeless tobacco. She reports that she does not drink alcohol and does not use drugs.  Counseling given: Not Answered      DPA/Advanced directive: Patient has Advanced Directive, but it is not on file. Instructed to bring in a copy to scan into their chart.    ROS:    Gait: Uses no assistive device   Ostomy: No   Other tubes: No   Amputations: No   Chronic oxygen use No   Last eye exam last week   Wears hearing aids: No   : Denies any urinary leakage during the last 6 months    Screening:    Depression Screening  Little interest or pleasure in doing things?  0 - not at all  Feeling down, depressed, or hopeless? 0 - not at all      Screening for Cognitive Impairment  Three Minute Recall (daughter, heaven, mountain)  2/3    Draw clock face with all 12 numbers and set the hands to show 10 past 11.  Yes      Fall Risk Assessment  Has the patient had two or more falls in the last year or any fall with injury in the last year?  Yes    Safety Assessment  Throw rugs on floor.  No  Handrails on all stairs.  Yes  Good lighting in all hallways.  Yes  Difficulty hearing.  No  Patient counseled about all safety  risks that were identified.    Functional Assessment ADLs  Are there any barriers preventing you from cooking for yourself or meeting nutritional needs?  No.    Are there any barriers preventing you from driving safely or obtaining transportation?  No.    Are there any barriers preventing you from using a telephone or calling for help?  No.    Are there any barriers preventing you from shopping?  No.    Are there any barriers preventing you from taking care of your own finances?  No.    Are there any barriers preventing you from managing your medications?    No.    Are there any barriers preventing you from showering, bathing or dressing yourself?  No.    Are you currently engaging in any exercise or physical activity?  No.     What is your perception of your health?  Good.    Health Maintenance Summary          Overdue - RETINAL SCREENING (Yearly) Overdue - never done    No completion history exists for this topic.          Ordered - BONE DENSITY (Every 5 Years) Ordered on 3/11/2022    05/03/2017  DS-BONE DENSITY STUDY (DEXA)          DIABETES MONOFILAMENT / LE EXAM (Yearly) Next due on 5/11/2022 05/11/2021  Diabetic Monofilament Lower Extremity Exam    01/17/2020  [employer]: WORKFLOW - DIABETES - DIABETIC FOOT EXAM PERFORMED    01/17/2020  Diabetic Monofilament Lower Extremity Exam          Ordered - A1C SCREENING (Every 6 Months) Ordered on 3/11/2022    02/01/2022  HEMOGLOBIN A1C    01/26/2021  HEMOGLOBIN A1C    07/23/2020  HEMOGLOBIN A1C    04/15/2020  HEMOGLOBIN A1C    12/02/2019  HEMOGLOBIN A1C    Only the first 5 history entries have been loaded, but more history exists.          COLORECTAL CANCER SCREENING (COLONOSCOPY - Every 7 Years) Next due on 10/13/2022    10/13/2015  REFERRAL TO GI FOR COLONOSCOPY          MAMMOGRAM (Yearly) Next due on 12/21/2022 12/21/2021  MA-SCREENING MAMMO BILAT W/TOMOSYNTHESIS W/CAD    12/17/2020  MA-SCREENING MAMMO BILAT W/TOMOSYNTHESIS W/CAD    12/07/2019  MA-SCREENING  MAMMO BILAT W/TOMOSYNTHESIS W/CAD    11/27/2018  MA-SCREENING MAMMO BILAT W/TOMOSYNTHESIS W/CAD    11/22/2017  MA-SCREEN MAMMO W/CAD-BILAT    Only the first 5 history entries have been loaded, but more history exists.          FASTING LIPID PROFILE (Yearly) Next due on 2/1/2023 02/01/2022  Lipid Profile    01/26/2021  Lipid Profile    04/15/2020  Lipid Profile    12/02/2019  Lipid Profile    05/18/2019  Lipid Profile    Only the first 5 history entries have been loaded, but more history exists.          URINE ACR / MICROALBUMIN (Yearly) Next due on 2/1/2023 02/01/2022  MICROALBUMIN CREAT RATIO URINE    01/26/2021  MICROALBUMIN CREAT RATIO URINE    04/15/2020  MICROALBUMIN CREAT RATIO URINE          Ordered - SERUM CREATININE (Yearly) Ordered on 3/11/2022    02/01/2022  Comp Metabolic Panel    01/26/2021  Comp Metabolic Panel    04/15/2020  Comp Metabolic Panel    12/02/2019  Comp Metabolic Panel    05/18/2019  COMP METABOLIC PANEL    Only the first 5 history entries have been loaded, but more history exists.          Annual Wellness Visit (Every 366 Days) Next due on 3/12/2023    03/11/2022  Visit Dx: Medicare annual wellness visit, subsequent    03/11/2022  Level of Service: ANNUAL WELLNESS VISIT-INCLUDES PPPS SUBSEQUE*    02/01/2021  Subsequent Annual Wellness Visit - Includes PPPS ()    02/01/2021  Visit Dx: Medicare annual wellness visit, subsequent          IMM DTaP/Tdap/Td Vaccine (3 - Td or Tdap) Next due on 7/20/2029 07/20/2019  Imm Admin: Tdap Vaccine    06/24/2013  Imm Admin: Tdap Vaccine          IMM PNEUMOCOCCAL VACCINE: 65+ Years (Series Information) Completed    03/25/2016  Imm Admin: Pneumococcal Conjugate Vaccine (Prevnar/PCV-13)    07/13/2015  Imm Admin: Pneumococcal Conjugate Vaccine (Prevnar/PCV-13)    03/25/2015  Imm Admin: Pneumococcal polysaccharide vaccine (PPSV-23)    02/13/2012  Imm Admin: Pneumococcal polysaccharide vaccine (PPSV-23)          HEPATITIS C SCREENING  Completed     12/02/2019  HEP C VIRUS ANTIBODY          IMM ZOSTER VACCINES (Series Information) Completed    12/18/2019  Imm Admin: Zoster Vaccine Recombinant (RZV) (SHINGRIX)    07/20/2019  Imm Admin: Zoster Vaccine Recombinant (RZV) (SHINGRIX)    10/25/2014  Imm Admin: Zoster Vaccine Live (ZVL) (Zostavax) - HISTORICAL DATA    10/15/2008  Imm Admin: Zoster Vaccine Live (ZVL) (Zostavax) - HISTORICAL DATA          IMM HEP B VACCINE (Series Information) Aged Out    01/17/2020  Imm Admin: Hepatitis B Vaccine (Adol/Adult)          IMM INFLUENZA (Series Information) Completed    10/26/2021  Imm Admin: Influenza Vaccine Adult HD    10/05/2020  Imm Admin: Influenza Vaccine Adult HD    12/11/2019  Imm Admin: Influenza Vaccine Adult HD    11/20/2018  Imm Admin: Influenza Vaccine Adult HD    10/25/2017  Imm Admin: Influenza Vaccine Adult HD    Only the first 5 history entries have been loaded, but more history exists.          COVID-19 Vaccine (Series Information) Completed    12/06/2021  Imm Admin: Moderna SARS-CoV-2 Vaccine    03/13/2021  Imm Admin: Moderna SARS-CoV-2 Vaccine    02/13/2021  Imm Admin: Moderna SARS-CoV-2 Vaccine          IMM MENINGOCOCCAL VACCINE (MCV4) (Series Information) Aged Out    No completion history exists for this topic.          Discontinued - PAP SMEAR  Discontinued    No completion history exists for this topic.                Patient Care Team:  Erni Treviño M.D. as PCP - General (Family Medicine)  Mateus Garcia III, M.D. as Consulting Physician (Gastroenterology)  Kingston Grider M.D. (Allergy and Immunology)  Michael Hillman M.D. (Otolaryngology)  Liam Nascimento M.D. (Ophthalmology)    Social History     Tobacco Use   • Smoking status: Never Smoker   • Smokeless tobacco: Never Used   Vaping Use   • Vaping Use: Never used   Substance Use Topics   • Alcohol use: No   • Drug use: No     Family History   Problem Relation Age of Onset   • Other Mother         Intestinal Problem   • Diabetes Mother    •  Cancer Father         Stomach Cancer   • Lung Disease Father         Smoker   • Cancer Sister         breast cancer/mastectomy   • Diabetes Sister    • Diabetes Brother    • Cancer Maternal Grandmother         bone cancer   • No Known Problems Maternal Grandfather    • Diabetes Brother    • No Known Problems Sister    • No Known Problems Paternal Grandmother    • No Known Problems Paternal Grandfather      She  has a past medical history of Asthma, Diabetes (HCC), and GERD (gastroesophageal reflux disease).   Past Surgical History:   Procedure Laterality Date   • DENTAL SURGERY  2016    bone graft   • COLONOSCOPY      negative   • UPPER OR LOWER GI PROCEDURE WITH ANESTHESIA      x 2 upper GI         Exam:   /74 (BP Location: Right arm, Patient Position: Sitting, BP Cuff Size: Adult)   Pulse 84   Temp 36.8 °C (98.2 °F) (Temporal)   Ht 1.524 m (5')   Wt 43.5 kg (96 lb)   SpO2 95%  Body mass index is 18.75 kg/m².    Hearing excellent.    Dentition good  Alert, oriented in no acute distress  Eye contact is good, speech goal directed, affect calm    Assessment and Plan. The following treatment and monitoring plan is recommended:      1. Medicare annual wellness visit, subsequent  General health and wellness counseling provided.      Vaccines up-to-date    2. Age-related cataract of both eyes, unspecified age-related cataract type  S/p cataract extraction in the left eye in September 2021.   Patient needs cataract extraction on the right eye, but would like to be referred to a different ophthalmologist for consultation.  - Referral to Ophthalmology    3. Cystocele with uterine prolapse_OBGYN  Chronic, mild symptoms, not bothersome.  Patient is not interested in surgery.  We will continue to monitor    4. Dyslipidemia  Chronic, controlled with Lipitor 10 mg daily, no s/e reported, will continue.      5. Essential hypertension  Chronic, controlled with losartan 25 mg daily, no s/e reported, will continue.      6.  Gastroesophageal reflux disease without esophagitis  Chronic, taking 20 mg of omeprazole as needed for symptoms.  -Continue omeprazole 20 mg daily as needed  -Dietary modification    7. Prediabetes  Chronic, diet controlled, last A1c was 6.0.  - Dietary/lifestyle modification   - CBC WITH DIFFERENTIAL; Future  - Comp Metabolic Panel; Future  - HEMOGLOBIN A1C; Future    8. Mild persistent asthma without complication  Controlled with Qvar and Xopenex as needed  We will continue to monitor    9. Asymptomatic menopausal state  - DS-BONE DENSITY STUDY (DEXA); Future    10. FHx: colon cancer  Patient is due for colonoscopy this year.  She is planning to do colonoscopy in [place].  She will have records sent to the office after the procedure.    Services suggested: No services needed at this time  Health Care Screening recommendations as per orders if indicated.  Referrals offered: PT/OT/Nutrition counseling/Behavioral Health/Smoking cessation as per orders if indicated.    Discussion today about general wellness and lifestyle habits:    · Prevent falls and reduce trip hazards; Cautioned about securing or removing rugs.  · Have a working fire alarm and carbon monoxide detector;   · Engage in regular physical activity and social activities.     Follow-up: Return in about 6 months (around 9/11/2022) for Diabetes.

## 2022-04-18 ENCOUNTER — HOSPITAL ENCOUNTER (OUTPATIENT)
Dept: RADIOLOGY | Facility: MEDICAL CENTER | Age: 77
End: 2022-04-18
Attending: FAMILY MEDICINE
Payer: MEDICARE

## 2022-04-18 DIAGNOSIS — Z78.0 ASYMPTOMATIC MENOPAUSAL STATE: ICD-10-CM

## 2022-04-18 PROBLEM — M85.89 OSTEOPENIA OF MULTIPLE SITES: Status: ACTIVE | Noted: 2022-04-18

## 2022-04-18 PROCEDURE — 77080 DXA BONE DENSITY AXIAL: CPT

## 2022-05-26 ENCOUNTER — OFFICE VISIT (OUTPATIENT)
Dept: MEDICAL GROUP | Age: 77
End: 2022-05-26
Payer: MEDICARE

## 2022-05-26 VITALS
SYSTOLIC BLOOD PRESSURE: 120 MMHG | HEIGHT: 60 IN | DIASTOLIC BLOOD PRESSURE: 70 MMHG | WEIGHT: 93.8 LBS | RESPIRATION RATE: 15 BRPM | OXYGEN SATURATION: 96 % | HEART RATE: 68 BPM | BODY MASS INDEX: 18.42 KG/M2 | TEMPERATURE: 99.1 F

## 2022-05-26 DIAGNOSIS — F41.1 GAD (GENERALIZED ANXIETY DISORDER): ICD-10-CM

## 2022-05-26 DIAGNOSIS — K21.9 GASTROESOPHAGEAL REFLUX DISEASE WITHOUT ESOPHAGITIS: ICD-10-CM

## 2022-05-26 DIAGNOSIS — R10.13 EPIGASTRIC PAIN: ICD-10-CM

## 2022-05-26 PROCEDURE — 99215 OFFICE O/P EST HI 40 MIN: CPT | Performed by: FAMILY MEDICINE

## 2022-05-26 RX ORDER — CITALOPRAM HYDROBROMIDE 10 MG/1
10 TABLET ORAL DAILY
Qty: 90 TABLET | Refills: 3 | Status: SHIPPED | OUTPATIENT
Start: 2022-05-26 | End: 2022-06-27

## 2022-05-26 RX ORDER — OMEPRAZOLE 20 MG/1
CAPSULE, DELAYED RELEASE ORAL
Qty: 90 CAPSULE | Refills: 1 | Status: SHIPPED | OUTPATIENT
Start: 2022-05-26 | End: 2022-06-14 | Stop reason: SINTOL

## 2022-05-26 RX ORDER — ERYTHROMYCIN 5 MG/G
OINTMENT OPHTHALMIC
COMMUNITY
Start: 2022-04-04 | End: 2022-05-26

## 2022-05-26 ASSESSMENT — FIBROSIS 4 INDEX: FIB4 SCORE: 1.54

## 2022-05-26 NOTE — LETTER
[facility]  Erin Treviño M.D.  [address] [place] [address]  Fax: [phone]   Authorization for Release/Disclosure of   Protected Health Information   Name: TERRELL MULLER : 1945 SSN: [id number]   Address: [address] [place] [id number] Phone:    [phone] (home)    I authorize the entity listed below to release/disclose the PHI below to:   [facility]/Erin Treviño M.D. and Erin Treviño M.D.   Provider or Entity Name: Dr. Garcia, GI Specialist     Address  [facility]   Phone:[phone]      Fax:[phone]     Reason for request: continuity of care   Information to be released: Colonoscopy and EGD Results    [X  ] LAST COLONOSCOPY,  including any PATH REPORT and follow-up  [  ] LAST FIT/COLOGUARD RESULT [  ] LAST DEXA  [  ] LAST MAMMOGRAM  [  ] LAST PAP  [  ] LAST LABS [  ] RETINA EXAM REPORT  [  ] IMMUNIZATION RECORDS  [  ] Release all info      [  ] Check here and initial the line next to each item to release ALL health information INCLUDING  _____ Care and treatment for drug and / or alcohol abuse  _____ HIV testing, infection status, or AIDS  _____ Genetic Testing    DATES OF SERVICE OR TIME PERIOD TO BE DISCLOSED: _____________  I understand and acknowledge that:  * This Authorization may be revoked at any time by you in writing, except if your health information has already been used or disclosed.  * Your health information that will be used or disclosed as a result of you signing this authorization could be re-disclosed by the recipient. If this occurs, your re-disclosed health information may no longer be protected by State or Federal laws.  * You may refuse to sign this Authorization. Your refusal will not affect your ability to obtain treatment.  * This Authorization becomes effective upon signing and will  on (date) __________.      If no date is indicated, this Authorization will  one (1) year from the signature date.    Name: Terrell Kemp  Willem    Signature:   Date:     5/26/2022       PLEASE FAX REQUESTED RECORDS BACK TO: [phone]

## 2022-05-27 NOTE — PROGRESS NOTES
Subjective:   CC: abdominal pain following EGD and colonoscopy    HPI:     Ila Bangura is a 77 y.o. female, established patient of the clinic.     Patient has history of mild GERD. She is taking Omerpazole 20 mg qd PRN. She recently underwent EGD and colonoscopy in [place] with Dr. Garcia. 24 hours following the procedures, she complained of severe epigastric abdominal pain, worse with eating. This leads to severe appetite suppression and acute weight loss. Negative fever, chills, hematochezia, melena. She does have intermittent mild nausea, but no vomiting. . She contacted Dr. Garcia's office following the procedures and was advised to go to ER. However, she did not go to ER. She returned to [place] a few days ago and has been trying to control her symptoms with small meals and modification of foods. Her symptoms appear to have improved. She is slowly gaining weight. She continues to denies nausea, vomiting, hematochezia, melena, nausea, vomiting, fever, chills. She was told that colonoscopy was normal. Her EGD showed mild gastritis. Her  states that she is extremely stress because of health concerns associated with her son and grandson. Her  complained of patient's severe anxiety and excessive worries about everything. It is difficult for him to care for her because of her anxiety. She endorses excessive worries about things all her life. She also complained of poor sleep because of anxiety leading to daytime tiredness, poor appetite, and mood problems. She also appears to be more forgetful and tends to dwell on unhappy events of the past.     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 Each point of dilatation held for 1 minute. Mild self limited bleeding with dilating to 15 mm. Biopsies taken of the stricture; Small hiatal hernia with irregular Z line.    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     EGD in 5/2022: negative  biopsies of the esophagus and the stomach    Colonoscopy 2015: Adenoma x 1    Colonoscopy 5/2022: awaiting records from Dr. Garcia, patient was told that it was normal      Current medicines (including changes today)  Current Outpatient Medications   Medication Sig Dispense Refill   • omeprazole (PRILOSEC) 20 MG delayed-release capsule TAKE 1 CAPSULE DAILY 90 Capsule 1   • citalopram (CELEXA) 10 MG tablet Take 1 Tablet by mouth every day. 90 Tablet 3   • QVAR REDIHALER 40 MCG/ACT inhaler USE 2 INHALATIONS TWICE A DAY 10.6 g 3   • atorvastatin (LIPITOR) 10 MG Tab TAKE 1 TABLET EVERY EVENING 90 Tablet 1   • fexofenadine (ALLEGRA) 180 MG tablet Take 1 Tablet by mouth every day. 90 Tablet 1   • losartan (COZAAR) 25 MG Tab Take 1 Tablet by mouth every day. 90 Tablet 3   • levalbuterol (XOPENEX HFA) 45 MCG/ACT inhaler Inhale 1-2 Puffs every four hours as needed for Shortness of Breath. 45 g 3   • Calcium Carbonate Antacid (TUMS PO) Take 1,000 mg by mouth 1 time a day as needed.     • Multiple Vitamins-Minerals (CENTRUM SILVER ADULT 50+ PO) Take  by mouth.       No current facility-administered medications for this visit.     She  has a past medical history of Asthma, Diabetes (HCC), and GERD (gastroesophageal reflux disease).    I reviewed patient's problem list, allergies, medications, family hx, social hx with patient and update EPIC.        Objective:     /70 (BP Location: Right arm, Patient Position: Sitting, BP Cuff Size: Adult)   Pulse 68   Temp 37.3 °C (99.1 °F) (Temporal)   Resp 15   Ht 1.524 m (5')   Wt 42.5 kg (93 lb 12.8 oz)   SpO2 96%  Body mass index is 18.32 kg/m².    Physical Exam:  Constitutional: awake, alert, in no distress.  Skin: Warm, dry, good turgor, no rashes, bruises, ulcers in visible areas.  Eye: conjunctiva clear, lids neg for edema or lesions.  Neck: Trachea midline, no masses, no thyromegaly. No cervical or supraclavicular lymphadenopathy  Respiratory: Unlabored respiratory effort,  lungs clear to auscultation, no wheezes, no rales.  Cardiovascular: Normal S1, S2, no murmur, no pedal edema.  Abdomen: Soft, non-tender to palpation, active BS, no hernia, no hepatosplenomegaly, negative rebound or guarding.   Psych: Oriented x3, affect and mood wnl, intact judgement and insight.       Assessment and Plan:   The following treatment plan was discussed    1. Epigastric pain following EGD   2. Gastroesophageal reflux disease without esophagitis  78 yo with history of GERD, Araya's esophagus per previous EGD in [place] who recently underwent repeat EGD and colonoscopy with Dr. Garcia in [place]. She experiences severe epigastric abdominal pain following the procedures. However, symptoms are improving. She is able to tolerate oral diet without nausea, vomiting, hematochezia, melena, fever, chills. She gains weight and feels generally well.   - omeprazole (PRILOSEC) 20 MG delayed-release capsule; TAKE 1 CAPSULE DAILY  Dispense: 90 Capsule; Refill: 1  - dietary modification  - ER precautions discussed.     3. YASH (generalized anxiety disorder)  History and exam are concerning for chronic YASH  - citalopram (CELEXA) 10 MG tablet; Take 1 Tablet by mouth every day.  Dispense: 90 Tablet; Refill: 3  - continue yoga, regular exercises  - follow up in 3 months     Total time spent reviewing pt's chart, labs, notes, imaging, and counseling/prescribing medications to patient before, during, and after the visit: 40 minutes.      Erin Treviño M.D.      Followup: Return in about 3 months (around 8/26/2022) for Mental Health F/U.    Please note that this dictation was created using voice recognition software. I have made every reasonable attempt to correct obvious errors, but I expect that there are errors of grammar and possibly content that I did not discover before finalizing the note.

## 2022-06-11 ENCOUNTER — PATIENT MESSAGE (OUTPATIENT)
Dept: MEDICAL GROUP | Age: 77
End: 2022-06-11
Payer: MEDICARE

## 2022-06-13 NOTE — PATIENT COMMUNICATION
Phone Number Called: [phone] (home)     Call outcome: Spoke to patient regarding message below.    Message: Called patient to discuss concerns of GI distress.  Patient reports abdominal pain, frequent loos, watery stools.  Denies vomiting or blood in stools.  Patient states that she continues to lose weight because she has bowel movement after every time she eats and does not feel that she is absorbing nutrients.  Declined urgent care.  Appt scheduled to see Dr. Dorsey tomorrow since PCP is out of office this week.  ER precautions advised.

## 2022-06-14 ENCOUNTER — OFFICE VISIT (OUTPATIENT)
Dept: MEDICAL GROUP | Age: 77
End: 2022-06-14
Payer: MEDICARE

## 2022-06-14 VITALS
HEART RATE: 71 BPM | OXYGEN SATURATION: 97 % | DIASTOLIC BLOOD PRESSURE: 88 MMHG | BODY MASS INDEX: 18.14 KG/M2 | WEIGHT: 92.4 LBS | TEMPERATURE: 99.3 F | HEIGHT: 60 IN | SYSTOLIC BLOOD PRESSURE: 154 MMHG

## 2022-06-14 DIAGNOSIS — K29.50 CHRONIC GASTRITIS WITHOUT BLEEDING, UNSPECIFIED GASTRITIS TYPE: ICD-10-CM

## 2022-06-14 DIAGNOSIS — K52.9 CHRONIC DIARRHEA: ICD-10-CM

## 2022-06-14 DIAGNOSIS — K31.1: ICD-10-CM

## 2022-06-14 DIAGNOSIS — R10.84 GENERALIZED ABDOMINAL PAIN: ICD-10-CM

## 2022-06-14 DIAGNOSIS — K20.90 CHRONIC ESOPHAGITIS: ICD-10-CM

## 2022-06-14 PROCEDURE — 99214 OFFICE O/P EST MOD 30 MIN: CPT | Performed by: INTERNAL MEDICINE

## 2022-06-14 RX ORDER — SUCRALFATE 1 G/1
1 TABLET ORAL
Qty: 120 TABLET | Refills: 3 | Status: SHIPPED | OUTPATIENT
Start: 2022-06-14 | End: 2022-06-27

## 2022-06-14 ASSESSMENT — ENCOUNTER SYMPTOMS
HEARTBURN: 1
CONSTITUTIONAL NEGATIVE: 1
NAUSEA: 1
DIARRHEA: 1
ABDOMINAL PAIN: 1
RESPIRATORY NEGATIVE: 1
MUSCULOSKELETAL NEGATIVE: 1
NEUROLOGICAL NEGATIVE: 1
CARDIOVASCULAR NEGATIVE: 1
PSYCHIATRIC NEGATIVE: 1
EYES NEGATIVE: 1

## 2022-06-14 ASSESSMENT — PAIN SCALES - GENERAL: PAINLEVEL: 5=MODERATE PAIN

## 2022-06-14 ASSESSMENT — FIBROSIS 4 INDEX: FIB4 SCORE: 1.54

## 2022-06-15 NOTE — PROGRESS NOTES
Subjective     Ila Bangura is a 77 y.o. female who presents with Abdominal Pain (After colonoscopy and endoscopy on 5/13/2022 done in [place]), Diarrhea (After eating, undigested food in stools since 5/13/2022), and Weight Loss (Unwanted weight loss for over 1 month)    This a new patient me unable see PCP today.  She has a 1 month history of burning epigastric pain and nausea postprandially, as well as chronic loose watery stools ever since she underwent upper endoscopy and colonoscopy about a month ago in [facility].  Prior to those episodes she had only complaint of pyrosis and no diarrhea.  There is been no blood in the stool.  She has had negative gallbladder ultrasound in the past.    The upper endoscopy showed esophagitis, gastritis, a pleuritic stricture that was dilated, and a hiatal hernia.  Biopsies confirmed the inflammatory changes but no H. pylori was seen and no changes consistent with celiac disease were seen.  Reports were scanned into media recently.  Colonoscopy was normal except for rectal hemorrhoids.  No biopsies done.  No changes to explain diarrhea that she recent complained of.    Patient has been Prilosec the last month since her upper endoscopy which she feels may be contributing to her loose watery stools.  She had a much improved bowel movement for the first time yesterday.  But still not completely formed.    No recent COVID.    Allergies   Allergen Reactions   • Augmentin Hives     Hard to breath   • Aspirin Nausea     Heart burn, even with omeprazole   • Amoxicillin-Pot Clavulanate Swelling and Hives     Outpatient Medications Prior to Visit   Medication Sig Dispense Refill   • citalopram (CELEXA) 10 MG tablet Take 1 Tablet by mouth every day. 90 Tablet 3   • QVAR REDIHALER 40 MCG/ACT inhaler USE 2 INHALATIONS TWICE A DAY 10.6 g 3   • atorvastatin (LIPITOR) 10 MG Tab TAKE 1 TABLET EVERY EVENING 90 Tablet 1   • fexofenadine (ALLEGRA) 180 MG tablet Take 1 Tablet by  mouth every day. 90 Tablet 1   • losartan (COZAAR) 25 MG Tab Take 1 Tablet by mouth every day. 90 Tablet 3   • levalbuterol (XOPENEX HFA) 45 MCG/ACT inhaler Inhale 1-2 Puffs every four hours as needed for Shortness of Breath. 45 g 3   • Calcium Carbonate Antacid (TUMS PO) Take 1,000 mg by mouth 1 time a day as needed.     • Multiple Vitamins-Minerals (CENTRUM SILVER ADULT 50+ PO) Take  by mouth.     • omeprazole (PRILOSEC) 20 MG delayed-release capsule TAKE 1 CAPSULE DAILY 90 Capsule 1     No facility-administered medications prior to visit.     Patient Active Problem List    Diagnosis Date Noted   • Chronic esophagitis- EGD may 2022 in [place] 06/14/2022   • Chronic gastritis without bleeding 06/14/2022   • Chronic diarrhea- neg colonoscopy - may 2022 in [place] 06/14/2022   • Osteopenia of multiple sites 04/18/2022   • Age-related cataract of both eyes 10/26/2021   • Ganglion cyst of both hands 02/01/2021   • FHx: colon cancer 01/17/2020   • Cystocele with uterine prolapse_OBGYN 12/11/2019   • Prediabetes 10/10/2016   • Essential hypertension 10/09/2016   • Dyslipidemia 10/09/2016   • Mild persistent asthma without complication 10/09/2016   • Gastroesophageal reflux disease without esophagitis 10/09/2016               HPI    Review of Systems   Constitutional: Negative.    HENT: Negative.    Eyes: Negative.    Respiratory: Negative.    Cardiovascular: Negative.    Gastrointestinal: Positive for abdominal pain, diarrhea, heartburn and nausea.   Genitourinary: Negative.    Musculoskeletal: Negative.    Skin: Negative.    Neurological: Negative.    Endo/Heme/Allergies: Negative.    Psychiatric/Behavioral: Negative.               Objective     BP (!) 154/88 (BP Location: Left arm, Patient Position: Sitting, BP Cuff Size: Adult)   Pulse 71   Temp 37.4 °C (99.3 °F)   Ht 1.524 m (5')   Wt 41.9 kg (92 lb 6.4 oz)   LMP  (LMP Unknown)   SpO2 97%   BMI 18.05 kg/m²      Physical Exam  Vitals reviewed.    Constitutional:       General: She is not in acute distress.     Appearance: She is well-developed. She is not diaphoretic.   HENT:      Head: Normocephalic and atraumatic.      Right Ear: External ear normal.      Left Ear: External ear normal.      Nose: Nose normal.      Mouth/Throat:      Pharynx: No oropharyngeal exudate.   Eyes:      General: No scleral icterus.        Right eye: No discharge.         Left eye: No discharge.      Conjunctiva/sclera: Conjunctivae normal.      Pupils: Pupils are equal, round, and reactive to light.   Neck:      Thyroid: No thyromegaly.      Vascular: No JVD.      Trachea: No tracheal deviation.   Cardiovascular:      Rate and Rhythm: Normal rate and regular rhythm.      Heart sounds: Normal heart sounds. No murmur heard.    No friction rub. No gallop.   Pulmonary:      Effort: Pulmonary effort is normal. No respiratory distress.      Breath sounds: Normal breath sounds. No stridor. No wheezing or rales.   Chest:      Chest wall: No tenderness.   Abdominal:      General: Bowel sounds are normal. There is no distension.      Palpations: Abdomen is soft. There is no mass.      Tenderness: There is no abdominal tenderness. There is no guarding or rebound.      Comments: Abdominal exam is benign.  No abdominal tenderness.  Bowel sounds are normal.  Belly is soft nontender.  No palpable masses.  No hepatomegaly.  Negative Wright sign   Musculoskeletal:         General: No tenderness. Normal range of motion.      Cervical back: Normal range of motion and neck supple.   Lymphadenopathy:      Cervical: No cervical adenopathy.   Skin:     General: Skin is warm and dry.      Coloration: Skin is not pale.      Findings: No erythema or rash.   Neurological:      Mental Status: She is alert and oriented to person, place, and time.      Cranial Nerves: No cranial nerve deficit.      Motor: No abnormal muscle tone.      Coordination: Coordination normal.      Deep Tendon Reflexes: Reflexes  are normal and symmetric. Reflexes normal.   Psychiatric:         Behavior: Behavior normal.         Thought Content: Thought content normal.         Judgment: Judgment normal.        No visits with results within 1 Month(s) from this visit.   Latest known visit with results is:   Hospital Outpatient Visit on 02/01/2022   Component Date Value   • Cholesterol,Tot 02/01/2022 193    • Triglycerides 02/01/2022 105    • HDL 02/01/2022 73    • LDL 02/01/2022 99    • Creatinine, Urine 02/01/2022 77.97    • Microalbumin, Urine Rand* 02/01/2022 <1.2    • Micro Alb Creat Ratio 02/01/2022 see below    • Glycohemoglobin 02/01/2022 6.0 (A)   • Est Avg Glucose 02/01/2022 126    • Sodium 02/01/2022 142    • Potassium 02/01/2022 4.9    • Chloride 02/01/2022 106    • Co2 02/01/2022 23    • Anion Gap 02/01/2022 13.0    • Glucose 02/01/2022 105 (A)   • Bun 02/01/2022 18    • Creatinine 02/01/2022 0.57    • Calcium 02/01/2022 10.2    • AST(SGOT) 02/01/2022 21    • ALT(SGPT) 02/01/2022 17    • Alkaline Phosphatase 02/01/2022 81    • Total Bilirubin 02/01/2022 0.6    • Albumin 02/01/2022 4.8    • Total Protein 02/01/2022 8.2    • Globulin 02/01/2022 3.4    • A-G Ratio 02/01/2022 1.4    • WBC 02/01/2022 5.9    • RBC 02/01/2022 4.98    • Hemoglobin 02/01/2022 15.7    • Hematocrit 02/01/2022 47.2 (A)   • MCV 02/01/2022 94.8    • MCH 02/01/2022 31.5    • MCHC 02/01/2022 33.3 (A)   • RDW 02/01/2022 39.6    • Platelet Count 02/01/2022 254    • MPV 02/01/2022 9.7    • Neutrophils-Polys 02/01/2022 55.40    • Lymphocytes 02/01/2022 33.70    • Monocytes 02/01/2022 7.40    • Eosinophils 02/01/2022 2.20    • Basophils 02/01/2022 1.00    • Immature Granulocytes 02/01/2022 0.30    • Nucleated RBC 02/01/2022 0.00    • Neutrophils (Absolute) 02/01/2022 3.24    • Lymphs (Absolute) 02/01/2022 1.97    • Monos (Absolute) 02/01/2022 0.43    • Eos (Absolute) 02/01/2022 0.13    • Baso (Absolute) 02/01/2022 0.06    • Immature Granulocytes (a* 02/01/2022 0.02     • NRBC (Absolute) 02/01/2022 0.00    • Fasting Status 02/01/2022 Fasting    • GFR If  02/01/2022 >60    • GFR If Non  Ameri* 02/01/2022 >60       Lab Results   Component Value Date/Time    HBA1C 6.0 (H) 02/01/2022 08:40 AM    HBA1C 5.8 (H) 01/26/2021 11:13 AM     Lab Results   Component Value Date/Time    SODIUM 142 02/01/2022 08:40 AM    POTASSIUM 4.9 02/01/2022 08:40 AM    CHLORIDE 106 02/01/2022 08:40 AM    CO2 23 02/01/2022 08:40 AM    GLUCOSE 105 (H) 02/01/2022 08:40 AM    BUN 18 02/01/2022 08:40 AM    CREATININE 0.57 02/01/2022 08:40 AM    ALKPHOSPHAT 81 02/01/2022 08:40 AM    ASTSGOT 21 02/01/2022 08:40 AM    ALTSGPT 17 02/01/2022 08:40 AM    TBILIRUBIN 0.6 02/01/2022 08:40 AM     No results found for: INR  Lab Results   Component Value Date/Time    CHOLSTRLTOT 193 02/01/2022 08:40 AM    LDL 99 02/01/2022 08:40 AM    HDL 73 02/01/2022 08:40 AM    TRIGLYCERIDE 105 02/01/2022 08:40 AM       No results found for: TESTOSTERONE  No results found for: TSH  No results found for: FREET4  No results found for: URICACID  No components found for: VITB12  No results found for: 25HYDROXY'                          Assessment & Plan        1. Generalized abdominal pain-I suspect this is secondary to her severe esophagitis and gastritis and recent dilatation of caloric stricture.  I wonder if perhaps the PPI Prilosec might not be contributing to her loose stools and therefore we will try switching to sucralfate to treat her esophagitis and gastritis.  Avoid PPIs for now.  She is done well with H2 blockers in the past but they have never been quite sufficient to control her GERD symptoms.      - Comp Metabolic Panel; Future  - CBC WITH DIFFERENTIAL; Future  - Sed Rate; Future  - URINALYSIS,CULTURE IF INDICATED; Future  - sucralfate (CARAFATE) 1 GM Tab; Take 1 Tablet by mouth 4 Times a Day,Before Meals and at Bedtime.  Dispense: 120 Tablet; Refill: 3  - Referral to Gastroenterology    2. Chronic  esophagitis-   EGD may 2022 in [place]  See above.  This seems to be the primary cause of her symptoms.  Does not appear as though Prilosec is working for this.  We will try Carafate in place of this.  Perhaps a Prostic is a contributor to her diarrhea which will be discontinued.  - sucralfate (CARAFATE) 1 GM Tab; Take 1 Tablet by mouth 4 Times a Day,Before Meals and at Bedtime.  Dispense: 120 Tablet; Refill: 3  - Referral to Gastroenterology    3. Chronic gastritis without bleeding, unspecified gastritis type-  EGD may 2022 in [place]  See above    4. Acquired pyloric stricture- dilated on EGD may 2022 in [place]  See above.  Presumably the dilatation has been successful as she is able to pass food through her stomach.  But probably may need of repeat upper endoscopy by GI locally.  Discussed on follow-up visit.    5. Chronic diarrhea- neg colonoscopy - may 2022 in [place]- ? due to PPI      Since the diarrhea is improving.  We will hold off on any stool specimens for now but may need stool O&P C&S and C. difficile for further evaluation follow-up visit if her symptoms do not resolve.    Follow-up with PCP in 2 weeks.  She will let us know if the diarrhea persist at which point we will order stool specimens.

## 2022-06-27 ENCOUNTER — OFFICE VISIT (OUTPATIENT)
Dept: MEDICAL GROUP | Age: 77
End: 2022-06-27
Payer: MEDICARE

## 2022-06-27 VITALS
OXYGEN SATURATION: 98 % | TEMPERATURE: 97.6 F | SYSTOLIC BLOOD PRESSURE: 124 MMHG | WEIGHT: 91 LBS | HEART RATE: 81 BPM | DIASTOLIC BLOOD PRESSURE: 64 MMHG | BODY MASS INDEX: 17.87 KG/M2 | HEIGHT: 60 IN

## 2022-06-27 DIAGNOSIS — R19.5 DARK STOOLS: ICD-10-CM

## 2022-06-27 DIAGNOSIS — K31.1 PYLORIC STENOSIS IN ADULT: ICD-10-CM

## 2022-06-27 DIAGNOSIS — R10.13 EPIGASTRIC PAIN: ICD-10-CM

## 2022-06-27 DIAGNOSIS — K21.9 GASTROESOPHAGEAL REFLUX DISEASE WITHOUT ESOPHAGITIS: ICD-10-CM

## 2022-06-27 DIAGNOSIS — R10.11 RUQ PAIN: ICD-10-CM

## 2022-06-27 DIAGNOSIS — N90.7 LABIAL CYST: ICD-10-CM

## 2022-06-27 PROBLEM — K20.90 CHRONIC ESOPHAGITIS: Status: RESOLVED | Noted: 2022-06-14 | Resolved: 2022-06-27

## 2022-06-27 PROBLEM — K29.50 CHRONIC GASTRITIS WITHOUT BLEEDING: Status: RESOLVED | Noted: 2022-06-14 | Resolved: 2022-06-27

## 2022-06-27 PROBLEM — K52.9 CHRONIC DIARRHEA: Status: RESOLVED | Noted: 2022-06-14 | Resolved: 2022-06-27

## 2022-06-27 PROCEDURE — 99215 OFFICE O/P EST HI 40 MIN: CPT | Performed by: FAMILY MEDICINE

## 2022-06-27 RX ORDER — FAMOTIDINE 20 MG/1
20 TABLET, FILM COATED ORAL 2 TIMES DAILY
Qty: 60 TABLET | Refills: 2 | Status: SHIPPED | OUTPATIENT
Start: 2022-06-27 | End: 2022-09-06 | Stop reason: SDUPTHER

## 2022-06-27 ASSESSMENT — FIBROSIS 4 INDEX: FIB4 SCORE: 1.54

## 2022-06-27 NOTE — PROGRESS NOTES
Subjective:   CC: epigastric pain     HPI:     Ila Bangura is a 77 y.o. female, established patient of the clinic.     Patient recently underwent endoscopy and colonoscopy by Dr. Garcia in [place].  Patient developed epigastric abdominal pain, early satiety, appetite suppression and weight loss.  Patient was seen by myself on 5/26/2018.  At that time, records from endoscopy and colonoscopy were not available.  Patient also stated at the time that her symptoms were improving and that she was able to tolerate oral diet/hydration without problems.  She also began to gain some weight.  During last office visit, patient was advised to continue omeprazole 20 mg daily to improve epigastric pain following endoscopy.  Unfortunately, patient developed acute diarrhea and persistent abdominal pain and was seen by Dr. Dorsey on 6/14/2022.  She was instructed to stop PPI due to concerns of PPI related diarrhea.  Patient was also prescribed sucralfate and referred to local gastroenterologist.  Today, patient states that her diarrhea has resolved after discontinuation of PPI.  However, she did not  Rx for sucralfate.  She was instructed to take H2 blockers, however, patient decided not to take any medications since her diarrhea have resolved.  Today, she complains of persistent epigastric abdominal pain with intermittent dark stool.  Pain sometimes radiates to the right upper quadrant and to the right mid back.  Symptoms usually worse after she eats. She also complains of intermittent metallic or bitter taste at night.  She is able to tolerate oral diet and hydration.  Negative fever, chills, nausea, vomiting.     Patient is not sexually active.  She complains of nontender, nonpruritic bumps on the labia majora.  Negative abnormal vaginal bleeding or discharge.  Negative history of STD.    Current medicines (including changes today)  Current Outpatient Medications   Medication Sig Dispense Refill   • famotidine  (PEPCID) 20 MG Tab Take 1 Tablet by mouth 2 times a day. 60 Tablet 2   • QVAR REDIHALER 40 MCG/ACT inhaler USE 2 INHALATIONS TWICE A DAY 10.6 g 3   • atorvastatin (LIPITOR) 10 MG Tab TAKE 1 TABLET EVERY EVENING 90 Tablet 1   • fexofenadine (ALLEGRA) 180 MG tablet Take 1 Tablet by mouth every day. 90 Tablet 1   • losartan (COZAAR) 25 MG Tab Take 1 Tablet by mouth every day. 90 Tablet 3   • levalbuterol (XOPENEX HFA) 45 MCG/ACT inhaler Inhale 1-2 Puffs every four hours as needed for Shortness of Breath. 45 g 3   • Calcium Carbonate Antacid (TUMS PO) Take 1,000 mg by mouth 1 time a day as needed.     • Multiple Vitamins-Minerals (CENTRUM SILVER ADULT 50+ PO) Take  by mouth.       No current facility-administered medications for this visit.     She  has a past medical history of Asthma, Diabetes (HCC), and GERD (gastroesophageal reflux disease).    I reviewed patient's problem list, allergies, medications, family hx, social hx with patient and update EPIC.        Objective:     /64 (BP Location: Right arm, Patient Position: Sitting, BP Cuff Size: Adult)   Pulse 81   Temp 36.4 °C (97.6 °F) (Temporal)   Ht 1.524 m (5')   Wt 41.3 kg (91 lb)   SpO2 98%  Body mass index is 17.77 kg/m².    Physical Exam:  Constitutional: awake, alert, in no distress.  Skin: Warm, dry, good turgor, no rashes, bruises, ulcers in visible areas.  Eye: conjunctiva clear, lids neg for edema or lesions.  Neck: Trachea midline, no masses, no thyromegaly. No cervical or supraclavicular lymphadenopathy  Respiratory: Unlabored respiratory effort, lungs clear to auscultation, no wheezes, no rales.  Cardiovascular: Normal S1, S2, no murmur, no pedal edema.  Abdomen: Soft, non-tender to palpation, active BS, no hernia, no hepatosplenomegaly, negative rebound or guarding.   Psych: Oriented x3, affect and mood wnl, intact judgement and insight.   GYN: whitish, nontender, nonerythematous papule on the labia majora bilaterally negative abnormal  vaginal bleeding or discharge.    Assessment and Plan:   The following treatment plan was discussed    1. Epigastric pain  2. RUQ pain  3. Gastroesophageal reflux disease without esophagitis  4. Dark stools  5. Pyloric stenosis in adult s/p balloon dilation  77-year-old female with acute development of intermittent epigastric pain following recent endoscopy and colonoscopy done in [place] with Dr. Garcia.  Esophageal and gastric biopsies was unremarkable.  Patient did have pyloric stenosis status post dilation.  Unfortunately, patient developed epigastric pain, early satiety, reduced appetite, weight loss following the procedure.  When she was seen by myself on 5/26/2022, most of her symptoms have improved.  She was asked to restart omeprazole 20 mg daily to help with epigastric symptoms.  She used to take omeprazole 20 mg as needed prior to above-mentioned EGD and endoscopy.  Unfortunately, patient developed acute diarrhea and was seen by Dr. Dorsey on 6/14/2022.  She was instructed to stop omeprazole due to concerns of PPI related diarrhea.  Today, patient states that her diarrhea have stopped after discontinuation of PPI.  She did not  Rx for sucralfate and Pepcid as directed by Dr. Dorsey.  She continues to have intermittent epigastric pain rating to the right upper quadrant and sometimes mid back.  She also complains of intermittent dark stool.  She is able to tolerate oral diet and hydration without any problems. She continues to have well-formed stool since discontinuation of PPI. She tolerated H2-blockers in the past. She was referred to local GI, but has not scheduled appointment.  - US-RUQ; Future  - famotidine (PEPCID) 20 MG Tab; Take 1 Tablet by mouth 2 times a day.  Dispense: 60 Tablet; Refill: 2  - OCCULT BLOOD STOOL; Future  - schedule appointment to follow up with local GI   - ER precautions discussed.   - follow up in 2 weeks.     6. Labial cyst  History and exam are consistent with benign  labial sebaceous cysts.   Conservative management recommended and discussed.      Total time spent reviewing pt's chart, labs, notes, imaging, and counseling/prescribing medications to patient before, during, and after the visit: 40 minutes.      Erin Treviño M.D.      Followup: Return in about 2 weeks (around 7/11/2022) for Multiple issues.    Please note that this dictation was created using voice recognition software. I have made every reasonable attempt to correct obvious errors, but I expect that there are errors of grammar and possibly content that I did not discover before finalizing the note.

## 2022-07-01 ENCOUNTER — HOSPITAL ENCOUNTER (OUTPATIENT)
Dept: RADIOLOGY | Facility: MEDICAL CENTER | Age: 77
End: 2022-07-01
Attending: FAMILY MEDICINE
Payer: MEDICARE

## 2022-07-01 DIAGNOSIS — R10.13 EPIGASTRIC PAIN: ICD-10-CM

## 2022-07-01 DIAGNOSIS — K76.0 HEPATIC STEATOSIS: ICD-10-CM

## 2022-07-01 DIAGNOSIS — R10.11 RUQ PAIN: ICD-10-CM

## 2022-07-01 DIAGNOSIS — K76.9 LIVER LESION, RIGHT LOBE: ICD-10-CM

## 2022-07-01 DIAGNOSIS — K76.9 LIVER DISEASE: ICD-10-CM

## 2022-07-01 PROCEDURE — 76705 ECHO EXAM OF ABDOMEN: CPT

## 2022-07-05 ENCOUNTER — HOSPITAL ENCOUNTER (OUTPATIENT)
Dept: LAB | Facility: MEDICAL CENTER | Age: 77
End: 2022-07-05
Attending: FAMILY MEDICINE
Payer: MEDICARE

## 2022-07-05 DIAGNOSIS — R73.03 PREDIABETES: ICD-10-CM

## 2022-07-05 LAB
ALBUMIN SERPL BCP-MCNC: 4.7 G/DL (ref 3.2–4.9)
ALBUMIN/GLOB SERPL: 1.6 G/DL
ALP SERPL-CCNC: 82 U/L (ref 30–99)
ALT SERPL-CCNC: 14 U/L (ref 2–50)
ANION GAP SERPL CALC-SCNC: 11 MMOL/L (ref 7–16)
AST SERPL-CCNC: 21 U/L (ref 12–45)
BASOPHILS # BLD AUTO: 1.1 % (ref 0–1.8)
BASOPHILS # BLD: 0.04 K/UL (ref 0–0.12)
BILIRUB SERPL-MCNC: 0.4 MG/DL (ref 0.1–1.5)
BUN SERPL-MCNC: 16 MG/DL (ref 8–22)
CALCIUM SERPL-MCNC: 10 MG/DL (ref 8.5–10.5)
CHLORIDE SERPL-SCNC: 106 MMOL/L (ref 96–112)
CO2 SERPL-SCNC: 25 MMOL/L (ref 20–33)
CREAT SERPL-MCNC: 0.55 MG/DL (ref 0.5–1.4)
EOSINOPHIL # BLD AUTO: 0.06 K/UL (ref 0–0.51)
EOSINOPHIL NFR BLD: 1.6 % (ref 0–6.9)
ERYTHROCYTE [DISTWIDTH] IN BLOOD BY AUTOMATED COUNT: 40.3 FL (ref 35.9–50)
EST. AVERAGE GLUCOSE BLD GHB EST-MCNC: 114 MG/DL
GFR SERPLBLD CREATININE-BSD FMLA CKD-EPI: 94 ML/MIN/1.73 M 2
GLOBULIN SER CALC-MCNC: 3 G/DL (ref 1.9–3.5)
GLUCOSE SERPL-MCNC: 110 MG/DL (ref 65–99)
HBA1C MFR BLD: 5.6 % (ref 4–5.6)
HCT VFR BLD AUTO: 45.1 % (ref 37–47)
HGB BLD-MCNC: 15.1 G/DL (ref 12–16)
IMM GRANULOCYTES # BLD AUTO: 0.01 K/UL (ref 0–0.11)
IMM GRANULOCYTES NFR BLD AUTO: 0.3 % (ref 0–0.9)
LYMPHOCYTES # BLD AUTO: 1.36 K/UL (ref 1–4.8)
LYMPHOCYTES NFR BLD: 36.8 % (ref 22–41)
MCH RBC QN AUTO: 31.4 PG (ref 27–33)
MCHC RBC AUTO-ENTMCNC: 33.5 G/DL (ref 33.6–35)
MCV RBC AUTO: 93.8 FL (ref 81.4–97.8)
MONOCYTES # BLD AUTO: 0.28 K/UL (ref 0–0.85)
MONOCYTES NFR BLD AUTO: 7.6 % (ref 0–13.4)
NEUTROPHILS # BLD AUTO: 1.95 K/UL (ref 2–7.15)
NEUTROPHILS NFR BLD: 52.6 % (ref 44–72)
NRBC # BLD AUTO: 0 K/UL
NRBC BLD-RTO: 0 /100 WBC
PLATELET # BLD AUTO: 223 K/UL (ref 164–446)
PMV BLD AUTO: 10.3 FL (ref 9–12.9)
POTASSIUM SERPL-SCNC: 4.2 MMOL/L (ref 3.6–5.5)
PROT SERPL-MCNC: 7.7 G/DL (ref 6–8.2)
RBC # BLD AUTO: 4.81 M/UL (ref 4.2–5.4)
SODIUM SERPL-SCNC: 142 MMOL/L (ref 135–145)
WBC # BLD AUTO: 3.7 K/UL (ref 4.8–10.8)

## 2022-07-05 PROCEDURE — 80053 COMPREHEN METABOLIC PANEL: CPT

## 2022-07-05 PROCEDURE — 36415 COLL VENOUS BLD VENIPUNCTURE: CPT | Mod: GA

## 2022-07-05 PROCEDURE — 83036 HEMOGLOBIN GLYCOSYLATED A1C: CPT | Mod: GA

## 2022-07-05 PROCEDURE — 85025 COMPLETE CBC W/AUTO DIFF WBC: CPT

## 2022-07-06 ENCOUNTER — APPOINTMENT (OUTPATIENT)
Dept: RADIOLOGY | Facility: MEDICAL CENTER | Age: 77
End: 2022-07-06
Attending: FAMILY MEDICINE
Payer: MEDICARE

## 2022-07-06 DIAGNOSIS — K76.9 LIVER DISEASE: ICD-10-CM

## 2022-07-06 DIAGNOSIS — K76.0 HEPATIC STEATOSIS: ICD-10-CM

## 2022-07-06 DIAGNOSIS — K76.9 LIVER LESION, RIGHT LOBE: ICD-10-CM

## 2022-07-06 PROCEDURE — 74183 MRI ABD W/O CNTR FLWD CNTR: CPT | Mod: ME

## 2022-07-06 PROCEDURE — 700117 HCHG RX CONTRAST REV CODE 255: Performed by: FAMILY MEDICINE

## 2022-07-06 PROCEDURE — A9576 INJ PROHANCE MULTIPACK: HCPCS | Performed by: FAMILY MEDICINE

## 2022-07-06 RX ADMIN — GADOTERIDOL 12 ML: 279.3 INJECTION, SOLUTION INTRAVENOUS at 15:10

## 2022-07-07 ENCOUNTER — HOSPITAL ENCOUNTER (OUTPATIENT)
Facility: MEDICAL CENTER | Age: 77
End: 2022-07-07
Attending: FAMILY MEDICINE
Payer: MEDICARE

## 2022-07-07 PROCEDURE — 82274 ASSAY TEST FOR BLOOD FECAL: CPT

## 2022-07-08 DIAGNOSIS — R19.5 DARK STOOLS: ICD-10-CM

## 2022-07-11 LAB — IMM ASSAY OCC BLD FITOB: NEGATIVE

## 2022-07-15 ENCOUNTER — OFFICE VISIT (OUTPATIENT)
Dept: MEDICAL GROUP | Age: 77
End: 2022-07-15
Payer: MEDICARE

## 2022-07-15 VITALS
OXYGEN SATURATION: 98 % | BODY MASS INDEX: 17.67 KG/M2 | TEMPERATURE: 97 F | HEART RATE: 74 BPM | DIASTOLIC BLOOD PRESSURE: 64 MMHG | SYSTOLIC BLOOD PRESSURE: 118 MMHG | WEIGHT: 90 LBS | HEIGHT: 60 IN

## 2022-07-15 DIAGNOSIS — K21.9 GASTROESOPHAGEAL REFLUX DISEASE WITHOUT ESOPHAGITIS: ICD-10-CM

## 2022-07-15 DIAGNOSIS — M54.81 OCCIPITAL NEURALGIA OF LEFT SIDE: ICD-10-CM

## 2022-07-15 DIAGNOSIS — R73.03 PREDIABETES: ICD-10-CM

## 2022-07-15 DIAGNOSIS — F41.1 GAD (GENERALIZED ANXIETY DISORDER): ICD-10-CM

## 2022-07-15 DIAGNOSIS — E78.00 PURE HYPERCHOLESTEROLEMIA: ICD-10-CM

## 2022-07-15 DIAGNOSIS — D18.03 HEPATIC HEMANGIOMA: ICD-10-CM

## 2022-07-15 DIAGNOSIS — K44.9 HIATAL HERNIA: ICD-10-CM

## 2022-07-15 PROCEDURE — 99215 OFFICE O/P EST HI 40 MIN: CPT | Performed by: FAMILY MEDICINE

## 2022-07-15 RX ORDER — CITALOPRAM HYDROBROMIDE 10 MG/1
10 TABLET ORAL DAILY
Qty: 90 TABLET | Refills: 1 | Status: SHIPPED | OUTPATIENT
Start: 2022-07-15 | End: 2023-01-16

## 2022-07-15 ASSESSMENT — FIBROSIS 4 INDEX: FIB4 SCORE: 1.94

## 2022-07-17 PROBLEM — Z83.3 FHX: DIABETES MELLITUS: Status: ACTIVE | Noted: 2022-07-17

## 2022-07-17 PROBLEM — K44.9 HIATAL HERNIA: Status: ACTIVE | Noted: 2022-07-17

## 2022-07-17 PROBLEM — D18.03 HEPATIC HEMANGIOMA: Status: ACTIVE | Noted: 2022-07-01

## 2022-07-17 NOTE — PROGRESS NOTES
Subjective:   CC: GERD follow up     HPI:     Ila Bangura is a 77 y.o. female, established patient of the clinic.     Patient has chronic GERD and Barrette's esophagus, previously controlled with PPI PRN. Patient underwent endoscopy and colonoscopy in 5/2022 with pyloric dilation in [place]. Colonoscopy was negative. She was found to have distal esophagitis, diffuse gastritis, and hiatal hernia. Pathology report for both esophageal and gastric biopsies were negative for significant abnormalities. She developed epigastric pain, anorexia, and weight loss following the procedure. She was advised to restart PPI, but unfortunately, she develops acute diarrhea after restarting PPI. She is now taking Pepcid 20 mg BID. Diarrhea has resolved. She is able to tolerate diet and hydration without problems. She states that she is not able to tolerate large meals due to relapse of epigastric discomfort. Recent RUQ ultrasound and abdominal MRI are negative for acute findings. There are numerous hepatic cysts and hemangioma. There is also e/o hepatic steatosis.  Her weight remains stable. Her recent CBC, CMP and guaiac were negative.    She has chronic prediabetes and hyperlipidemia. She is working on diet and lifestyle modification to improve both conditions.     She also complained of intermittent sharp pain to the left lateral occipital scalp. Negative recent brain MRI. Symptoms are not too bothersome to patient.     She also has chronic YASH. Her  states that she worries excessively about everything. This leads to relationship problems, sleep, QoL. She was previously prescribe Citalopram, but did not want to start the medication due to concerns of side effects. However, she wishes to try medication again today.     Current medicines (including changes today)  Current Outpatient Medications   Medication Sig Dispense Refill   • citalopram (CELEXA) 10 MG tablet Take 1 Tablet by mouth every day. 90 Tablet 1   • famotidine  (PEPCID) 20 MG Tab Take 1 Tablet by mouth 2 times a day. 60 Tablet 2   • QVAR REDIHALER 40 MCG/ACT inhaler USE 2 INHALATIONS TWICE A DAY 10.6 g 3   • atorvastatin (LIPITOR) 10 MG Tab TAKE 1 TABLET EVERY EVENING 90 Tablet 1   • fexofenadine (ALLEGRA) 180 MG tablet Take 1 Tablet by mouth every day. 90 Tablet 1   • losartan (COZAAR) 25 MG Tab Take 1 Tablet by mouth every day. 90 Tablet 3   • levalbuterol (XOPENEX HFA) 45 MCG/ACT inhaler Inhale 1-2 Puffs every four hours as needed for Shortness of Breath. 45 g 3   • Calcium Carbonate Antacid (TUMS PO) Take 1,000 mg by mouth 1 time a day as needed.     • Multiple Vitamins-Minerals (CENTRUM SILVER ADULT 50+ PO) Take  by mouth.       No current facility-administered medications for this visit.     She  has a past medical history of Asthma, Diabetes (HCC), and GERD (gastroesophageal reflux disease).    I reviewed patient's problem list, allergies, medications, family hx, social hx with patient and update EPIC.        Objective:     /64 (BP Location: Right arm, Patient Position: Sitting, BP Cuff Size: Child)   Pulse 74   Temp 36.1 °C (97 °F) (Temporal)   Ht 1.524 m (5')   Wt 40.8 kg (90 lb)   SpO2 98%  Body mass index is 17.58 kg/m².    Physical Exam:  Constitutional: awake, alert, in no distress.  Skin: Warm, dry, good turgor, no rashes, bruises, ulcers in visible areas.  Eye: conjunctiva clear, lids neg for edema or lesions.  ENMT: TM and auditory canals wnl.   Neck: Trachea midline, no masses, no thyromegaly. No cervical or supraclavicular lymphadenopathy  Respiratory: Unlabored respiratory effort, lungs clear to auscultation, no wheezes, no rales.  Cardiovascular: Normal S1, S2, no murmur, no pedal edema.  Abdomen: Soft, non-tender to palpation, active BS, no hernia, no hepatosplenomegaly, negative rebound or guarding.   Psych: Oriented x3, affect and mood wnl, intact judgement and insight.       Assessment and Plan:   The following treatment plan was  discussed    1. Gastroesophageal reflux disease without esophagitis  Chronic, negative EGD in 5/2022 in [place]. EGD found hiatal hernia and pyloric stenosis s/p dilation. She develops diarrhea with PPI. She is now on Pepcid 20 mg BID. She is doing relatively well. Recent CBC, CMP, guaiac were negative for significant abnormalities.  - continue Pepcid 20 mg BID  - avoid large meals  - dietary modification  - patient has significant social stressors which might contribute to GI symptoms. Appropriate counseling provided.      2. Prediabetes  - Dietary/lifestyle modification and maintain healthy body weight  - positive multiple first-degree family members with type 2 diabetes.       3. Pure hypercholesterolemia  Chronic, controlled with Lipitor 10 mg qd, no s/e reported, will continue.    - dietary modification, exercise   - avoid alcohol, drugs, tobacco products     4. Occipital neuralgia of left side  Negative MRI in 10/2021  Symptoms are mild and infrequent, recommended conservative treatment.     5. YASH (generalized anxiety disorder)  - citalopram (CELEXA) 10 MG tablet; Take 1 Tablet by mouth every day.  Dispense: 90 Tablet; Refill: 1  - side effects and expectation discussed.     6. Hepatic hemangioma & cysts  Per recent RUQ US and abdominal MRI.   Reassurance provided.      Total time spent reviewing pt's chart, labs, notes, imaging, and counseling/prescribing medications to patient before, during, and after the visit: 40 minutes.      Erin Treviño M.D.      Followup: Return in about 3 months (around 10/15/2022) for Mental Health F/U.    Please note that this dictation was created using voice recognition software. I have made every reasonable attempt to correct obvious errors, but I expect that there are errors of grammar and possibly content that I did not discover before finalizing the note.

## 2022-09-06 DIAGNOSIS — K21.9 GASTROESOPHAGEAL REFLUX DISEASE WITHOUT ESOPHAGITIS: ICD-10-CM

## 2022-09-06 DIAGNOSIS — R10.11 RUQ PAIN: ICD-10-CM

## 2022-09-06 DIAGNOSIS — K31.1 PYLORIC STENOSIS IN ADULT: ICD-10-CM

## 2022-09-06 DIAGNOSIS — I10 ESSENTIAL HYPERTENSION: ICD-10-CM

## 2022-09-06 DIAGNOSIS — R10.13 EPIGASTRIC PAIN: ICD-10-CM

## 2022-09-06 DIAGNOSIS — E78.5 DYSLIPIDEMIA: ICD-10-CM

## 2022-09-06 RX ORDER — FAMOTIDINE 20 MG/1
20 TABLET, FILM COATED ORAL 2 TIMES DAILY
Qty: 180 TABLET | Refills: 1 | Status: SHIPPED | OUTPATIENT
Start: 2022-09-06 | End: 2023-03-06

## 2022-09-06 RX ORDER — ATORVASTATIN CALCIUM 10 MG/1
10 TABLET, FILM COATED ORAL EVERY EVENING
Qty: 90 TABLET | Refills: 3 | Status: SHIPPED | OUTPATIENT
Start: 2022-09-06 | End: 2022-11-02 | Stop reason: SDUPTHER

## 2022-09-06 RX ORDER — LOSARTAN POTASSIUM 25 MG/1
25 TABLET ORAL DAILY
Qty: 90 TABLET | Refills: 3 | Status: SHIPPED | OUTPATIENT
Start: 2022-09-06 | End: 2022-11-02 | Stop reason: SDUPTHER

## 2022-09-06 NOTE — TELEPHONE ENCOUNTER
"Received request via: Patient    Was the patient seen in the last year in this department? Yes    Does the patient have an active prescription (recently filled or refills available) for medication(s) requested? No, wants rx sent to local [facility]     Pt wondering if she should continue taking famotidine. She's \"been taking it for a while.\"  "

## 2022-11-02 DIAGNOSIS — E78.5 DYSLIPIDEMIA: ICD-10-CM

## 2022-11-02 DIAGNOSIS — I10 ESSENTIAL HYPERTENSION: ICD-10-CM

## 2022-11-02 RX ORDER — ATORVASTATIN CALCIUM 10 MG/1
10 TABLET, FILM COATED ORAL EVERY EVENING
Qty: 90 TABLET | Refills: 3 | Status: SHIPPED | OUTPATIENT
Start: 2022-11-02 | End: 2023-07-18 | Stop reason: SDUPTHER

## 2022-11-02 RX ORDER — LOSARTAN POTASSIUM 25 MG/1
25 TABLET ORAL DAILY
Qty: 90 TABLET | Refills: 3 | Status: SHIPPED | OUTPATIENT
Start: 2022-11-02 | End: 2023-07-18 | Stop reason: SDUPTHER

## 2022-11-03 ENCOUNTER — PATIENT MESSAGE (OUTPATIENT)
Dept: HEALTH INFORMATION MANAGEMENT | Facility: OTHER | Age: 77
End: 2022-11-03

## 2022-12-23 ENCOUNTER — HOSPITAL ENCOUNTER (OUTPATIENT)
Dept: RADIOLOGY | Facility: MEDICAL CENTER | Age: 77
End: 2022-12-23
Attending: FAMILY MEDICINE
Payer: MEDICARE

## 2022-12-23 DIAGNOSIS — Z12.31 VISIT FOR SCREENING MAMMOGRAM: ICD-10-CM

## 2022-12-23 PROCEDURE — 77063 BREAST TOMOSYNTHESIS BI: CPT

## 2023-01-08 ENCOUNTER — PATIENT MESSAGE (OUTPATIENT)
Dept: MEDICAL GROUP | Age: 78
End: 2023-01-08
Payer: MEDICARE

## 2023-01-08 DIAGNOSIS — J45.30 MILD PERSISTENT ASTHMA WITHOUT COMPLICATION: ICD-10-CM

## 2023-01-09 RX ORDER — LEVALBUTEROL TARTRATE 45 UG/1
1-2 AEROSOL, METERED ORAL EVERY 4 HOURS PRN
Qty: 45 G | Refills: 5 | Status: SHIPPED | OUTPATIENT
Start: 2023-01-09 | End: 2023-06-16 | Stop reason: SDUPTHER

## 2023-01-09 RX ORDER — BECLOMETHASONE DIPROPIONATE HFA 40 UG/1
1 AEROSOL, METERED RESPIRATORY (INHALATION) 2 TIMES DAILY
Qty: 10.6 G | Refills: 5 | Status: SHIPPED | OUTPATIENT
Start: 2023-01-09 | End: 2023-07-18 | Stop reason: SDUPTHER

## 2023-01-10 ENCOUNTER — TELEPHONE (OUTPATIENT)
Dept: MEDICAL GROUP | Age: 78
End: 2023-01-10
Payer: MEDICARE

## 2023-01-10 NOTE — TELEPHONE ENCOUNTER
VOICEMAIL  1. Caller Name: Ila Bangura                        Call Back Number: [phone] (home)       2. Message: pt has been coughing for 3 days. She is wondering if she can get in sooner. Pt currently scheduled for next available 1/13/23 at 0740am.    3. Patient approves office to leave a detailed voicemail/MyChart message: yes

## 2023-01-12 ENCOUNTER — OFFICE VISIT (OUTPATIENT)
Dept: MEDICAL GROUP | Age: 78
End: 2023-01-12
Payer: MEDICARE

## 2023-01-12 ENCOUNTER — HOSPITAL ENCOUNTER (OUTPATIENT)
Facility: MEDICAL CENTER | Age: 78
End: 2023-01-12
Attending: FAMILY MEDICINE
Payer: MEDICARE

## 2023-01-12 VITALS
BODY MASS INDEX: 18.26 KG/M2 | TEMPERATURE: 97.8 F | OXYGEN SATURATION: 98 % | WEIGHT: 93 LBS | SYSTOLIC BLOOD PRESSURE: 116 MMHG | HEART RATE: 80 BPM | DIASTOLIC BLOOD PRESSURE: 64 MMHG | HEIGHT: 60 IN

## 2023-01-12 DIAGNOSIS — Z23 NEED FOR VACCINATION: ICD-10-CM

## 2023-01-12 DIAGNOSIS — I10 ESSENTIAL HYPERTENSION: ICD-10-CM

## 2023-01-12 DIAGNOSIS — J45.21 MILD INTERMITTENT ASTHMA WITH EXACERBATION: ICD-10-CM

## 2023-01-12 DIAGNOSIS — J06.9 VIRAL URI WITH COUGH: ICD-10-CM

## 2023-01-12 LAB
FLUAV+FLUBV AG SPEC QL IA: NORMAL
INT CON NEG: NEGATIVE
INT CON POS: POSITIVE

## 2023-01-12 PROCEDURE — G0008 ADMIN INFLUENZA VIRUS VAC: HCPCS | Performed by: FAMILY MEDICINE

## 2023-01-12 PROCEDURE — 90662 IIV NO PRSV INCREASED AG IM: CPT | Performed by: FAMILY MEDICINE

## 2023-01-12 PROCEDURE — 99214 OFFICE O/P EST MOD 30 MIN: CPT | Mod: 25 | Performed by: FAMILY MEDICINE

## 2023-01-12 PROCEDURE — 87804 INFLUENZA ASSAY W/OPTIC: CPT | Performed by: FAMILY MEDICINE

## 2023-01-12 PROCEDURE — U0003 INFECTIOUS AGENT DETECTION BY NUCLEIC ACID (DNA OR RNA); SEVERE ACUTE RESPIRATORY SYNDROME CORONAVIRUS 2 (SARS-COV-2) (CORONAVIRUS DISEASE [COVID-19]), AMPLIFIED PROBE TECHNIQUE, MAKING USE OF HIGH THROUGHPUT TECHNOLOGIES AS DESCRIBED BY CMS-2020-01-R: HCPCS

## 2023-01-12 PROCEDURE — U0005 INFEC AGEN DETEC AMPLI PROBE: HCPCS

## 2023-01-12 RX ORDER — AZITHROMYCIN 250 MG/1
TABLET, FILM COATED ORAL
Qty: 6 TABLET | Refills: 0 | Status: SHIPPED | OUTPATIENT
Start: 2023-01-12 | End: 2023-01-17

## 2023-01-12 RX ORDER — BENZONATATE 100 MG/1
100 CAPSULE ORAL 3 TIMES DAILY PRN
Qty: 21 CAPSULE | Refills: 0 | Status: SHIPPED | OUTPATIENT
Start: 2023-01-12 | End: 2023-01-19

## 2023-01-12 RX ORDER — METHYLPREDNISOLONE 4 MG/1
TABLET ORAL
Qty: 21 TABLET | Refills: 0 | Status: SHIPPED | OUTPATIENT
Start: 2023-01-12 | End: 2023-04-17

## 2023-01-12 ASSESSMENT — PATIENT HEALTH QUESTIONNAIRE - PHQ9: CLINICAL INTERPRETATION OF PHQ2 SCORE: 0

## 2023-01-12 ASSESSMENT — FIBROSIS 4 INDEX: FIB4 SCORE: 1.94

## 2023-01-12 NOTE — PROGRESS NOTES
Subjective:   CC: acute cough    HPI:     Ila Bangura is a 77 y.o. female, established patient of the clinic.     Acute productive cough and SOB onset 3-4 days ago  She tried over-the-counter syrup, but it hurts her stomach   Negative fever, chills  Negative rapid Covid test at home   Patient has history of chronic mild intermittent asthma.  Patient has not been taking Qvar or albuterol for quite some time.     Patient also has chronic essential hypertension, currently taking losartan 25 mg daily.  She tolerates losartan well, no side effect reported.  Home blood pressure has been within normal limits.    Current medicines (including changes today)  Current Outpatient Medications   Medication Sig Dispense Refill    methylPREDNISolone (MEDROL DOSEPAK) 4 MG Tablet Therapy Pack As directed on the packaging label. 21 Tablet 0    azithromycin (ZITHROMAX) 250 MG Tab Take 2 tablets on the first day and 1 tablet daily thereafter for 4 days. 6 Tablet 0    benzonatate (TESSALON) 100 MG Cap Take 1 Capsule by mouth 3 times a day as needed for Cough for up to 7 days. 21 Capsule 0    levalbuterol (XOPENEX HFA) 45 MCG/ACT inhaler Inhale 1-2 Puffs every four hours as needed for Shortness of Breath. 45 g 5    beclomethasone HFA (QVAR REDIHALER) 40 MCG/ACT inhaler Inhale 1 Puff 2 times a day. 10.6 g 5    atorvastatin (LIPITOR) 10 MG Tab Take 1 Tablet by mouth every evening. 90 Tablet 3    losartan (COZAAR) 25 MG Tab Take 1 Tablet by mouth every day. 90 Tablet 3    famotidine (PEPCID) 20 MG Tab Take 1 Tablet by mouth 2 times a day. 180 Tablet 1    citalopram (CELEXA) 10 MG tablet Take 1 Tablet by mouth every day. 90 Tablet 1    fexofenadine (ALLEGRA) 180 MG tablet Take 1 Tablet by mouth every day. 90 Tablet 1    Calcium Carbonate Antacid (TUMS PO) Take 1,000 mg by mouth 1 time a day as needed.      Multiple Vitamins-Minerals (CENTRUM SILVER ADULT 50+ PO) Take  by mouth.       No current facility-administered medications for  this visit.     She  has a past medical history of Asthma, Diabetes (HCC), and GERD (gastroesophageal reflux disease).    I reviewed patient's problem list, allergies, medications, family hx, social hx with patient and update EPIC.        Objective:     /64 (BP Location: Right arm, Patient Position: Sitting, BP Cuff Size: Small adult)   Pulse 80   Temp 36.6 °C (97.8 °F) (Temporal)   Ht 1.524 m (5')   Wt 42.2 kg (93 lb)   SpO2 98%  Body mass index is 18.16 kg/m².    Physical Exam:  Constitutional: awake, alert, in no distress.  Skin: Warm, dry, good turgor, no rashes, bruises, ulcers in visible areas.  Eye: conjunctiva clear, lids neg for edema or lesions.  ENMT: TM and auditory canals wnl.  Pale congested nasal mucosa. Lips without lesions, good dentition, oropharynx clear.  Neck: Trachea midline, no masses, no thyromegaly. No cervical or supraclavicular lymphadenopathy  Respiratory: Unlabored respiratory effort, mild diffuse wheezes, no rales.  Cardiovascular: Normal S1, S2, no murmur, no pedal edema.  Abdomen: Soft, non-tender to palpation, active BS, no hernia, no hepatosplenomegaly, negative rebound or guarding.   Psych: Oriented x3, affect and mood wnl, intact judgement and insight.       Assessment and Plan:   The following treatment plan was discussed    1. Viral URI with cough  2. Mild intermittent asthma with exacerbation  History and exam are concerning for viral URI with cough leading to acute exacerbation of asthma.  Patient has not been taking asthma medication as directed.  Rapid home COVID was negative.  Patient with tested negative for influenza during office visit today.  Patient is due for influenza vaccine and COVID booster.  - POCT Influenza A/B  - SARS-CoV-2, PCR (In-House): Collect NP OR nasal swab in VTM; Future  - methylPREDNISolone (MEDROL DOSEPAK) 4 MG Tablet Therapy Pack; As directed on the packaging label.  Dispense: 21 Tablet; Refill: 0  - azithromycin (ZITHROMAX) 250 MG Tab;  Take 2 tablets on the first day and 1 tablet daily thereafter for 4 days.  Dispense: 6 Tablet; Refill: 0  - benzonatate (TESSALON) 100 MG Cap; Take 1 Capsule by mouth 3 times a day as needed for Cough for up to 7 days.  Dispense: 21 Capsule; Refill: 0    3. Essential hypertension  Chronic, controlled with losartan 25 mg daily, no s/e reported, will continue.      4. Need for vaccination  - Influenza Vaccine, High Dose (65+ Only)   - Recommended COVID booster at local pharmacies.      Erin Treviño M.D.      Followup: Return for As needed.    Please note that this dictation was created using voice recognition software. I have made every reasonable attempt to correct obvious errors, but I expect that there are errors of grammar and possibly content that I did not discover before finalizing the note.

## 2023-01-12 NOTE — TELEPHONE ENCOUNTER
Phone Number Called: [phone] (home)       Call outcome: Left detailed message for patient. Informed to call back with any additional questions.    Message: informed pt there was an opening today 1/12/23 at 8:20am, and that I scheduled her in that time slot. She still has her appt tomorrow if she can't make today.

## 2023-01-13 DIAGNOSIS — J06.9 VIRAL URI WITH COUGH: ICD-10-CM

## 2023-01-13 LAB
SARS-COV-2 RNA RESP QL NAA+PROBE: NOTDETECTED
SPECIMEN SOURCE: NORMAL

## 2023-01-16 DIAGNOSIS — F41.1 GAD (GENERALIZED ANXIETY DISORDER): ICD-10-CM

## 2023-01-16 RX ORDER — CITALOPRAM HYDROBROMIDE 10 MG/1
10 TABLET ORAL DAILY
Qty: 90 TABLET | Refills: 1 | Status: SHIPPED | OUTPATIENT
Start: 2023-01-16 | End: 2023-04-17

## 2023-03-05 DIAGNOSIS — R10.11 RUQ PAIN: ICD-10-CM

## 2023-03-05 DIAGNOSIS — R10.13 EPIGASTRIC PAIN: ICD-10-CM

## 2023-03-05 DIAGNOSIS — K21.9 GASTROESOPHAGEAL REFLUX DISEASE WITHOUT ESOPHAGITIS: ICD-10-CM

## 2023-03-05 DIAGNOSIS — K31.1 PYLORIC STENOSIS IN ADULT: ICD-10-CM

## 2023-03-06 RX ORDER — FAMOTIDINE 20 MG/1
TABLET, FILM COATED ORAL
Qty: 180 TABLET | Refills: 1 | Status: SHIPPED | OUTPATIENT
Start: 2023-03-06 | End: 2023-04-17

## 2023-04-17 ENCOUNTER — OFFICE VISIT (OUTPATIENT)
Dept: MEDICAL GROUP | Age: 78
End: 2023-04-17
Payer: MEDICARE

## 2023-04-17 VITALS
HEART RATE: 76 BPM | DIASTOLIC BLOOD PRESSURE: 64 MMHG | WEIGHT: 94.6 LBS | OXYGEN SATURATION: 99 % | BODY MASS INDEX: 18.57 KG/M2 | HEIGHT: 60 IN | TEMPERATURE: 98.5 F | SYSTOLIC BLOOD PRESSURE: 122 MMHG

## 2023-04-17 DIAGNOSIS — R44.1 VISUAL HALLUCINATION: ICD-10-CM

## 2023-04-17 DIAGNOSIS — Z23 NEED FOR VACCINATION: ICD-10-CM

## 2023-04-17 DIAGNOSIS — R10.13 EPIGASTRIC PAIN: ICD-10-CM

## 2023-04-17 DIAGNOSIS — K44.9 HIATAL HERNIA: ICD-10-CM

## 2023-04-17 DIAGNOSIS — Z63.0 MARITAL PROBLEM: ICD-10-CM

## 2023-04-17 DIAGNOSIS — K31.1 PYLORIC STENOSIS IN ADULT: ICD-10-CM

## 2023-04-17 DIAGNOSIS — F41.1 GAD (GENERALIZED ANXIETY DISORDER): ICD-10-CM

## 2023-04-17 PROCEDURE — 90746 HEPB VACCINE 3 DOSE ADULT IM: CPT | Performed by: FAMILY MEDICINE

## 2023-04-17 PROCEDURE — G0010 ADMIN HEPATITIS B VACCINE: HCPCS | Performed by: FAMILY MEDICINE

## 2023-04-17 PROCEDURE — G2212 PROLONG OUTPT/OFFICE VIS: HCPCS | Performed by: FAMILY MEDICINE

## 2023-04-17 PROCEDURE — 99215 OFFICE O/P EST HI 40 MIN: CPT | Mod: 25 | Performed by: FAMILY MEDICINE

## 2023-04-17 RX ORDER — FAMOTIDINE 20 MG/1
20 TABLET, FILM COATED ORAL 2 TIMES DAILY
COMMUNITY
End: 2023-06-16 | Stop reason: SDUPTHER

## 2023-04-17 RX ORDER — ESCITALOPRAM OXALATE 5 MG/1
5 TABLET ORAL DAILY
Qty: 90 TABLET | Refills: 1 | Status: SHIPPED | OUTPATIENT
Start: 2023-04-17 | End: 2023-06-16

## 2023-04-17 SDOH — SOCIAL STABILITY - SOCIAL INSECURITY: PROBLEMS IN RELATIONSHIP WITH SPOUSE OR PARTNER: Z63.0

## 2023-04-17 ASSESSMENT — FIBROSIS 4 INDEX: FIB4 SCORE: 1.96

## 2023-04-17 NOTE — PROGRESS NOTES
"Subjective:   CC: Marital problems    HPI:     Ila Bangura is a 78 y.o. female, established patient of the clinic.     Ongoing epigastric pain for months   Worse after she eats despite her best efforts to avoid acidic and spicy food.  Also complained of increased abdominal sounds   Unable to tolerate cold drinks   She also complained of increased intra-abdominal gas  Takes Famotidine 20 mg BID PRN with some relief. She does not want to take PPI.   She has hiatal hernia.   Every time she gets upset, she needs to throw up.   History of pyloric stenosis status post balloon dilation in 2016 and 2022  History of GERD, Araya's esophagus  EGD done in 5/2022: Stomach biopsy was negative for intestinal metaplasia or H. pylori.  Esophageal biopsy was positive for reactive changes.  Colonoscopy done in May 2022 was notable for grade 2 internal hemorrhoid, otherwise normal colonoscopy.  Negative fever, chills, nausea, vomiting, diarrhea, constipation, hematochezia, melena.  She had right upper quadrant ultrasound done in July 2022 which was essentially normal except for increased echogenicity concerning for fatty liver.  Patient also complains of ongoing marital problems.  Patient thinks that her  has been having relationship with another woman as she found female undergarment in her bed twice.  She also complains of missing or misplacement of her cosmetics or clothing.  Both patient and her  are retired.  However, patient spent 1 night per week at her son's house to help him as he has kidney diseases. Patient accused her  of bring other women home when she is not sleeping at home.   Patient also complains of visual hallucination: seeing strange man/woman in her house at times. She talked to her  who thinks that she has the \"3rd eye\" that allows her to see things that others do not. She thinks that her house is haunted. She does not have this problems in the past   She has chronic YASH.  She " tried Citalopram once, then discontinued as she thinks that it causes her to have cognitive problems. She does not remember things well when she is on it. She read the side effects of Citalopram and definitely is not interested.   She wants to return to school to get her degree in Quantum physics.   [place] women [place] this town are flirty and try to steal her  from her.  She thinks that her  was cheating on her during the 20 years that he was in the [employer] as he was constantly away from home.     Current medicines (including changes today)  Current Outpatient Medications   Medication Sig Dispense Refill    famotidine (PEPCID) 20 MG Tab Take 20 mg by mouth 2 times a day.      escitalopram (LEXAPRO) 5 MG tablet Take 1 Tablet by mouth every day. 90 Tablet 1    levalbuterol (XOPENEX HFA) 45 MCG/ACT inhaler Inhale 1-2 Puffs every four hours as needed for Shortness of Breath. 45 g 5    beclomethasone HFA (QVAR REDIHALER) 40 MCG/ACT inhaler Inhale 1 Puff 2 times a day. 10.6 g 5    atorvastatin (LIPITOR) 10 MG Tab Take 1 Tablet by mouth every evening. 90 Tablet 3    losartan (COZAAR) 25 MG Tab Take 1 Tablet by mouth every day. 90 Tablet 3    Calcium Carbonate Antacid (TUMS PO) Take 1,000 mg by mouth 1 time a day as needed.      Multiple Vitamins-Minerals (CENTRUM SILVER ADULT 50+ PO) Take  by mouth.       No current facility-administered medications for this visit.     She  has a past medical history of Asthma, Diabetes (HCC), and GERD (gastroesophageal reflux disease).    I reviewed patient's problem list, allergies, medications, family hx, social hx with patient and update EPIC.        Objective:     /64 (BP Location: Left arm, Patient Position: Sitting, BP Cuff Size: Adult)   Pulse 76   Temp 36.9 °C (98.5 °F) (Temporal)   Ht 1.524 m (5')   Wt 42.9 kg (94 lb 9.6 oz)   SpO2 99%  Body mass index is 18.48 kg/m².    Physical Exam:  Constitutional: awake, alert, in no  distress.  Skin: Warm, dry, good turgor, no rashes, bruises, ulcers in visible areas.  Eye: conjunctiva clear, lids neg for edema or lesions.  ENMT:Lips without lesions, good dentition, oropharynx clear.  Neck: Trachea midline, no masses, no thyromegaly. No cervical or supraclavicular lymphadenopathy  Respiratory: Unlabored respiratory effort, lungs clear to auscultation, no wheezes, no rales.  Cardiovascular: Normal S1, S2, no murmur, no pedal edema.  Abdomen: Soft, non-tender to palpation, active BS, no hernia, no hepatosplenomegaly, negative rebound or guarding.   Psych: Oriented x3, affect and mood wnl, intact judgement and insight.       Assessment and Plan:   The following treatment plan was discussed    1. Marital problem  2. Visual hallucination  3. YASH (generalized anxiety disorder)  - escitalopram (LEXAPRO) 5 MG tablet; Take 1 Tablet by mouth every day.  Dispense: 90 Tablet; Refill: 1  - side effects and expectation discussed.   - Referral to Behavioral Health  - patient refused psychiatry referral.   - I had long conversation with patient and her  regarding her concerns. She is not interested in my recommendations or referral to psychiatry. However, she is open to see .   - follow up in 3 months.     4. Epigastric pain  5. Pyloric stenosis in adult s/p balloon dilation  6. Hiatal hernia  Ongoing epigastric pain despite recent negative EGD and colonoscopy.   She could not tolerate PPI. She has some relief with Famotidine.   She is thin, but her weight is stable.   Negative nausea, vomiting, hematochezia, melena, unexplained weight loss, nocturnal symptoms.    - continue Famotidine 20 mg BID  - dietary modification.   - Referral to Gastroenterology  - LIPASE; Future  - CELIAC DISEASE AB PANEL; Future  - avoid NSAID, alcohol, caffeine, etc     7. Need for vaccination  - Hepatitis B Vaccine Adult IM     Total time spent reviewing pt's chart, labs, notes, imaging, and counseling/prescribing  medications to patient before, during, and after the visit: 87 minutes.      Erin Treviño M.D.      Followup: Return in about 3 months (around 7/17/2023).    Please note that this dictation was created using voice recognition software. I have made every reasonable attempt to correct obvious errors, but I expect that there are errors of grammar and possibly content that I did not discover before finalizing the note.

## 2023-06-16 ENCOUNTER — OFFICE VISIT (OUTPATIENT)
Dept: MEDICAL GROUP | Age: 78
End: 2023-06-16
Payer: MEDICARE

## 2023-06-16 VITALS
SYSTOLIC BLOOD PRESSURE: 118 MMHG | BODY MASS INDEX: 18.3 KG/M2 | OXYGEN SATURATION: 98 % | HEART RATE: 66 BPM | DIASTOLIC BLOOD PRESSURE: 76 MMHG | TEMPERATURE: 98.6 F | HEIGHT: 60 IN | WEIGHT: 93.2 LBS

## 2023-06-16 DIAGNOSIS — I10 ESSENTIAL HYPERTENSION: ICD-10-CM

## 2023-06-16 DIAGNOSIS — R73.03 PREDIABETES: ICD-10-CM

## 2023-06-16 DIAGNOSIS — J45.30 MILD PERSISTENT ASTHMA WITHOUT COMPLICATION: ICD-10-CM

## 2023-06-16 DIAGNOSIS — N81.4 CYSTOCELE WITH UTERINE PROLAPSE: ICD-10-CM

## 2023-06-16 DIAGNOSIS — E78.00 PURE HYPERCHOLESTEROLEMIA: ICD-10-CM

## 2023-06-16 DIAGNOSIS — F41.1 GAD (GENERALIZED ANXIETY DISORDER): ICD-10-CM

## 2023-06-16 DIAGNOSIS — K21.9 GASTROESOPHAGEAL REFLUX DISEASE WITHOUT ESOPHAGITIS: ICD-10-CM

## 2023-06-16 PROCEDURE — 3078F DIAST BP <80 MM HG: CPT | Performed by: FAMILY MEDICINE

## 2023-06-16 PROCEDURE — 99214 OFFICE O/P EST MOD 30 MIN: CPT | Performed by: FAMILY MEDICINE

## 2023-06-16 PROCEDURE — 3074F SYST BP LT 130 MM HG: CPT | Performed by: FAMILY MEDICINE

## 2023-06-16 RX ORDER — LEVALBUTEROL TARTRATE 45 UG/1
1-2 AEROSOL, METERED ORAL EVERY 4 HOURS PRN
Qty: 45 G | Refills: 5 | Status: SHIPPED | OUTPATIENT
Start: 2023-06-16 | End: 2023-07-18 | Stop reason: SDUPTHER

## 2023-06-16 RX ORDER — FAMOTIDINE 20 MG/1
20 TABLET, FILM COATED ORAL 2 TIMES DAILY
Qty: 60 TABLET | Refills: 11 | Status: SHIPPED | OUTPATIENT
Start: 2023-06-16 | End: 2023-07-18 | Stop reason: SDUPTHER

## 2023-06-16 ASSESSMENT — FIBROSIS 4 INDEX: FIB4 SCORE: 1.96

## 2023-06-16 NOTE — PROGRESS NOTES
Subjective:   CC: Uterine prolapse    HPI:     Ila Bangura is a 78 y.o. female, established patient of the clinic.     Patient was previously diagnosed with cystocele with uterine prolapse.  She was referred to OB/GYN in the past, but not able to schedule the appointment due to the pandemic.  A few days ago, patient noted something falling out of her vaginal canal during bowel movement.  She later realized it was her uterus.  She was able to force the uterus back in place.  Patient is very concerned.  She wishes to be referred to OB/GYN for consultation.  Patient is not interested in surgery.  However, she is open to try pessary.  Patient has chronic hypertension, YASH, GERD, mild intermittent asthma, prediabetes, hyperlipidemia.  She is taking all medication as directed.  No side effect reported.  YASH is improving.  Patient declined to take citalopram prescribed to her at last office visit.  Her asthma is under good control.    Current medicines (including changes today)  Current Outpatient Medications   Medication Sig Dispense Refill    famotidine (PEPCID) 20 MG Tab Take 1 Tablet by mouth 2 times a day. 60 Tablet 11    levalbuterol (XOPENEX HFA) 45 MCG/ACT inhaler Inhale 1-2 Puffs every four hours as needed for Shortness of Breath. 45 g 5    beclomethasone HFA (QVAR REDIHALER) 40 MCG/ACT inhaler Inhale 1 Puff 2 times a day. 10.6 g 5    atorvastatin (LIPITOR) 10 MG Tab Take 1 Tablet by mouth every evening. 90 Tablet 3    losartan (COZAAR) 25 MG Tab Take 1 Tablet by mouth every day. 90 Tablet 3    Calcium Carbonate Antacid (TUMS PO) Take 1,000 mg by mouth 1 time a day as needed.      Multiple Vitamins-Minerals (CENTRUM SILVER ADULT 50+ PO) Take  by mouth.       No current facility-administered medications for this visit.     She  has a past medical history of Asthma, Diabetes (HCC), and GERD (gastroesophageal reflux disease).    I reviewed patient's problem list, allergies, medications, family hx, social hx  with patient and update EPIC.        Objective:     /76 (BP Location: Right arm, Patient Position: Sitting, BP Cuff Size: Small adult)   Pulse 66   Temp 37 °C (98.6 °F) (Temporal)   Ht 1.524 m (5')   Wt 42.3 kg (93 lb 3.2 oz)   SpO2 98%  Body mass index is 18.2 kg/m².    Physical Exam:  Constitutional: awake, alert, in no distress.  Skin: Warm, dry, good turgor, no rashes, bruises, ulcers in visible areas.  Eye: conjunctiva clear, lids neg for edema or lesions.  Neck: Trachea midline, no masses, no thyromegaly. No cervical or supraclavicular lymphadenopathy  Respiratory: Unlabored respiratory effort, lungs clear to auscultation, no wheezes, no rales.  Cardiovascular: Normal S1, S2, no murmur, no pedal edema.  Psych: Oriented x3, affect and mood wnl, intact judgement and insight.       Assessment and Plan:   The following treatment plan was discussed    1. Cystocele with uterine prolapse_OBGYN  Chronic worsening cystocele with uterine prolapse.  - Referral to Urogynecology    2. Essential hypertension  Chronic, controlled with losartan 25 mg daily, no s/e reported, will continue.    - Comp Metabolic Panel; Future  - CBC WITH DIFFERENTIAL; Future  - MICROALBUMIN CREAT RATIO URINE; Future    3. YASH (generalized anxiety disorder)  Chronic, improving since last office visit.  Patient declined to take citalopram.  She also declined referral to psychiatry or .   Her  states that she is doing better.  We will continue to monitor.    4. Gastroesophageal reflux disease without esophagitis  Chronic, controlled with famotidine 20 mg twice daily, no s/e reported, will continue.    - famotidine (PEPCID) 20 MG Tab; Take 1 Tablet by mouth 2 times a day.  Dispense: 60 Tablet; Refill: 11    5. Mild persistent asthma without complication  Chronic, controlled with Qvar and Xopenex, no s/e reported, will continue.    - levalbuterol (XOPENEX HFA) 45 MCG/ACT inhaler; Inhale 1-2 Puffs every four hours as needed for  Shortness of Breath.  Dispense: 45 g; Refill: 5    6. Prediabetes  - Dietary/lifestyle modification     - HEMOGLOBIN A1C; Future    7. Pure hypercholesterolemia  Chronic, controlled with Lipitor 10 mg daily, no s/e reported, will continue.    - Lipid Profile; Future      Ly JANA Treviño M.D.      Followup: Return in about 3 months (around 9/16/2023).    Please note that this dictation was created using voice recognition software. I have made every reasonable attempt to correct obvious errors, but I expect that there are errors of grammar and possibly content that I did not discover before finalizing the note.

## 2023-07-07 ENCOUNTER — HOSPITAL ENCOUNTER (OUTPATIENT)
Dept: LAB | Facility: MEDICAL CENTER | Age: 78
End: 2023-07-07
Attending: FAMILY MEDICINE
Payer: MEDICARE

## 2023-07-07 DIAGNOSIS — R10.13 EPIGASTRIC PAIN: ICD-10-CM

## 2023-07-07 DIAGNOSIS — R73.03 PREDIABETES: ICD-10-CM

## 2023-07-07 DIAGNOSIS — I10 ESSENTIAL HYPERTENSION: ICD-10-CM

## 2023-07-07 DIAGNOSIS — E78.00 PURE HYPERCHOLESTEROLEMIA: ICD-10-CM

## 2023-07-07 LAB
ALBUMIN SERPL BCP-MCNC: 4.2 G/DL (ref 3.2–4.9)
ALBUMIN/GLOB SERPL: 1.4 G/DL
ALP SERPL-CCNC: 61 U/L (ref 30–99)
ALT SERPL-CCNC: 10 U/L (ref 2–50)
ANION GAP SERPL CALC-SCNC: 13 MMOL/L (ref 7–16)
AST SERPL-CCNC: 17 U/L (ref 12–45)
BASOPHILS # BLD AUTO: 1.4 % (ref 0–1.8)
BASOPHILS # BLD: 0.06 K/UL (ref 0–0.12)
BILIRUB SERPL-MCNC: 0.4 MG/DL (ref 0.1–1.5)
BUN SERPL-MCNC: 19 MG/DL (ref 8–22)
CALCIUM ALBUM COR SERPL-MCNC: 9.3 MG/DL (ref 8.5–10.5)
CALCIUM SERPL-MCNC: 9.5 MG/DL (ref 8.5–10.5)
CHLORIDE SERPL-SCNC: 106 MMOL/L (ref 96–112)
CHOLEST SERPL-MCNC: 163 MG/DL (ref 100–199)
CO2 SERPL-SCNC: 24 MMOL/L (ref 20–33)
CREAT SERPL-MCNC: 0.68 MG/DL (ref 0.5–1.4)
CREAT UR-MCNC: 151.79 MG/DL
EOSINOPHIL # BLD AUTO: 0.3 K/UL (ref 0–0.51)
EOSINOPHIL NFR BLD: 6.9 % (ref 0–6.9)
ERYTHROCYTE [DISTWIDTH] IN BLOOD BY AUTOMATED COUNT: 40 FL (ref 35.9–50)
EST. AVERAGE GLUCOSE BLD GHB EST-MCNC: 137 MG/DL
FASTING STATUS PATIENT QL REPORTED: NORMAL
GFR SERPLBLD CREATININE-BSD FMLA CKD-EPI: 89 ML/MIN/1.73 M 2
GLOBULIN SER CALC-MCNC: 3 G/DL (ref 1.9–3.5)
GLUCOSE SERPL-MCNC: 92 MG/DL (ref 65–99)
HBA1C MFR BLD: 6.4 % (ref 4–5.6)
HCT VFR BLD AUTO: 43.6 % (ref 37–47)
HDLC SERPL-MCNC: 76 MG/DL
HGB BLD-MCNC: 14.3 G/DL (ref 12–16)
IMM GRANULOCYTES # BLD AUTO: 0.01 K/UL (ref 0–0.11)
IMM GRANULOCYTES NFR BLD AUTO: 0.2 % (ref 0–0.9)
LDLC SERPL CALC-MCNC: 68 MG/DL
LIPASE SERPL-CCNC: 38 U/L (ref 11–82)
LYMPHOCYTES # BLD AUTO: 1.62 K/UL (ref 1–4.8)
LYMPHOCYTES NFR BLD: 37.5 % (ref 22–41)
MCH RBC QN AUTO: 31.5 PG (ref 27–33)
MCHC RBC AUTO-ENTMCNC: 32.8 G/DL (ref 32.2–35.5)
MCV RBC AUTO: 96 FL (ref 81.4–97.8)
MICROALBUMIN UR-MCNC: 2.6 MG/DL
MICROALBUMIN/CREAT UR: 17 MG/G (ref 0–30)
MONOCYTES # BLD AUTO: 0.39 K/UL (ref 0–0.85)
MONOCYTES NFR BLD AUTO: 9 % (ref 0–13.4)
NEUTROPHILS # BLD AUTO: 1.94 K/UL (ref 1.82–7.42)
NEUTROPHILS NFR BLD: 45 % (ref 44–72)
NRBC # BLD AUTO: 0 K/UL
NRBC BLD-RTO: 0 /100 WBC (ref 0–0.2)
PLATELET # BLD AUTO: 231 K/UL (ref 164–446)
PMV BLD AUTO: 9.3 FL (ref 9–12.9)
POTASSIUM SERPL-SCNC: 4.2 MMOL/L (ref 3.6–5.5)
PROT SERPL-MCNC: 7.2 G/DL (ref 6–8.2)
RBC # BLD AUTO: 4.54 M/UL (ref 4.2–5.4)
SODIUM SERPL-SCNC: 143 MMOL/L (ref 135–145)
TRIGL SERPL-MCNC: 93 MG/DL (ref 0–149)
WBC # BLD AUTO: 4.3 K/UL (ref 4.8–10.8)

## 2023-07-07 PROCEDURE — 36415 COLL VENOUS BLD VENIPUNCTURE: CPT

## 2023-07-07 PROCEDURE — 83690 ASSAY OF LIPASE: CPT

## 2023-07-07 PROCEDURE — 82784 ASSAY IGA/IGD/IGG/IGM EACH: CPT

## 2023-07-07 PROCEDURE — 82570 ASSAY OF URINE CREATININE: CPT

## 2023-07-07 PROCEDURE — 80053 COMPREHEN METABOLIC PANEL: CPT

## 2023-07-07 PROCEDURE — 80061 LIPID PANEL: CPT

## 2023-07-07 PROCEDURE — 82043 UR ALBUMIN QUANTITATIVE: CPT

## 2023-07-07 PROCEDURE — 83036 HEMOGLOBIN GLYCOSYLATED A1C: CPT | Mod: GA

## 2023-07-07 PROCEDURE — 85025 COMPLETE CBC W/AUTO DIFF WBC: CPT

## 2023-07-07 PROCEDURE — 86364 TISS TRNSGLTMNASE EA IG CLAS: CPT

## 2023-07-09 LAB
IGA SERPL-MCNC: 274 MG/DL (ref 68–408)
TTG IGA SER IA-ACNC: <2 U/ML (ref 0–3)

## 2023-07-18 ENCOUNTER — OFFICE VISIT (OUTPATIENT)
Dept: MEDICAL GROUP | Facility: MEDICAL CENTER | Age: 78
End: 2023-07-18
Payer: MEDICARE

## 2023-07-18 VITALS
TEMPERATURE: 99.1 F | OXYGEN SATURATION: 98 % | HEART RATE: 74 BPM | HEIGHT: 60 IN | DIASTOLIC BLOOD PRESSURE: 62 MMHG | WEIGHT: 91.82 LBS | SYSTOLIC BLOOD PRESSURE: 120 MMHG | BODY MASS INDEX: 18.03 KG/M2

## 2023-07-18 DIAGNOSIS — R63.4 WEIGHT LOSS: ICD-10-CM

## 2023-07-18 DIAGNOSIS — R73.03 PREDIABETES: ICD-10-CM

## 2023-07-18 DIAGNOSIS — E78.00 PURE HYPERCHOLESTEROLEMIA: ICD-10-CM

## 2023-07-18 DIAGNOSIS — K31.1 PYLORIC STENOSIS IN ADULT: ICD-10-CM

## 2023-07-18 DIAGNOSIS — Z83.3 FHX: DIABETES MELLITUS: ICD-10-CM

## 2023-07-18 DIAGNOSIS — R10.13 EPIGASTRIC PAIN: ICD-10-CM

## 2023-07-18 DIAGNOSIS — J45.30 MILD PERSISTENT ASTHMA WITHOUT COMPLICATION: ICD-10-CM

## 2023-07-18 DIAGNOSIS — K21.9 GASTROESOPHAGEAL REFLUX DISEASE WITHOUT ESOPHAGITIS: ICD-10-CM

## 2023-07-18 DIAGNOSIS — I10 ESSENTIAL HYPERTENSION: ICD-10-CM

## 2023-07-18 DIAGNOSIS — Z80.3 FHX: BREAST CANCER: ICD-10-CM

## 2023-07-18 PROCEDURE — 3078F DIAST BP <80 MM HG: CPT | Performed by: FAMILY MEDICINE

## 2023-07-18 PROCEDURE — 99215 OFFICE O/P EST HI 40 MIN: CPT | Performed by: FAMILY MEDICINE

## 2023-07-18 PROCEDURE — 3074F SYST BP LT 130 MM HG: CPT | Performed by: FAMILY MEDICINE

## 2023-07-18 RX ORDER — BECLOMETHASONE DIPROPIONATE HFA 40 UG/1
1 AEROSOL, METERED RESPIRATORY (INHALATION) 2 TIMES DAILY
Qty: 10.6 G | Refills: 5 | Status: SHIPPED | OUTPATIENT
Start: 2023-07-18 | End: 2024-03-08

## 2023-07-18 RX ORDER — ATORVASTATIN CALCIUM 10 MG/1
10 TABLET, FILM COATED ORAL EVERY EVENING
Qty: 90 TABLET | Refills: 3 | Status: SHIPPED | OUTPATIENT
Start: 2023-07-18 | End: 2024-02-15

## 2023-07-18 RX ORDER — FAMOTIDINE 20 MG/1
20 TABLET, FILM COATED ORAL 2 TIMES DAILY
Qty: 60 TABLET | Refills: 11 | Status: SHIPPED | OUTPATIENT
Start: 2023-07-18 | End: 2023-11-21

## 2023-07-18 RX ORDER — LEVALBUTEROL TARTRATE 45 UG/1
1-2 AEROSOL, METERED ORAL EVERY 4 HOURS PRN
Qty: 45 G | Refills: 5 | Status: SHIPPED | OUTPATIENT
Start: 2023-07-18 | End: 2023-11-21 | Stop reason: SDUPTHER

## 2023-07-18 RX ORDER — LOSARTAN POTASSIUM 25 MG/1
25 TABLET ORAL DAILY
Qty: 90 TABLET | Refills: 3 | Status: SHIPPED | OUTPATIENT
Start: 2023-07-18 | End: 2024-02-27 | Stop reason: SDUPTHER

## 2023-07-18 ASSESSMENT — FIBROSIS 4 INDEX: FIB4 SCORE: 1.82

## 2023-07-18 NOTE — PROGRESS NOTES
Subjective:   CC: Hypertension follow-up    HPI:     Ila Bangura is a 78 y.o. female, established patient of the clinic.     Patient has chronic essential hypertension, hyperlipidemia, prediabetes, mild persistent asthma without complication.  Patient is taking all medications as directed.  She tolerates them well, no side effect reported.  Recent A1c was 6.4.  Positive familial history of type 2 diabetes.  Patient reports that she has been eating more sweets over the past few months.  She is also not physically active.  She used to run on treadmill 1hr/day.  However she stopped doing so for few months due to uterine prolapse.  She has appointment with urogynecologist in September 2023 to discuss management of uterine prolapse.  Patient has chronic GERD that is controlled with famotidine 2 mg twice daily.  Patient is not interested in taking PPI.  She was previously found to have pyloric stenosis status post dilation in 2016 and recently in 2022 in [place].  She complains of epigastric discomfort/pain with eating and early satiety.  Patient has not been able to eat well due to symptoms.  She lost approximately 30 pounds because of ongoing GI symptoms.  She will refer to GI in April 2023, but has not been contacted for appointment.  She will previously tested negative for H. pylori.  Recent test for celiac was negative.  Last mammogram was in December 2022.  Positive familial history of breast cancer in sister.  Patient is interested in genetic studies with [employer].    Current medicines (including changes today)  Current Outpatient Medications   Medication Sig Dispense Refill    atorvastatin (LIPITOR) 10 MG Tab Take 1 Tablet by mouth every evening. 90 Tablet 3    losartan (COZAAR) 25 MG Tab Take 1 Tablet by mouth every day. 90 Tablet 3    beclomethasone HFA (QVAR REDIHALER) 40 MCG/ACT inhaler Inhale 1 Puff 2 times a day. 10.6 g 5    levalbuterol (XOPENEX HFA) 45 MCG/ACT inhaler Inhale 1-2  Puffs every four hours as needed for Shortness of Breath. 45 g 5    famotidine (PEPCID) 20 MG Tab Take 1 Tablet by mouth 2 times a day. 60 Tablet 11    Calcium Carbonate Antacid (TUMS PO) Take 1,000 mg by mouth 1 time a day as needed.      Multiple Vitamins-Minerals (CENTRUM SILVER ADULT 50+ PO) Take  by mouth.       No current facility-administered medications for this visit.     She  has a past medical history of Asthma, Diabetes (HCC), and GERD (gastroesophageal reflux disease).    I reviewed patient's problem list, allergies, medications, family hx, social hx with patient and update EPIC.        Objective:     /62 (BP Location: Left arm, Patient Position: Sitting, BP Cuff Size: Adult)   Pulse 74   Temp 37.3 °C (99.1 °F) (Temporal)   Ht 1.524 m (5')   Wt 41.7 kg (91 lb 13.2 oz)   SpO2 98%  Body mass index is 17.93 kg/m².    Physical Exam:  Constitutional: awake, alert, in no distress.  Skin: Warm, dry, good turgor, no rashes, bruises, ulcers in visible areas.  Eye: conjunctiva clear, lids neg for edema or lesions.  ENMT: TM and auditory canals wnl. Oral and nasal mucosa wnl. Lips without lesions, good dentition, oropharynx clear.  Neck: Trachea midline, no masses, no thyromegaly. No cervical or supraclavicular lymphadenopathy  Respiratory: Unlabored respiratory effort, lungs clear to auscultation, no wheezes, no rales.  Cardiovascular: Normal S1, S2, no murmur, no pedal edema.  Abdomen: Soft, mild tender to palpation at the epigastrium area, active BS, no hernia, no hepatosplenomegaly, negative rebound or guarding.   Psych: Oriented x3, affect and mood wnl, intact judgement and insight.       Assessment and Plan:   The following treatment plan was discussed    1. Essential hypertension  Chronic, controlled with losartan 25 mg daily, no s/e reported, will continue.    - losartan (COZAAR) 25 MG Tab; Take 1 Tablet by mouth every day.  Dispense: 90 Tablet; Refill: 3    2. Pure  hypercholesterolemia  Chronic, controlled with Lipitor 10 mg daily, no s/e reported, will continue.    - atorvastatin (LIPITOR) 10 MG Tab; Take 1 Tablet by mouth every evening.  Dispense: 90 Tablet; Refill: 3    3. Prediabetes  4. FHx: diabetes mellitus  Recent A1c was 6.4.  Familial history of type 2 diabetes.  She endorses poor diet over the last few months.  She also stopped exercising regularly due to uterine prolapse.   - Dietary/lifestyle modification     - HEMOGLOBIN A1C; Future  - follow up in 3 months  - Patient has a mild appointment with urogynecologist in September 2023 to address uterine prolapse.  In the meantime, recommended gentle exercises.    5. Mild persistent asthma without complication  Chronic, controlled with Qvar and Xopenex as needed, no s/e reported, will continue.    - beclomethasone HFA (QVAR REDIHALER) 40 MCG/ACT inhaler; Inhale 1 Puff 2 times a day.  Dispense: 10.6 g; Refill: 5  - levalbuterol (XOPENEX HFA) 45 MCG/ACT inhaler; Inhale 1-2 Puffs every four hours as needed for Shortness of Breath.  Dispense: 45 g; Refill: 5    6. Pyloric stenosis in adult s/p balloon dilation  7. Gastroesophageal reflux disease without esophagitis  8. Epigastric pain  9. Weight loss  Chronic GERD, that is controlled with famotidine 20 mg twice daily.  Patient is not able to tolerate PPI.  Patient was found to have pyloric stenosis status post dilation in 2016 and 2022.  She complains of epigastric discomfort after meal, weight loss, early satiety.  Patient has not been able to eat well because of GI symptoms.  She was referred to GI in April 2023, but has not been able to schedule appointment.  She was previously tested negative for celiac disease and H. pylori.  Negative colonoscopy in 2022 in [place].  -Continue famotidine 20 mg twice daily  -Strongly encourage patient to schedule appointment with GI for consultation.  Contact information provided.    10. FHx: breast cancer  - Referral to Genetic  Research Studies   -Annual mammogram, next due December 2023.    Total time spent reviewing pt's chart, labs, notes, imaging, and counseling/prescribing medications to patient before, during, and after the visit: 43 minutes.        Erin Treviño M.D.      Followup: Return in about 3 months (around 10/18/2023) for prediabetes, Multiple issues.    Please note that this dictation was created using voice recognition software. I have made every reasonable attempt to correct obvious errors, but I expect that there are errors of grammar and possibly content that I did not discover before finalizing the note.

## 2023-07-18 NOTE — PATIENT INSTRUCTIONS
[facility]  [id number]  [place] [id number]  Phone: [phone]

## 2023-09-14 ENCOUNTER — OFFICE VISIT (OUTPATIENT)
Dept: GYNECOLOGY | Facility: CLINIC | Age: 78
End: 2023-09-14
Payer: MEDICARE

## 2023-09-14 VITALS — SYSTOLIC BLOOD PRESSURE: 167 MMHG | BODY MASS INDEX: 17.58 KG/M2 | WEIGHT: 90 LBS | DIASTOLIC BLOOD PRESSURE: 81 MMHG

## 2023-09-14 DIAGNOSIS — N95.2 ATROPHIC VAGINITIS: ICD-10-CM

## 2023-09-14 DIAGNOSIS — N36.2 URETHRAL CARUNCLE: ICD-10-CM

## 2023-09-14 DIAGNOSIS — N39.0 FREQUENT UTI: ICD-10-CM

## 2023-09-14 LAB
APPEARANCE UR: CLEAR
BILIRUB UR STRIP-MCNC: NEGATIVE MG/DL
COLOR UR AUTO: YELLOW
GLUCOSE UR STRIP.AUTO-MCNC: NEGATIVE MG/DL
KETONES UR STRIP.AUTO-MCNC: NEGATIVE MG/DL
LEUKOCYTE ESTERASE UR QL STRIP.AUTO: NEGATIVE
NITRITE UR QL STRIP.AUTO: NEGATIVE
PH UR STRIP.AUTO: 7 [PH] (ref 5–8)
PROT UR QL STRIP: NEGATIVE MG/DL
RBC UR QL AUTO: NEGATIVE
SP GR UR STRIP.AUTO: 1.01
UROBILINOGEN UR STRIP-MCNC: NORMAL MG/DL

## 2023-09-14 PROCEDURE — 3077F SYST BP >= 140 MM HG: CPT | Performed by: STUDENT IN AN ORGANIZED HEALTH CARE EDUCATION/TRAINING PROGRAM

## 2023-09-14 PROCEDURE — 99214 OFFICE O/P EST MOD 30 MIN: CPT | Performed by: STUDENT IN AN ORGANIZED HEALTH CARE EDUCATION/TRAINING PROGRAM

## 2023-09-14 PROCEDURE — 81002 URINALYSIS NONAUTO W/O SCOPE: CPT | Performed by: STUDENT IN AN ORGANIZED HEALTH CARE EDUCATION/TRAINING PROGRAM

## 2023-09-14 PROCEDURE — 3079F DIAST BP 80-89 MM HG: CPT | Performed by: STUDENT IN AN ORGANIZED HEALTH CARE EDUCATION/TRAINING PROGRAM

## 2023-09-14 RX ORDER — ESTRADIOL 0.1 MG/G
CREAM VAGINAL
Qty: 1 EACH | Refills: 3 | Status: SHIPPED | OUTPATIENT
Start: 2023-09-14 | End: 2024-03-08

## 2023-09-14 RX ORDER — ESTRADIOL 0.1 MG/G
CREAM VAGINAL
Qty: 1 EACH | Refills: 3 | Status: SHIPPED | OUTPATIENT
Start: 2023-09-14 | End: 2023-09-14

## 2023-09-14 ASSESSMENT — FIBROSIS 4 INDEX: FIB4 SCORE: 2.11

## 2023-09-14 NOTE — PROGRESS NOTES
Urogynecology and Pelvic Reconstructive Surgery Consultation Visit    Ila Bangura [id number] :1945    Referred by: Erin Treviño MD    Reason for Visit: No chief complaint on file.        Subjective     History of Presenting Illness:    Ila Bangura is a 78 y.o. year old P3 who was referred by Dr. Treviño for the evaluation and management of prolapse.    She was recently alarmed by a protrusion from the vagina which she is able to push back inside.  This was first noticeable 2 to 3 years ago, not worsening.  She is minimally bothered by symptoms.  She would prefer to avoid surgery.    She also has some trouble with her bowel movements, difficulty emptying.  She does not have to push on her prolapse in order to help splint her emptying, and she has had significant movement despite improving fiber intake with oatmeal and bananas for breakfast.      Pelvic floor symptom review:     Bladder:   Voids per day: 2 Voids per night: 1      Urinary incontinence episodes per day: 2    Urge leakage:  None   Stress leakage: With Exercise   Voiding symptoms: None   UTI in last 12 months: yes       Prolapse:     Prolapse symptoms: Exteriorized Tissue   Degree of prolapse: To Introitus   Duration of prolapse symptoms: years      Bowel:    Constipation: Yes - manages with diet.    Bowel movements per day: 1    Straining to empty bowels: No    Splinting to evacuate: No    Painful evacuation: No    Difficulty emptying rectum: Yes   Incontinence to stool: No   Blood in stool: No    Hemorrhoids: No       Sexual function:    Sexually active: Yes   Gender of partners: Male   Pain with intercourse: dryness            Past medical and surgical history    Past obstetric history   Number of vaginal deliveries: 3   Number of  deliveries: 0    Past medical history:  Past Medical History:   Diagnosis Date    Asthma     Diabetes (HCC)     borderline    GERD (gastroesophageal reflux disease)     acid reflux     Past surgical  history:  Past Surgical History:   Procedure Laterality Date    DENTAL SURGERY  2016    bone graft    COLONOSCOPY      negative    UPPER OR LOWER GI PROCEDURE WITH ANESTHESIA      x 2 upper GI     Medications:has a current medication list which includes the following prescription(s): estradiol, atorvastatin, losartan, qvar redihaler, levalbuterol, famotidine, calcium carbonate antacid, and multiple vitamins-minerals.  Allergies:Augmentin and Amoxicillin-pot clavulanate  Family history:  Family History   Problem Relation Age of Onset    Other Mother         Intestinal Problem    Diabetes Mother     Cancer Father         Stomach Cancer    Lung Disease Father         Smoker    Cancer Sister         breast cancer/mastectomy    Diabetes Sister     Diabetes Brother     Cancer Maternal Grandmother         bone cancer    No Known Problems Maternal Grandfather     Diabetes Brother     No Known Problems Sister     No Known Problems Paternal Grandmother     No Known Problems Paternal Grandfather      Social history: reports that she has never smoked. She has never used smokeless tobacco. She reports that she does not drink alcohol and does not use drugs.    Review of systems: A full review of systems was performed, and negative with the exception of want is noted above in the HPI.        Objective        BP (!) 167/81 (BP Location: Left arm, Patient Position: Sitting)   Wt 90 lb   LMP  (LMP Unknown)   BMI 17.58 kg/m²     Physical Exam  Vitals reviewed. Exam conducted with a chaperone present (MA - see notes.).   Constitutional:       Appearance: Normal appearance.   HENT:      Head: Normocephalic.      Mouth/Throat:      Mouth: Mucous membranes are moist.   Cardiovascular:      Rate and Rhythm: Normal rate.   Pulmonary:      Effort: Pulmonary effort is normal.   Abdominal:      Palpations: Abdomen is soft. There is no mass.      Tenderness: There is no abdominal tenderness.   Skin:     General: Skin is warm and dry.    Neurological:      Mental Status: She is alert.   Psychiatric:         Mood and Affect: Mood normal.         Genitourinary:    External female genitalia: WNL   Vulva: WNL   Bulbocavernosus reflex: Intact   Anal wink reflex: Intact   Perineal sensation: WNL   Urethra: Atrophic with caruncle   Vagina: Atrophic   Atrophy: Moderate   Cough stress test: Negative    Pelvic floor:    POP-Q: Aa -1 / Ba -1 / TVL 7 / C -5 / D -4 / Ap -1 / Bp -1 / GH 3 / PB 2   Cervical elongation: No    Urethral tenderness: No    Bladder/ suprapubic tenderness: No    Levator tenderness: None   Levator muscle tone: WNL   Pelvic floor contraction strength (modified Oxford scale): 2=Weak   Pelvic floor contraction duration: Brief    Bimanual exam: Small, Mobile Uterus     Procedure Performed: No    Diagnostic test and records review:        Labs: n/a    Radiology: n/a    Documentation reviewed: Prior EMR Records           Assessment & Plan     Ila Bangura is a 78 y.o. year old P3 with vaginal protrusion symptoms consistent with atrophy and urethral caruncle rather than overt prolapse.  She is given reassurance about her prolapse conditions, and she can follow-up if she has any worsening, but I would not recommend any pessary or surgical treatment at this time.  We discussed my recommendations for further diagnosis and treatment at length today.     1. Atrophic vaginitis  2. Urethral caruncle  3. Frequent UTI  Her exam confirms vaginal atrophy / genitorurinary syndrome of menopause. This is very common and due to low estrogen levels, which render the vaginal tissue thin, irritated, and open to colonization with gut rosalio. This can lead to irritation, dryness, painful sex, urinary infections and urinary urgency. Discussed risks, benefits, and indications for vaginal estrogen therapy.  Vaginal estrogen has negligible absorption into the bloodstream and is not associated with increased risks for uterine or breast cancers. the effects of  vaginal estrogen can take weeks to months.    - estradiol (ESTRACE VAGINAL) 0.1 MG/GM vaginal cream; Apply 1g cream inside vagina twice per week  Dispense: 1 Each; Refill: 3               Fe Sanchez MD, FACOG  Urogynecology and Pelvic Reconstructive Surgery  Department of Obstetrics and Gynecology  [facility]        This medical record contains text that has been entered with the assistance of computer voice recognition and dictation software.  Therefore, it may contain unintended errors in text, spelling, punctuation, or grammar

## 2023-10-15 NOTE — TELEPHONE ENCOUNTER
Pt requested to see doctor about discharge.  Came to assess him. Pt walked with nurse in the lemus. Provider decided that pt was capable of making own decisions. Pt decided to leave AMA. Discharged with belongings and family.     Bedside nurse: Tabatha Valero   Was the patient seen in the last year in this department? Yes    Does patient have an active prescription for medications requested? No     Received Request Via: Pharmacy

## 2023-11-13 ENCOUNTER — HOSPITAL ENCOUNTER (OUTPATIENT)
Dept: LAB | Facility: MEDICAL CENTER | Age: 78
End: 2023-11-13
Attending: FAMILY MEDICINE
Payer: MEDICARE

## 2023-11-13 DIAGNOSIS — R73.03 PREDIABETES: ICD-10-CM

## 2023-11-13 LAB
EST. AVERAGE GLUCOSE BLD GHB EST-MCNC: 126 MG/DL
HBA1C MFR BLD: 6 % (ref 4–5.6)

## 2023-11-13 PROCEDURE — 36415 COLL VENOUS BLD VENIPUNCTURE: CPT | Mod: GA

## 2023-11-13 PROCEDURE — 83036 HEMOGLOBIN GLYCOSYLATED A1C: CPT | Mod: GA

## 2023-11-21 ENCOUNTER — OFFICE VISIT (OUTPATIENT)
Dept: MEDICAL GROUP | Facility: MEDICAL CENTER | Age: 78
End: 2023-11-21
Payer: MEDICARE

## 2023-11-21 VITALS
TEMPERATURE: 97.7 F | BODY MASS INDEX: 17.83 KG/M2 | HEART RATE: 66 BPM | HEIGHT: 60 IN | DIASTOLIC BLOOD PRESSURE: 80 MMHG | SYSTOLIC BLOOD PRESSURE: 120 MMHG | OXYGEN SATURATION: 97 % | WEIGHT: 90.83 LBS

## 2023-11-21 DIAGNOSIS — K31.1 PYLORIC STENOSIS IN ADULT: ICD-10-CM

## 2023-11-21 DIAGNOSIS — R73.03 PREDIABETES: ICD-10-CM

## 2023-11-21 DIAGNOSIS — F41.1 GAD (GENERALIZED ANXIETY DISORDER): ICD-10-CM

## 2023-11-21 DIAGNOSIS — K21.9 GASTROESOPHAGEAL REFLUX DISEASE WITHOUT ESOPHAGITIS: ICD-10-CM

## 2023-11-21 DIAGNOSIS — E78.00 PURE HYPERCHOLESTEROLEMIA: ICD-10-CM

## 2023-11-21 DIAGNOSIS — K44.9 HIATAL HERNIA: ICD-10-CM

## 2023-11-21 DIAGNOSIS — I10 ESSENTIAL HYPERTENSION: ICD-10-CM

## 2023-11-21 DIAGNOSIS — J45.30 MILD PERSISTENT ASTHMA WITHOUT COMPLICATION: ICD-10-CM

## 2023-11-21 DIAGNOSIS — Z23 NEED FOR VACCINATION: ICD-10-CM

## 2023-11-21 PROCEDURE — 3079F DIAST BP 80-89 MM HG: CPT | Performed by: FAMILY MEDICINE

## 2023-11-21 PROCEDURE — G0010 ADMIN HEPATITIS B VACCINE: HCPCS | Performed by: FAMILY MEDICINE

## 2023-11-21 PROCEDURE — 99214 OFFICE O/P EST MOD 30 MIN: CPT | Mod: 25 | Performed by: FAMILY MEDICINE

## 2023-11-21 PROCEDURE — 3074F SYST BP LT 130 MM HG: CPT | Performed by: FAMILY MEDICINE

## 2023-11-21 PROCEDURE — 90746 HEPB VACCINE 3 DOSE ADULT IM: CPT | Performed by: FAMILY MEDICINE

## 2023-11-21 RX ORDER — ESCITALOPRAM OXALATE 5 MG/1
5 TABLET ORAL DAILY
Qty: 90 TABLET | Refills: 1 | Status: SHIPPED | OUTPATIENT
Start: 2023-11-21 | End: 2023-11-27

## 2023-11-21 RX ORDER — LEVALBUTEROL TARTRATE 45 UG/1
1-2 AEROSOL, METERED ORAL EVERY 4 HOURS PRN
Qty: 45 G | Refills: 2 | Status: SHIPPED | OUTPATIENT
Start: 2023-11-21

## 2023-11-21 RX ORDER — ESCITALOPRAM OXALATE 5 MG/1
5 TABLET ORAL DAILY
Qty: 90 TABLET | Refills: 1 | Status: SHIPPED | OUTPATIENT
Start: 2023-11-21 | End: 2023-11-21

## 2023-11-21 RX ORDER — PANTOPRAZOLE SODIUM 20 MG/1
20 TABLET, DELAYED RELEASE ORAL DAILY
COMMUNITY

## 2023-11-21 ASSESSMENT — FIBROSIS 4 INDEX: FIB4 SCORE: 2.09

## 2023-11-21 NOTE — PROGRESS NOTES
Subjective:   CC: Epigastric pain prediabetes follow-up    HPI:     Ila Bangura is a 78 y.o. female, established patient of the clinic.     Patient has chronic hiatal hernia, pyloric stenosis status post balloon dilation, GERD.  Patient has been experiencing persistent epigastric pain with foods.  Patient was previously taking famotidine 20 mg twice daily as she was not able to tolerate omeprazole.  Patient is working with local GI doctor. Famotidine was stopped. Patient was prescribed Protonix 20 mg daily by GI doctor.  However, she has not been taking Protonix daily as directed.   Patient has chronic prediabetes.  She has been working on dietary and lifestyle modification to improve this condition.  Her most recent A1c improves from 6.4 to 6.0  Patient has been cutting back on soda.    Patient has chronic anxiety disorder.  She was extremely resistant to pharmacotherapy.  She recently was seen by a psychiatrist.  She was prescribed Zoloft 25 mg daily and Vistaril 50 mg twice daily as needed.  However, she refuses to take these medications.  Patient also declined  referral.  Her  reports that her mental health is unstable.    Current medicines (including changes today)  Current Outpatient Medications   Medication Sig Dispense Refill    hydrOXYzine pamoate (VISTARIL) 50 MG Cap Take 50 mg by mouth 2 times a day as needed for Anxiety.      sertraline (ZOLOFT) 25 MG tablet Take 25 mg by mouth every day.      pantoprazole (PROTONIX) 20 MG tablet Take 20 mg by mouth every day.      levalbuterol (XOPENEX HFA) 45 MCG/ACT inhaler Inhale 1-2 Puffs every four hours as needed for Shortness of Breath. 45 g 2    estradiol (ESTRACE VAGINAL) 0.1 MG/GM vaginal cream Apply 1g cream inside vagina twice per week 1 Each 3    atorvastatin (LIPITOR) 10 MG Tab Take 1 Tablet by mouth every evening. 90 Tablet 3    losartan (COZAAR) 25 MG Tab Take 1 Tablet by mouth every day. 90 Tablet 3    beclomethasone HFA (QVAR  REDIHALER) 40 MCG/ACT inhaler Inhale 1 Puff 2 times a day. 10.6 g 5    Calcium Carbonate Antacid (TUMS PO) Take 1,000 mg by mouth 1 time a day as needed.      Multiple Vitamins-Minerals (CENTRUM SILVER ADULT 50+ PO) Take  by mouth.       No current facility-administered medications for this visit.     She  has a past medical history of Asthma, Diabetes (HCC), and GERD (gastroesophageal reflux disease).    I reviewed patient's problem list, allergies, medications, family hx, social hx with patient and update EPIC.        Objective:     /80 (BP Location: Left arm, Patient Position: Sitting, BP Cuff Size: Small adult)   Pulse 66   Temp 36.5 °C (97.7 °F) (Temporal)   Ht 1.524 m (5')   Wt 41.2 kg (90 lb 13.3 oz)   SpO2 97%  Body mass index is 17.74 kg/m².    Physical Exam:  Constitutional: awake, alert, in no distress.  Skin: Warm, dry, good turgor, no rashes, bruises, ulcers in visible areas.  Eye: conjunctiva clear, lids neg for edema or lesions.  ENMT: TM and auditory canals wnl. Oral and nasal mucosa wnl. Lips without lesions, good dentition, oropharynx clear.  Neck: Trachea midline, no masses, no thyromegaly. No cervical or supraclavicular lymphadenopathy  Respiratory: Unlabored respiratory effort, lungs clear to auscultation, no wheezes, no rales.  Cardiovascular: Normal S1, S2, no murmur, no pedal edema.  Psych: Oriented x3, affect and mood wnl, intact judgement and insight.       Assessment and Plan:   The following treatment plan was discussed    1. Hiatal hernia  2. Pyloric stenosis in adult s/p balloon dilation  3. Gastroesophageal reflux disease without esophagitis  Chronic, currently working with GI, patient was recently prescribed Protonix 20 mg daily.  However, patient has not been taking this medication regularly.  She continues to have intermittent epigastric discomfort.  Patient reports that she drinks soda and eats fast foods daily.  I had long discussion with patient regarding her  conditions.  Recommended consistent use of Protonix and follow-up with GI as directed.  Dietary modification discussed to control his condition.  Will continue to monitor.    4. Essential hypertension  Chronic, controlled with losartan 25 mg daily, no s/e reported, will continue.    - CBC WITH DIFFERENTIAL; Future  - Comp Metabolic Panel; Future  - MICROALBUMIN CREAT RATIO URINE; Future    5. Prediabetes  Chronic, A1c improved from 6.4 to 6.0 per recent labs.  Patient declined pharmacotherapy.  She will continue to work on dietary and lifestyle modification to control this condition.  Her  reports that she drinks soda and eats fast food almost daily.  Appropriate counseling provided.  Will continue to monitor.  - HEMOGLOBIN A1C; Future    6. Pure hypercholesterolemia  Chronic, controlled with Lipitor 10 mg daily, no s/e reported, will continue.    - Lipid Profile; Future    7. Mild persistent asthma without complication  Chronic, controlled with Qvar and Xopenex as needed, no s/e reported, will continue.    - levalbuterol (XOPENEX HFA) 45 MCG/ACT inhaler; Inhale 1-2 Puffs every four hours as needed for Shortness of Breath.  Dispense: 45 g; Refill: 2    8. YASH (generalized anxiety disorder)  Chronic, persistent anxiety.  Patient declined pharmacotherapy and behavioral therapy.  Patient was recently prescribed Zoloft and Vistaril by a local psychiatrist.  However, she refuses to take this medication.  Her mental health is unstable according to her .  I again had long conversation with patient regarding her symptoms and treatment.  Patient agreed to start Zoloft and Vistaril as directed.  She will continue to follow-up with her psychiatrist for management of this condition.    9. Need for vaccination  - Hepatitis B Vaccine Adult IM     Erin Treviño M.D.      Followup: Return in about 3 months (around 2/21/2024) for Multiple issues.    Please note that this dictation was created using voice recognition  software. I have made every reasonable attempt to correct obvious errors, but I expect that there are errors of grammar and possibly content that I did not discover before finalizing the note.

## 2023-11-27 RX ORDER — HYDROXYZINE PAMOATE 50 MG/1
50 CAPSULE ORAL 2 TIMES DAILY PRN
COMMUNITY
Start: 2023-10-13 | End: 2024-03-08

## 2023-11-27 RX ORDER — SERTRALINE HYDROCHLORIDE 25 MG/1
25 TABLET, FILM COATED ORAL DAILY
COMMUNITY
Start: 2023-10-13 | End: 2024-02-27

## 2023-11-29 ENCOUNTER — PATIENT MESSAGE (OUTPATIENT)
Dept: HEALTH INFORMATION MANAGEMENT | Facility: OTHER | Age: 78
End: 2023-11-29

## 2023-12-26 ENCOUNTER — APPOINTMENT (OUTPATIENT)
Dept: RADIOLOGY | Facility: MEDICAL CENTER | Age: 78
End: 2023-12-26
Attending: FAMILY MEDICINE
Payer: MEDICARE

## 2024-01-15 ENCOUNTER — HOSPITAL ENCOUNTER (OUTPATIENT)
Dept: RADIOLOGY | Facility: MEDICAL CENTER | Age: 79
End: 2024-01-15
Attending: FAMILY MEDICINE
Payer: MEDICARE

## 2024-01-15 DIAGNOSIS — Z12.31 VISIT FOR SCREENING MAMMOGRAM: ICD-10-CM

## 2024-01-15 PROCEDURE — 77067 SCR MAMMO BI INCL CAD: CPT

## 2024-02-14 DIAGNOSIS — E78.00 PURE HYPERCHOLESTEROLEMIA: ICD-10-CM

## 2024-02-15 RX ORDER — ATORVASTATIN CALCIUM 10 MG/1
10 TABLET, FILM COATED ORAL EVERY EVENING
Qty: 90 TABLET | Refills: 3 | Status: SHIPPED | OUTPATIENT
Start: 2024-02-15

## 2024-02-22 ENCOUNTER — HOSPITAL ENCOUNTER (OUTPATIENT)
Dept: LAB | Facility: MEDICAL CENTER | Age: 79
End: 2024-02-22
Attending: FAMILY MEDICINE
Payer: MEDICARE

## 2024-02-22 DIAGNOSIS — I10 ESSENTIAL HYPERTENSION: ICD-10-CM

## 2024-02-22 DIAGNOSIS — E78.00 PURE HYPERCHOLESTEROLEMIA: ICD-10-CM

## 2024-02-22 DIAGNOSIS — R73.03 PREDIABETES: ICD-10-CM

## 2024-02-22 LAB
ALBUMIN SERPL BCP-MCNC: 4.1 G/DL (ref 3.2–4.9)
ALBUMIN/GLOB SERPL: 1.5 G/DL
ALP SERPL-CCNC: 78 U/L (ref 30–99)
ALT SERPL-CCNC: 14 U/L (ref 2–50)
ANION GAP SERPL CALC-SCNC: 11 MMOL/L (ref 7–16)
AST SERPL-CCNC: 18 U/L (ref 12–45)
BASOPHILS # BLD AUTO: 1.6 % (ref 0–1.8)
BASOPHILS # BLD: 0.07 K/UL (ref 0–0.12)
BILIRUB SERPL-MCNC: 0.4 MG/DL (ref 0.1–1.5)
BUN SERPL-MCNC: 19 MG/DL (ref 8–22)
CALCIUM ALBUM COR SERPL-MCNC: 9.4 MG/DL (ref 8.5–10.5)
CALCIUM SERPL-MCNC: 9.5 MG/DL (ref 8.5–10.5)
CHLORIDE SERPL-SCNC: 106 MMOL/L (ref 96–112)
CHOLEST SERPL-MCNC: 179 MG/DL (ref 100–199)
CO2 SERPL-SCNC: 25 MMOL/L (ref 20–33)
CREAT SERPL-MCNC: 0.65 MG/DL (ref 0.5–1.4)
CREAT UR-MCNC: 94.55 MG/DL
EOSINOPHIL # BLD AUTO: 0.29 K/UL (ref 0–0.51)
EOSINOPHIL NFR BLD: 6.7 % (ref 0–6.9)
ERYTHROCYTE [DISTWIDTH] IN BLOOD BY AUTOMATED COUNT: 40.4 FL (ref 35.9–50)
EST. AVERAGE GLUCOSE BLD GHB EST-MCNC: 111 MG/DL
GFR SERPLBLD CREATININE-BSD FMLA CKD-EPI: 89 ML/MIN/1.73 M 2
GLOBULIN SER CALC-MCNC: 2.8 G/DL (ref 1.9–3.5)
GLUCOSE SERPL-MCNC: 107 MG/DL (ref 65–99)
HBA1C MFR BLD: 5.5 % (ref 4–5.6)
HCT VFR BLD AUTO: 45.2 % (ref 37–47)
HDLC SERPL-MCNC: 68 MG/DL
HGB BLD-MCNC: 14.9 G/DL (ref 12–16)
IMM GRANULOCYTES # BLD AUTO: 0.01 K/UL (ref 0–0.11)
IMM GRANULOCYTES NFR BLD AUTO: 0.2 % (ref 0–0.9)
LDLC SERPL CALC-MCNC: 100 MG/DL
LYMPHOCYTES # BLD AUTO: 1.9 K/UL (ref 1–4.8)
LYMPHOCYTES NFR BLD: 43.6 % (ref 22–41)
MCH RBC QN AUTO: 31.7 PG (ref 27–33)
MCHC RBC AUTO-ENTMCNC: 33 G/DL (ref 32.2–35.5)
MCV RBC AUTO: 96.2 FL (ref 81.4–97.8)
MICROALBUMIN UR-MCNC: <1.2 MG/DL
MICROALBUMIN/CREAT UR: NORMAL MG/G (ref 0–30)
MONOCYTES # BLD AUTO: 0.38 K/UL (ref 0–0.85)
MONOCYTES NFR BLD AUTO: 8.7 % (ref 0–13.4)
NEUTROPHILS # BLD AUTO: 1.71 K/UL (ref 1.82–7.42)
NEUTROPHILS NFR BLD: 39.2 % (ref 44–72)
NRBC # BLD AUTO: 0 K/UL
NRBC BLD-RTO: 0 /100 WBC (ref 0–0.2)
PLATELET # BLD AUTO: 226 K/UL (ref 164–446)
PMV BLD AUTO: 9.4 FL (ref 9–12.9)
POTASSIUM SERPL-SCNC: 4.9 MMOL/L (ref 3.6–5.5)
PROT SERPL-MCNC: 6.9 G/DL (ref 6–8.2)
RBC # BLD AUTO: 4.7 M/UL (ref 4.2–5.4)
SODIUM SERPL-SCNC: 142 MMOL/L (ref 135–145)
TRIGL SERPL-MCNC: 56 MG/DL (ref 0–149)
WBC # BLD AUTO: 4.4 K/UL (ref 4.8–10.8)

## 2024-02-22 PROCEDURE — 80061 LIPID PANEL: CPT

## 2024-02-22 PROCEDURE — 36415 COLL VENOUS BLD VENIPUNCTURE: CPT

## 2024-02-22 PROCEDURE — 82570 ASSAY OF URINE CREATININE: CPT

## 2024-02-22 PROCEDURE — 83036 HEMOGLOBIN GLYCOSYLATED A1C: CPT

## 2024-02-22 PROCEDURE — 80053 COMPREHEN METABOLIC PANEL: CPT

## 2024-02-22 PROCEDURE — 82043 UR ALBUMIN QUANTITATIVE: CPT

## 2024-02-22 PROCEDURE — 85025 COMPLETE CBC W/AUTO DIFF WBC: CPT

## 2024-02-23 ENCOUNTER — APPOINTMENT (OUTPATIENT)
Dept: RADIOLOGY | Facility: MEDICAL CENTER | Age: 79
End: 2024-02-23
Attending: EMERGENCY MEDICINE
Payer: MEDICARE

## 2024-02-23 ENCOUNTER — HOSPITAL ENCOUNTER (EMERGENCY)
Facility: MEDICAL CENTER | Age: 79
End: 2024-02-24
Attending: EMERGENCY MEDICINE
Payer: MEDICARE

## 2024-02-23 DIAGNOSIS — F03.918 DEMENTIA WITH BEHAVIORAL DISTURBANCE (HCC): ICD-10-CM

## 2024-02-23 LAB
ALBUMIN SERPL BCP-MCNC: 3.8 G/DL (ref 3.2–4.9)
ALBUMIN/GLOB SERPL: 1.2 G/DL
ALP SERPL-CCNC: 76 U/L (ref 30–99)
ALT SERPL-CCNC: 13 U/L (ref 2–50)
ANION GAP SERPL CALC-SCNC: 14 MMOL/L (ref 7–16)
APPEARANCE UR: CLEAR
AST SERPL-CCNC: 27 U/L (ref 12–45)
BASOPHILS # BLD AUTO: 1.2 % (ref 0–1.8)
BASOPHILS # BLD: 0.06 K/UL (ref 0–0.12)
BILIRUB SERPL-MCNC: 0.2 MG/DL (ref 0.1–1.5)
BILIRUB UR QL STRIP.AUTO: NEGATIVE
BUN SERPL-MCNC: 19 MG/DL (ref 8–22)
CALCIUM ALBUM COR SERPL-MCNC: 9.1 MG/DL (ref 8.5–10.5)
CALCIUM SERPL-MCNC: 8.9 MG/DL (ref 8.5–10.5)
CHLORIDE SERPL-SCNC: 108 MMOL/L (ref 96–112)
CO2 SERPL-SCNC: 20 MMOL/L (ref 20–33)
COLOR UR: YELLOW
CREAT SERPL-MCNC: 0.57 MG/DL (ref 0.5–1.4)
EKG IMPRESSION: NORMAL
EOSINOPHIL # BLD AUTO: 0.16 K/UL (ref 0–0.51)
EOSINOPHIL NFR BLD: 3.1 % (ref 0–6.9)
ERYTHROCYTE [DISTWIDTH] IN BLOOD BY AUTOMATED COUNT: 38.5 FL (ref 35.9–50)
GFR SERPLBLD CREATININE-BSD FMLA CKD-EPI: 92 ML/MIN/1.73 M 2
GLOBULIN SER CALC-MCNC: 3.1 G/DL (ref 1.9–3.5)
GLUCOSE SERPL-MCNC: 120 MG/DL (ref 65–99)
GLUCOSE UR STRIP.AUTO-MCNC: NEGATIVE MG/DL
HCT VFR BLD AUTO: 41.3 % (ref 37–47)
HGB BLD-MCNC: 14.1 G/DL (ref 12–16)
IMM GRANULOCYTES # BLD AUTO: 0.01 K/UL (ref 0–0.11)
IMM GRANULOCYTES NFR BLD AUTO: 0.2 % (ref 0–0.9)
KETONES UR STRIP.AUTO-MCNC: NEGATIVE MG/DL
LEUKOCYTE ESTERASE UR QL STRIP.AUTO: NEGATIVE
LYMPHOCYTES # BLD AUTO: 1.63 K/UL (ref 1–4.8)
LYMPHOCYTES NFR BLD: 31.9 % (ref 22–41)
MCH RBC QN AUTO: 32 PG (ref 27–33)
MCHC RBC AUTO-ENTMCNC: 34.1 G/DL (ref 32.2–35.5)
MCV RBC AUTO: 93.9 FL (ref 81.4–97.8)
MICRO URNS: NORMAL
MONOCYTES # BLD AUTO: 0.4 K/UL (ref 0–0.85)
MONOCYTES NFR BLD AUTO: 7.8 % (ref 0–13.4)
NEUTROPHILS # BLD AUTO: 2.85 K/UL (ref 1.82–7.42)
NEUTROPHILS NFR BLD: 55.8 % (ref 44–72)
NITRITE UR QL STRIP.AUTO: NEGATIVE
NRBC # BLD AUTO: 0 K/UL
NRBC BLD-RTO: 0 /100 WBC (ref 0–0.2)
PH UR STRIP.AUTO: 6.5 [PH] (ref 5–8)
PLATELET # BLD AUTO: 216 K/UL (ref 164–446)
PMV BLD AUTO: 9.2 FL (ref 9–12.9)
POTASSIUM SERPL-SCNC: 4 MMOL/L (ref 3.6–5.5)
PROT SERPL-MCNC: 6.9 G/DL (ref 6–8.2)
PROT UR QL STRIP: NEGATIVE MG/DL
RBC # BLD AUTO: 4.4 M/UL (ref 4.2–5.4)
RBC UR QL AUTO: NEGATIVE
SODIUM SERPL-SCNC: 142 MMOL/L (ref 135–145)
SP GR UR STRIP.AUTO: 1.02
UROBILINOGEN UR STRIP.AUTO-MCNC: 0.2 MG/DL
WBC # BLD AUTO: 5.1 K/UL (ref 4.8–10.8)

## 2024-02-23 PROCEDURE — 80053 COMPREHEN METABOLIC PANEL: CPT

## 2024-02-23 PROCEDURE — 700111 HCHG RX REV CODE 636 W/ 250 OVERRIDE (IP): Mod: JZ | Performed by: EMERGENCY MEDICINE

## 2024-02-23 PROCEDURE — 81003 URINALYSIS AUTO W/O SCOPE: CPT

## 2024-02-23 PROCEDURE — 96372 THER/PROPH/DIAG INJ SC/IM: CPT

## 2024-02-23 PROCEDURE — 99285 EMERGENCY DEPT VISIT HI MDM: CPT

## 2024-02-23 PROCEDURE — 85025 COMPLETE CBC W/AUTO DIFF WBC: CPT

## 2024-02-23 PROCEDURE — 36415 COLL VENOUS BLD VENIPUNCTURE: CPT

## 2024-02-23 PROCEDURE — 93005 ELECTROCARDIOGRAM TRACING: CPT

## 2024-02-23 RX ORDER — HALOPERIDOL 5 MG/ML
5 INJECTION INTRAMUSCULAR ONCE
Status: COMPLETED | OUTPATIENT
Start: 2024-02-23 | End: 2024-02-23

## 2024-02-23 RX ADMIN — HALOPERIDOL LACTATE 5 MG: 5 INJECTION, SOLUTION INTRAMUSCULAR at 16:18

## 2024-02-23 ASSESSMENT — FIBROSIS 4 INDEX: FIB4 SCORE: 1.68
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
## 2024-02-24 VITALS
BODY MASS INDEX: 17.67 KG/M2 | OXYGEN SATURATION: 99 % | HEART RATE: 67 BPM | WEIGHT: 90 LBS | DIASTOLIC BLOOD PRESSURE: 83 MMHG | RESPIRATION RATE: 18 BRPM | TEMPERATURE: 97.9 F | HEIGHT: 60 IN | SYSTOLIC BLOOD PRESSURE: 148 MMHG

## 2024-02-24 PROCEDURE — 700102 HCHG RX REV CODE 250 W/ 637 OVERRIDE(OP): Performed by: STUDENT IN AN ORGANIZED HEALTH CARE EDUCATION/TRAINING PROGRAM

## 2024-02-24 PROCEDURE — A9270 NON-COVERED ITEM OR SERVICE: HCPCS | Performed by: STUDENT IN AN ORGANIZED HEALTH CARE EDUCATION/TRAINING PROGRAM

## 2024-02-24 RX ORDER — FLUTICASONE PROPIONATE 44 UG/1
2 AEROSOL, METERED RESPIRATORY (INHALATION) EVERY 12 HOURS
Status: DISCONTINUED | OUTPATIENT
Start: 2024-02-24 | End: 2024-02-24 | Stop reason: HOSPADM

## 2024-02-24 RX ORDER — SERTRALINE HYDROCHLORIDE 25 MG/1
25 TABLET, FILM COATED ORAL DAILY
Status: DISCONTINUED | OUTPATIENT
Start: 2024-02-24 | End: 2024-02-24 | Stop reason: HOSPADM

## 2024-02-24 RX ORDER — OMEPRAZOLE 20 MG/1
20 CAPSULE, DELAYED RELEASE ORAL DAILY
Status: DISCONTINUED | OUTPATIENT
Start: 2024-02-24 | End: 2024-02-24 | Stop reason: HOSPADM

## 2024-02-24 RX ORDER — ATORVASTATIN CALCIUM 10 MG/1
10 TABLET, FILM COATED ORAL EVERY EVENING
Status: DISCONTINUED | OUTPATIENT
Start: 2024-02-24 | End: 2024-02-24 | Stop reason: HOSPADM

## 2024-02-24 RX ORDER — ALBUTEROL SULFATE 90 UG/1
1 AEROSOL, METERED RESPIRATORY (INHALATION) EVERY 4 HOURS PRN
Status: DISCONTINUED | OUTPATIENT
Start: 2024-02-24 | End: 2024-02-24 | Stop reason: HOSPADM

## 2024-02-24 RX ORDER — LOSARTAN POTASSIUM 25 MG/1
25 TABLET ORAL DAILY
Status: DISCONTINUED | OUTPATIENT
Start: 2024-02-24 | End: 2024-02-24 | Stop reason: HOSPADM

## 2024-02-24 NOTE — ED NOTES
Report from Sathish SPENCER. Pt resting comfortably, NAD noted, respirations even and unlabored. Telesitter in direct view of pt. No supplemental O2 se at this time.
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
## 2024-02-24 NOTE — ED NOTES
Assist RN: Pt started yelling and screaming about her children. She was not redirectable. Security assisted in getting her back into her room. MD at bedside and order for medications received.

## 2024-02-24 NOTE — DISCHARGE INSTRUCTIONS
Return to the ER at any time if you need help with placement.     Results for orders placed or performed during the hospital encounter of 02/23/24   CBC WITH DIFFERENTIAL   Result Value Ref Range    WBC 5.1 4.8 - 10.8 K/uL    RBC 4.40 4.20 - 5.40 M/uL    Hemoglobin 14.1 12.0 - 16.0 g/dL    Hematocrit 41.3 37.0 - 47.0 %    MCV 93.9 81.4 - 97.8 fL    MCH 32.0 27.0 - 33.0 pg    MCHC 34.1 32.2 - 35.5 g/dL    RDW 38.5 35.9 - 50.0 fL    Platelet Count 216 164 - 446 K/uL    MPV 9.2 9.0 - 12.9 fL    Neutrophils-Polys 55.80 44.00 - 72.00 %    Lymphocytes 31.90 22.00 - 41.00 %    Monocytes 7.80 0.00 - 13.40 %    Eosinophils 3.10 0.00 - 6.90 %    Basophils 1.20 0.00 - 1.80 %    Immature Granulocytes 0.20 0.00 - 0.90 %    Nucleated RBC 0.00 0.00 - 0.20 /100 WBC    Neutrophils (Absolute) 2.85 1.82 - 7.42 K/uL    Lymphs (Absolute) 1.63 1.00 - 4.80 K/uL    Monos (Absolute) 0.40 0.00 - 0.85 K/uL    Eos (Absolute) 0.16 0.00 - 0.51 K/uL    Baso (Absolute) 0.06 0.00 - 0.12 K/uL    Immature Granulocytes (abs) 0.01 0.00 - 0.11 K/uL    NRBC (Absolute) 0.00 K/uL   COMP METABOLIC PANEL   Result Value Ref Range    Sodium 142 135 - 145 mmol/L    Potassium 4.0 3.6 - 5.5 mmol/L    Chloride 108 96 - 112 mmol/L    Co2 20 20 - 33 mmol/L    Anion Gap 14.0 7.0 - 16.0    Glucose 120 (H) 65 - 99 mg/dL    Bun 19 8 - 22 mg/dL    Creatinine 0.57 0.50 - 1.40 mg/dL    Calcium 8.9 8.5 - 10.5 mg/dL    Correct Calcium 9.1 8.5 - 10.5 mg/dL    AST(SGOT) 27 12 - 45 U/L    ALT(SGPT) 13 2 - 50 U/L    Alkaline Phosphatase 76 30 - 99 U/L    Total Bilirubin 0.2 0.1 - 1.5 mg/dL    Albumin 3.8 3.2 - 4.9 g/dL    Total Protein 6.9 6.0 - 8.2 g/dL    Globulin 3.1 1.9 - 3.5 g/dL    A-G Ratio 1.2 g/dL   URINALYSIS (UA)    Specimen: Urine   Result Value Ref Range    Color Yellow     Character Clear     Specific Gravity 1.021 <1.035    Ph 6.5 5.0 - 8.0    Glucose Negative Negative mg/dL    Ketones Negative Negative mg/dL    Protein Negative Negative mg/dL    Bilirubin  Negative Negative    Urobilinogen, Urine 0.2 Negative    Nitrite Negative Negative    Leukocyte Esterase Negative Negative    Occult Blood Negative Negative    Micro Urine Req see below    ESTIMATED GFR   Result Value Ref Range    GFR (CKD-EPI) 92 >60 mL/min/1.73 m 2   EKG (NOW)   Result Value Ref Range    Report       [facility] Emergency Dept.    Test Date:  2024  Pt Name:    TERRELL MULLER                Department: ER  MRN:        [id number]                      Room:       [facility]  Gender:     Female                       Technician: [id number]  :        1945                   Requested By:BLAKE TORRES  Order #:    [id number]                    Reading MD: BLAKE TORRES MD    Measurements  Intervals                                Axis  Rate:       76                           P:          52  MI:         145                          QRS:        42  QRSD:       116                          T:          45  QT:         387  QTc:        436    Interpretive Statements  Sinus rhythm rate of 76, normal QRS and MI interval, significant artifact but  no obvious T wave or ST segment deviations.  My impression of this EKG: No  acute ischemia  Electronically Signed On 2024 19:36:12 PST by BLAKE TORRES MD

## 2024-02-24 NOTE — ED NOTES
Pt resting, symmetrical chest rise and noted. NAD noted. S/o at bedside. Tele sitter in direct view of pt

## 2024-02-24 NOTE — ED NOTES
Patient ambulated to bathroom, urine collected and sent.  has arrived at the hospital. He is very tearful. He has brought her food and her home medication. Patient walked past him in the hallway and did not recognize him. ERP sat with , discussing plan of care.

## 2024-02-24 NOTE — ED NOTES
"Labs collected and sent. Patient did not tolerate blood draw well. Patient screaming \"You're killing me! You just want me dead! That's all my blood!\"  "

## 2024-02-24 NOTE — ED NOTES
Assist RN: Telesitter contacted by this RN for safety. Pending telesitter to bedside for fall risk/safety concerns.

## 2024-02-24 NOTE — ED NOTES
Pt able to ambulate to restroom with RN standing by. Pt redirected on POC. Tele sitter in place at bedside.

## 2024-02-24 NOTE — ED NOTES
Spoke with patient at length on reason for staying in hospital. Pt is redirectable but forgetful. Tele sitter now in place and tele sitter is aware of pt elopement risk.

## 2024-02-24 NOTE — ED NOTES
Patient's home medications have been reviewed by the pharmacy team.     Past Medical History:   Diagnosis Date    Asthma     Diabetes (HCC)     borderline    GERD (gastroesophageal reflux disease)     acid reflux       Patient's Medications   New Prescriptions    No medications on file   Previous Medications    ATORVASTATIN (LIPITOR) 10 MG TAB    TAKE 1 TABLET BY MOUTH EVERY EVENING    BECLOMETHASONE HFA (QVAR REDIHALER) 40 MCG/ACT INHALER    Inhale 1 Puff 2 times a day.    CALCIUM CARBONATE ANTACID (TUMS PO)    Take 1,000 mg by mouth 1 time a day as needed.    ESTRADIOL (ESTRACE VAGINAL) 0.1 MG/GM VAGINAL CREAM    Apply 1g cream inside vagina twice per week    HYDROXYZINE PAMOATE (VISTARIL) 50 MG CAP    Take 50 mg by mouth 2 times a day as needed for Anxiety.    LEVALBUTEROL (XOPENEX HFA) 45 MCG/ACT INHALER    Inhale 1-2 Puffs every four hours as needed for Shortness of Breath.    LOSARTAN (COZAAR) 25 MG TAB    Take 1 Tablet by mouth every day.    MULTIPLE VITAMINS-MINERALS (CENTRUM SILVER ADULT 50+ PO)    Take  by mouth.    PANTOPRAZOLE (PROTONIX) 20 MG TABLET    Take 20 mg by mouth every day.    SERTRALINE (ZOLOFT) 25 MG TABLET    Take 25 mg by mouth every day.   Modified Medications    No medications on file   Discontinued Medications    No medications on file     A:  Medications do not appear to be contributing to current complaints.     P:    Monitor patient while she is in the ED.  Home medications have been reordered as appropriate.    Loyd Leblanc, SammyD, BCPS

## 2024-02-24 NOTE — ED NOTES
Bedside report received from off going RN/tech: Lidia SPENCER, assumed care of patient.  POC discussed with patient. Call light within reach, all needs addressed at this time.       Fall risk interventions in place: Move the patient closer to the nurse's station, Patient's personal possessions are with in their safe reach, Keep floor surfaces clean and dry, and Human-sitter if tele-sitter fails (all applicable per [place] Fall risk assessment)   Continuous monitoring: Pulse Ox or Blood Pressure  IVF/IV medications: Not Applicable   Oxygen: Room Air  Bedside sitter: 1:1 sitter in place for safety, pt placed on legal hold by PD on previous shift, however was discontinued by Markie.   Isolation: Not Applicable     at bedside with social work during report.       none

## 2024-02-24 NOTE — DISCHARGE PLANNING
Pt BIB [facility] on a legal hold for inability to care for self. Extensive documentation supports history of dementia and similar behaviors. Alert Team does not recommend that the legal hold be continued.

## 2024-02-24 NOTE — ED NOTES
Pt resting, respirations even and unlabored. NAD noted. Pt s/o at bedside requesting to speak to SW. DAISY Rojas notified; SW to come to bedside

## 2024-02-24 NOTE — ED NOTES
Pt resting comfortably, respirations even and unlabored. NAD noted. Call light within reach. Telesitter in direct view of pt

## 2024-02-24 NOTE — DISCHARGE PLANNING
SW met with Pt's Spouse outside of Pt room to discuss resources.  Spouse very tearful as he shared how difficult it is to care for his wife at home, but he stated that he wants to continue caring for her and does not want her placed.  SW offered resources such as caregivers, , [facility], group home placement, ect however Pt's Spouse does not feel it will help, or she will go.  Pt still sleeping due to medications given earlier so it was felt that the Pt's Spouse should go home and get some sleep this evening and come pick the Pt up tomorrow, which Spouse agreed. ERP and ER RN updated with plan.

## 2024-02-24 NOTE — ED NOTES
"VSS, pt states \"I never called the police you must be confused, I do not know why I am here and need to get back home.\" Pt redirected and updated on POC.    "

## 2024-02-24 NOTE — DISCHARGE PLANNING
Medical Social Work     SW met with the pt  Terry and he wanted to know if psychiatry  can see the pt to prescribe the pt medications. SW advised the pt  that we do not have psychiatry till Monday. The pts  asked if he can talk to the ERP about the pt medications. SW advised the ERP that the pts  had questions about the pt medications. The ERP will meet with the pt  at bedside.

## 2024-02-24 NOTE — ED NOTES
Tele sitter in place. Pt sleeping in bed, in no apparent distress. Equal chest rise and fall noted.

## 2024-02-24 NOTE — DISCHARGE SUMMARY
ED Observation Discharge Summary    Patient:Ila Bangura  Patient : 1945  Patient MRN: [id number]  Patient PCP: Erin Treviño M.D.    Admit Date: 2024  Discharge Date and Time: 24 7:30 AM  Discharge Diagnosis:   1. Dementia with behavioral disturbance (HCC)      Discharge Attending: Fabi Baires M.D.  Discharge Service: ED Observation    ED Course  Ila is a 79 y.o. female who was evaluated at [facility] for evaluation of abnormal behavior.  The patient was seen by my colleague, Dr. Mendoza, if the patient actually has a history of dementia and the patient's dementia has progressed to the point where she does not even recognize him.  Her care was signed out to me pending case management evaluation and  evaluation to see if placement would be an option.  The patient's  wanted to bring her home but plan was to have him go home and sleep for the evening to come back in the morning to continue discussions on placement versus taking her home.    02:30 AM  has seen the patient.  She states that her  wishes to take her home.  The patient's  has additional questions therefore I will go into them.     04:01 AM the patient was reevaluated bedside.  She is resting comfortably.  The patient's  is asking if there is any medication recommendations that I can make at this time for her behaviors.  I discussed with the patient's  that unfortunately I have unable to provide any medications such as antipsychotics and they would need to follow-up with psychiatry or their general practitioner.  He expresses understanding.  He states that he ultimately does want to bring the patient home and does not want her to be placed in a facility as he does not think that they are at that point yet.  He wants to let the patient sleep for a little while longer but will then take her home.    5:17 AM patient's  is ready for discharge home.  Patient is  ambulatory to the bathroom.  She is in no acute distress.  I did review lab work with the patient has been which is all very reassuring.  He is advised to have her follow-up with her outpatient providers.  He understands that he could always bring her back to the emergency room if he changes his mind and feels that she needs higher level of care.  He is agreeable to discharge plan with no further questions.    Discharge Exam:  BP (!) 148/83   Pulse 67   Temp 36.6 °C (97.9 °F) (Temporal)   Resp 18   Ht 1.524 m (5')   Wt 40.8 kg (90 lb)   LMP  (LMP Unknown)   SpO2 99%   BMI 17.58 kg/m² .    Constitutional: Awake and alert. Nontoxic  HENT:  Grossly normal  Eyes: Grossly normal  Neck: Normal range of motion  Cardiovascular: Normal heart rate   Thorax & Lungs: No respiratory distress  Abdomen: Nontender  Skin:  No pathologic rash.   Extremities: Well perfused  Psychiatric: Affect normal    Labs  Results for orders placed or performed during the hospital encounter of 02/23/24   CBC WITH DIFFERENTIAL   Result Value Ref Range    WBC 5.1 4.8 - 10.8 K/uL    RBC 4.40 4.20 - 5.40 M/uL    Hemoglobin 14.1 12.0 - 16.0 g/dL    Hematocrit 41.3 37.0 - 47.0 %    MCV 93.9 81.4 - 97.8 fL    MCH 32.0 27.0 - 33.0 pg    MCHC 34.1 32.2 - 35.5 g/dL    RDW 38.5 35.9 - 50.0 fL    Platelet Count 216 164 - 446 K/uL    MPV 9.2 9.0 - 12.9 fL    Neutrophils-Polys 55.80 44.00 - 72.00 %    Lymphocytes 31.90 22.00 - 41.00 %    Monocytes 7.80 0.00 - 13.40 %    Eosinophils 3.10 0.00 - 6.90 %    Basophils 1.20 0.00 - 1.80 %    Immature Granulocytes 0.20 0.00 - 0.90 %    Nucleated RBC 0.00 0.00 - 0.20 /100 WBC    Neutrophils (Absolute) 2.85 1.82 - 7.42 K/uL    Lymphs (Absolute) 1.63 1.00 - 4.80 K/uL    Monos (Absolute) 0.40 0.00 - 0.85 K/uL    Eos (Absolute) 0.16 0.00 - 0.51 K/uL    Baso (Absolute) 0.06 0.00 - 0.12 K/uL    Immature Granulocytes (abs) 0.01 0.00 - 0.11 K/uL    NRBC (Absolute) 0.00 K/uL   COMP METABOLIC PANEL   Result Value Ref Range     Sodium 142 135 - 145 mmol/L    Potassium 4.0 3.6 - 5.5 mmol/L    Chloride 108 96 - 112 mmol/L    Co2 20 20 - 33 mmol/L    Anion Gap 14.0 7.0 - 16.0    Glucose 120 (H) 65 - 99 mg/dL    Bun 19 8 - 22 mg/dL    Creatinine 0.57 0.50 - 1.40 mg/dL    Calcium 8.9 8.5 - 10.5 mg/dL    Correct Calcium 9.1 8.5 - 10.5 mg/dL    AST(SGOT) 27 12 - 45 U/L    ALT(SGPT) 13 2 - 50 U/L    Alkaline Phosphatase 76 30 - 99 U/L    Total Bilirubin 0.2 0.1 - 1.5 mg/dL    Albumin 3.8 3.2 - 4.9 g/dL    Total Protein 6.9 6.0 - 8.2 g/dL    Globulin 3.1 1.9 - 3.5 g/dL    A-G Ratio 1.2 g/dL   URINALYSIS (UA)    Specimen: Urine   Result Value Ref Range    Color Yellow     Character Clear     Specific Gravity 1.021 <1.035    Ph 6.5 5.0 - 8.0    Glucose Negative Negative mg/dL    Ketones Negative Negative mg/dL    Protein Negative Negative mg/dL    Bilirubin Negative Negative    Urobilinogen, Urine 0.2 Negative    Nitrite Negative Negative    Leukocyte Esterase Negative Negative    Occult Blood Negative Negative    Micro Urine Req see below    ESTIMATED GFR   Result Value Ref Range    GFR (CKD-EPI) 92 >60 mL/min/1.73 m 2   EKG (NOW)   Result Value Ref Range    Report       [facility] Emergency Dept.    Test Date:  2024  Pt Name:    TERRELL MULLER                Department: ER  MRN:        [id number]                      Room:       [facility]  Gender:     Female                       Technician: [id number]  :        1945                   Requested By:BLAKE TORRES  Order #:    [id number]                    Reading MD: BLAKE TORRES MD    Measurements  Intervals                                Axis  Rate:       76                           P:          52  OR:         145                          QRS:        42  QRSD:       116                          T:          45  QT:         387  QTc:        436    Interpretive Statements  Sinus rhythm rate of 76, normal QRS and OR interval, significant artifact but  no obvious T wave  or ST segment deviations.  My impression of this EKG: No  acute ischemia  Electronically Signed On 02- 19:36:12 PST by BLAKE TORRES MD         Radiology  No orders to display       Medications:   Discharge Medication List as of 2/24/2024  5:17 AM          My final assessment includes   1. Dementia with behavioral disturbance (HCC)        Upon Reevaluation, the patient's condition has: Improved; and will be discharged.    Patient discharged from ED Observation status at 05:10 (Time) 02/24/2024 (Date).     Total time spent on this ED Observation discharge encounter is < 30 Minutes    Electronically signed by: Fabi Baires M.D., 2/24/2024 7:30 AM

## 2024-02-27 ENCOUNTER — OFFICE VISIT (OUTPATIENT)
Dept: MEDICAL GROUP | Facility: MEDICAL CENTER | Age: 79
End: 2024-02-27
Payer: MEDICARE

## 2024-02-27 VITALS
OXYGEN SATURATION: 97 % | TEMPERATURE: 98.3 F | SYSTOLIC BLOOD PRESSURE: 144 MMHG | HEART RATE: 68 BPM | DIASTOLIC BLOOD PRESSURE: 80 MMHG | HEIGHT: 60 IN | BODY MASS INDEX: 18.74 KG/M2 | WEIGHT: 95.46 LBS

## 2024-02-27 DIAGNOSIS — F41.1 GAD (GENERALIZED ANXIETY DISORDER): ICD-10-CM

## 2024-02-27 DIAGNOSIS — E78.00 PURE HYPERCHOLESTEROLEMIA: ICD-10-CM

## 2024-02-27 DIAGNOSIS — F05 SUNDOWN SYNDROME: ICD-10-CM

## 2024-02-27 DIAGNOSIS — F03.911 DEMENTIA WITH AGITATION, UNSPECIFIED DEMENTIA SEVERITY, UNSPECIFIED DEMENTIA TYPE (HCC): ICD-10-CM

## 2024-02-27 DIAGNOSIS — R73.03 PREDIABETES: ICD-10-CM

## 2024-02-27 DIAGNOSIS — I10 ESSENTIAL HYPERTENSION: ICD-10-CM

## 2024-02-27 PROBLEM — M54.81 OCCIPITAL NEURALGIA OF LEFT SIDE: Status: RESOLVED | Noted: 2022-07-15 | Resolved: 2024-02-27

## 2024-02-27 PROCEDURE — 99214 OFFICE O/P EST MOD 30 MIN: CPT | Performed by: FAMILY MEDICINE

## 2024-02-27 PROCEDURE — 3077F SYST BP >= 140 MM HG: CPT | Performed by: FAMILY MEDICINE

## 2024-02-27 PROCEDURE — 3079F DIAST BP 80-89 MM HG: CPT | Performed by: FAMILY MEDICINE

## 2024-02-27 RX ORDER — ESCITALOPRAM OXALATE 10 MG/1
10 TABLET ORAL DAILY
Qty: 90 TABLET | Refills: 3 | Status: SHIPPED | OUTPATIENT
Start: 2024-02-27

## 2024-02-27 RX ORDER — QUETIAPINE FUMARATE 25 MG/1
25 TABLET, FILM COATED ORAL
Qty: 90 TABLET | Refills: 1 | Status: SHIPPED | OUTPATIENT
Start: 2024-02-27

## 2024-02-27 RX ORDER — LOSARTAN POTASSIUM 50 MG/1
50 TABLET ORAL DAILY
Qty: 90 TABLET | Refills: 3 | Status: SHIPPED | OUTPATIENT
Start: 2024-02-27

## 2024-02-27 ASSESSMENT — PATIENT HEALTH QUESTIONNAIRE - PHQ9: CLINICAL INTERPRETATION OF PHQ2 SCORE: 0

## 2024-02-27 ASSESSMENT — FIBROSIS 4 INDEX: FIB4 SCORE: 2.74
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
## 2024-03-08 ENCOUNTER — HOSPITAL ENCOUNTER (EMERGENCY)
Facility: MEDICAL CENTER | Age: 79
End: 2024-03-08
Attending: EMERGENCY MEDICINE
Payer: MEDICARE

## 2024-03-08 VITALS
DIASTOLIC BLOOD PRESSURE: 51 MMHG | OXYGEN SATURATION: 97 % | HEART RATE: 63 BPM | BODY MASS INDEX: 18.65 KG/M2 | HEIGHT: 60 IN | WEIGHT: 95 LBS | RESPIRATION RATE: 16 BRPM | TEMPERATURE: 97.5 F | SYSTOLIC BLOOD PRESSURE: 104 MMHG

## 2024-03-08 DIAGNOSIS — F03.C11 SEVERE DEMENTIA WITH AGITATION, UNSPECIFIED DEMENTIA TYPE (HCC): ICD-10-CM

## 2024-03-08 LAB
ALBUMIN SERPL BCP-MCNC: 4.3 G/DL (ref 3.2–4.9)
ALBUMIN/GLOB SERPL: 1.3 G/DL
ALP SERPL-CCNC: 80 U/L (ref 30–99)
ALT SERPL-CCNC: 13 U/L (ref 2–50)
AMPHET UR QL SCN: NEGATIVE
ANION GAP SERPL CALC-SCNC: 15 MMOL/L (ref 7–16)
APPEARANCE UR: CLEAR
AST SERPL-CCNC: 23 U/L (ref 12–45)
BARBITURATES UR QL SCN: NEGATIVE
BASOPHILS # BLD AUTO: 1.1 % (ref 0–1.8)
BASOPHILS # BLD: 0.08 K/UL (ref 0–0.12)
BENZODIAZ UR QL SCN: NEGATIVE
BILIRUB SERPL-MCNC: 0.5 MG/DL (ref 0.1–1.5)
BILIRUB UR QL STRIP.AUTO: NEGATIVE
BUN SERPL-MCNC: 18 MG/DL (ref 8–22)
BZE UR QL SCN: NEGATIVE
CALCIUM ALBUM COR SERPL-MCNC: 9.2 MG/DL (ref 8.5–10.5)
CALCIUM SERPL-MCNC: 9.4 MG/DL (ref 8.5–10.5)
CANNABINOIDS UR QL SCN: NEGATIVE
CHLORIDE SERPL-SCNC: 107 MMOL/L (ref 96–112)
CO2 SERPL-SCNC: 20 MMOL/L (ref 20–33)
COLOR UR: YELLOW
CREAT SERPL-MCNC: 0.53 MG/DL (ref 0.5–1.4)
EOSINOPHIL # BLD AUTO: 0.29 K/UL (ref 0–0.51)
EOSINOPHIL NFR BLD: 4 % (ref 0–6.9)
ERYTHROCYTE [DISTWIDTH] IN BLOOD BY AUTOMATED COUNT: 39.3 FL (ref 35.9–50)
ETHANOL BLD-MCNC: <10.1 MG/DL
FENTANYL UR QL: NEGATIVE
GFR SERPLBLD CREATININE-BSD FMLA CKD-EPI: 94 ML/MIN/1.73 M 2
GLOBULIN SER CALC-MCNC: 3.2 G/DL (ref 1.9–3.5)
GLUCOSE SERPL-MCNC: 94 MG/DL (ref 65–99)
GLUCOSE UR STRIP.AUTO-MCNC: NEGATIVE MG/DL
HCT VFR BLD AUTO: 45.8 % (ref 37–47)
HGB BLD-MCNC: 15.2 G/DL (ref 12–16)
IMM GRANULOCYTES # BLD AUTO: 0.03 K/UL (ref 0–0.11)
IMM GRANULOCYTES NFR BLD AUTO: 0.4 % (ref 0–0.9)
KETONES UR STRIP.AUTO-MCNC: 15 MG/DL
LEUKOCYTE ESTERASE UR QL STRIP.AUTO: NEGATIVE
LYMPHOCYTES # BLD AUTO: 2.39 K/UL (ref 1–4.8)
LYMPHOCYTES NFR BLD: 33.3 % (ref 22–41)
MCH RBC QN AUTO: 31.3 PG (ref 27–33)
MCHC RBC AUTO-ENTMCNC: 33.2 G/DL (ref 32.2–35.5)
MCV RBC AUTO: 94.4 FL (ref 81.4–97.8)
METHADONE UR QL SCN: NEGATIVE
MICRO URNS: ABNORMAL
MONOCYTES # BLD AUTO: 0.68 K/UL (ref 0–0.85)
MONOCYTES NFR BLD AUTO: 9.5 % (ref 0–13.4)
NEUTROPHILS # BLD AUTO: 3.7 K/UL (ref 1.82–7.42)
NEUTROPHILS NFR BLD: 51.7 % (ref 44–72)
NITRITE UR QL STRIP.AUTO: NEGATIVE
NRBC # BLD AUTO: 0 K/UL
NRBC BLD-RTO: 0 /100 WBC (ref 0–0.2)
OPIATES UR QL SCN: NEGATIVE
OXYCODONE UR QL SCN: NEGATIVE
PCP UR QL SCN: NEGATIVE
PH UR STRIP.AUTO: 6.5 [PH] (ref 5–8)
PLATELET # BLD AUTO: 251 K/UL (ref 164–446)
PMV BLD AUTO: 9.2 FL (ref 9–12.9)
POTASSIUM SERPL-SCNC: 4 MMOL/L (ref 3.6–5.5)
PROPOXYPH UR QL SCN: NEGATIVE
PROT SERPL-MCNC: 7.5 G/DL (ref 6–8.2)
PROT UR QL STRIP: NEGATIVE MG/DL
RBC # BLD AUTO: 4.85 M/UL (ref 4.2–5.4)
RBC UR QL AUTO: NEGATIVE
SODIUM SERPL-SCNC: 142 MMOL/L (ref 135–145)
SP GR UR STRIP.AUTO: 1.02
UROBILINOGEN UR STRIP.AUTO-MCNC: 0.2 MG/DL
WBC # BLD AUTO: 7.2 K/UL (ref 4.8–10.8)

## 2024-03-08 PROCEDURE — 80053 COMPREHEN METABOLIC PANEL: CPT

## 2024-03-08 PROCEDURE — 80307 DRUG TEST PRSMV CHEM ANLYZR: CPT

## 2024-03-08 PROCEDURE — 82077 ASSAY SPEC XCP UR&BREATH IA: CPT

## 2024-03-08 PROCEDURE — 99284 EMERGENCY DEPT VISIT MOD MDM: CPT

## 2024-03-08 PROCEDURE — 36415 COLL VENOUS BLD VENIPUNCTURE: CPT

## 2024-03-08 PROCEDURE — 81003 URINALYSIS AUTO W/O SCOPE: CPT

## 2024-03-08 PROCEDURE — A9270 NON-COVERED ITEM OR SERVICE: HCPCS | Performed by: EMERGENCY MEDICINE

## 2024-03-08 PROCEDURE — 700102 HCHG RX REV CODE 250 W/ 637 OVERRIDE(OP): Performed by: EMERGENCY MEDICINE

## 2024-03-08 PROCEDURE — 85025 COMPLETE CBC W/AUTO DIFF WBC: CPT

## 2024-03-08 RX ORDER — OLANZAPINE 5 MG/1
5 TABLET ORAL ONCE
Status: COMPLETED | OUTPATIENT
Start: 2024-03-08 | End: 2024-03-08

## 2024-03-08 RX ORDER — HALOPERIDOL 2 MG/1
2 TABLET ORAL ONCE
Status: COMPLETED | OUTPATIENT
Start: 2024-03-08 | End: 2024-03-08

## 2024-03-08 RX ADMIN — HALOPERIDOL 2 MG: 2 TABLET ORAL at 14:12

## 2024-03-08 RX ADMIN — OLANZAPINE 5 MG: 5 TABLET, FILM COATED ORAL at 19:00

## 2024-03-08 ASSESSMENT — FIBROSIS 4 INDEX: FIB4 SCORE: 2.74

## 2024-03-08 NOTE — ED NOTES
Patient changed into paper scrubs, belongings removed. Patient unable to provide urine sample at this time.

## 2024-03-08 NOTE — ED PROVIDER NOTES
"ER Provider Note    Scribed for Brannon Webster D.O. by Tami Bower. 3/8/2024  1:29 PM    Primary Care Provider: Erin Treviño M.D.    CHIEF COMPLAINT  Chief Complaint   Patient presents with    Suicidal Ideation     BIB EMS from [facility] for suicidal ideation. Per [employer] pt repeatedly stated she wants to die on her own accord and is worried her  wants to kill her. Hx of dementia.      HPI/ROS  LIMITATION TO HISTORY   Patient is confused    OUTSIDE HISTORIAN(S):  EMS report and nurse report    Ila Bangura is a 79 y.o. female with a history of dementia who presents to the Emergency Department via EMS for suicidal ideation. Patient was brought in from [facility] for further evaluation by [employer]. Per [employer], patient reportedly expressed wanting to die and is worried her  wants to kill her. At bedside patient is unsure why she is here. She states her and her  got in an argument and was told \"stop it I'm gong to kill you\". Patient is aware of the year, age, but is unsure of the city. Patient denies suicidal ideation or homicidal ideation. Per nurse report, patient's  was instructed not to come to ED due to possibly worsening the situation of the patient's state.     ROS as per HPI.    PAST MEDICAL HISTORY  Past Medical History:   Diagnosis Date    Asthma     Diabetes (HCC)     borderline    GERD (gastroesophageal reflux disease)     acid reflux     SURGICAL HISTORY  Past Surgical History:   Procedure Laterality Date    DENTAL SURGERY  2016    bone graft    COLONOSCOPY      negative    UPPER OR LOWER GI PROCEDURE WITH ANESTHESIA      x 2 upper GI     FAMILY HISTORY  Family History   Problem Relation Age of Onset    Other Mother         Intestinal Problem    Diabetes Mother     Cancer Father         Stomach Cancer    Lung Disease Father         Smoker    Cancer Sister         breast cancer/mastectomy    Diabetes Sister     Diabetes Brother     Cancer Maternal Grandmother         bone cancer    No Known " Problems Maternal Grandfather     Diabetes Brother     No Known Problems Sister     No Known Problems Paternal Grandmother     No Known Problems Paternal Grandfather      SOCIAL HISTORY   reports that she has never smoked. She has never used smokeless tobacco. She reports that she does not drink alcohol and does not use drugs.    CURRENT MEDICATIONS  Previous Medications    ATORVASTATIN (LIPITOR) 10 MG TAB    TAKE 1 TABLET BY MOUTH EVERY EVENING    ESCITALOPRAM (LEXAPRO) 10 MG TAB    Take 1 Tablet by mouth every day.    LEVALBUTEROL (XOPENEX HFA) 45 MCG/ACT INHALER    Inhale 1-2 Puffs every four hours as needed for Shortness of Breath.    LOSARTAN (COZAAR) 50 MG TAB    Take 1 Tablet by mouth every day.    PANTOPRAZOLE (PROTONIX) 20 MG TABLET    Take 20 mg by mouth every day.    QUETIAPINE (SEROQUEL) 25 MG TAB    Take 1 Tablet by mouth 1/2 hour after dinner.     ALLERGIES  Augmentin and Amoxicillin-pot clavulanate    PHYSICAL EXAM  BP (!) 177/78   Pulse 70   Temp 36.2 °C (97.1 °F) (Temporal)   Resp 16   Ht 1.524 m (5')   Wt 43.1 kg (95 lb)   LMP  (LMP Unknown)   SpO2 94%   BMI 18.55 kg/m²     General: No acute distress.  HENT: Normocephalic, Mucus membranes are moist.   Chest: Lungs have even and unlabored respirations, Clear to auscultation.   Cardiovascular: Regular rate and regular rhythm, No peripheral cyanosis.  Abdomen: Non distended.  Neuro: Awake, Conversive, Oriented with person and year. Difficulty with city. Believes her son is 17 yrs old has no memory what brought her to ER today.   Psychiatric: Calm and cooperative.     EXTERNAL RECORDS REVIEWED  Review of patient's past medical records show patient was seen 2/23/24. Diagnosis with dementia with behavioral disturbance. Patient reported her  was trying to kill her.  Patient has chronic dementia, to the point where she does not recognize her . Last visit  was adamant to take patient home to care for her. However patient was  monitored overnight.     INITIAL ASSESSMENT  Patient has a history of dementia. She was placed on legal hold by police. She does not meet criteria due to dementia. Patient currently has no behavioral problems. She is still confused. Metabolic panel to evaluate for anemia, electrolyte imbalance or infection.    is not here presently,  have been contacted.     ED Observation Status? Yes; I am placing the patient in to an observation status due to a diagnostic uncertainty as well as therapeutic intensity. Patient placed in observation status at 1:31 PM, 3/8/2024.     Observation plan is as follows: Monitor patient for decreased agitation      DIAGNOSTIC STUDIES  Labs:  Results for orders placed or performed during the hospital encounter of 03/08/24   CBC WITH DIFFERENTIAL   Result Value Ref Range    WBC 7.2 4.8 - 10.8 K/uL    RBC 4.85 4.20 - 5.40 M/uL    Hemoglobin 15.2 12.0 - 16.0 g/dL    Hematocrit 45.8 37.0 - 47.0 %    MCV 94.4 81.4 - 97.8 fL    MCH 31.3 27.0 - 33.0 pg    MCHC 33.2 32.2 - 35.5 g/dL    RDW 39.3 35.9 - 50.0 fL    Platelet Count 251 164 - 446 K/uL    MPV 9.2 9.0 - 12.9 fL    Neutrophils-Polys 51.70 44.00 - 72.00 %    Lymphocytes 33.30 22.00 - 41.00 %    Monocytes 9.50 0.00 - 13.40 %    Eosinophils 4.00 0.00 - 6.90 %    Basophils 1.10 0.00 - 1.80 %    Immature Granulocytes 0.40 0.00 - 0.90 %    Nucleated RBC 0.00 0.00 - 0.20 /100 WBC    Neutrophils (Absolute) 3.70 1.82 - 7.42 K/uL    Lymphs (Absolute) 2.39 1.00 - 4.80 K/uL    Monos (Absolute) 0.68 0.00 - 0.85 K/uL    Eos (Absolute) 0.29 0.00 - 0.51 K/uL    Baso (Absolute) 0.08 0.00 - 0.12 K/uL    Immature Granulocytes (abs) 0.03 0.00 - 0.11 K/uL    NRBC (Absolute) 0.00 K/uL   COMP METABOLIC PANEL   Result Value Ref Range    Sodium 142 135 - 145 mmol/L    Potassium 4.0 3.6 - 5.5 mmol/L    Chloride 107 96 - 112 mmol/L    Co2 20 20 - 33 mmol/L    Anion Gap 15.0 7.0 - 16.0    Glucose 94 65 - 99 mg/dL    Bun 18 8 - 22 mg/dL     Creatinine 0.53 0.50 - 1.40 mg/dL    Calcium 9.4 8.5 - 10.5 mg/dL    Correct Calcium 9.2 8.5 - 10.5 mg/dL    AST(SGOT) 23 12 - 45 U/L    ALT(SGPT) 13 2 - 50 U/L    Alkaline Phosphatase 80 30 - 99 U/L    Total Bilirubin 0.5 0.1 - 1.5 mg/dL    Albumin 4.3 3.2 - 4.9 g/dL    Total Protein 7.5 6.0 - 8.2 g/dL    Globulin 3.2 1.9 - 3.5 g/dL    A-G Ratio 1.3 g/dL   DIAGNOSTIC ALCOHOL   Result Value Ref Range    Diagnostic Alcohol <10.1 <10.1 mg/dL   ESTIMATED GFR   Result Value Ref Range    GFR (CKD-EPI) 94 >60 mL/min/1.73 m 2     COURSE & MEDICAL DECISION MAKING     COURSE AND PLAN  1:31 PM - Patient was seen and evaluated at bedside. Patient presents to the ED for suicidal ideation. After my exam, I discussed with the patient the plan of care, which includes obtaining lab work for further evaluation. Patient understands and verbalizes agreement to plan of care. Ordered CBC w/ diff, CMP, UA, diagnostic alcohol and UDS to evaluate.     1:42 PM - Spoke with social work to discuss plan of care.     1:56 PM -  at bedside. He states today is the fifth time the patient has called the police on her. He states he does not want to place the patient in a group home. Patient has an upcoming brain MRI coming up and would like to continue attempting to care for her.  states patient is currently taking medications for anxiety, medication, cholesterol, hypertension and inhaler. She additionally is being seen by a psychiatrist however notes the patient does not want to return.  states patient has had episodes like this in the past.     4:15 PM - Patient evaluated by .  is away as patient is agitated by her prescience. She will be held in ER until symptoms have improved.     6:50 PM - Patient was reevaluated at bedside. Patient is still easily agitated. Will give additional medications.     ED Summary: Patient presented for from a restaurant, she got an argument with her , she called the  paramedics and said the  threatened her harm her.  And that she was considering suicide.  On arrival she denies saying these things.  When her  was here she did accuse him of saying that he told her that he would kill her if she does not stop.    The  denies this.  The patient has been here several times for similar episodes she has a history dementia, she has behavioral disorders and the patient is calm and cooperative but when the  comes by she gets agitated easily.  Last evaluation here the patient was given to the emergency department for an extended period of time given medications and then she was stable for discharge anticipate that this will be the case now.  She was medicated with Haldol.  She has had little effect from that so Zyprexa will be attempted to see if this can improve her acute psychosis.    The patient has been evaluated by , records were reviewed and the hope is that she will be able to calm down in the hospital be able to discharge her home.  In the meantime the patient is on ED observation and will be endorsed to the oncoming physician      DISPOSITION AND DISCUSSIONS    Discussion of management with other [facility] or appropriate source(s):      Barriers to care at this time, including but not limited to: Dementia    ED observation endorsed to the oncoming evening physician    FINAL DIAGNOSIS  1. Severe dementia with agitation, unspecified dementia type (HCC)        Tami BHANDARI (Evelia), am scribing for, and in the presence of, Brannon Webster D.O..    Electronically signed by: Tami Bower (Evelia), 3/8/2024    IBrannon D.O. personally performed the services described in this documentation, as scribed by Tami Bower in my presence, and it is both accurate and complete.     The note accurately reflects work and decisions made by me.  Brannon Webster D.O.  3/8/2024  7:16 PM

## 2024-03-08 NOTE — ED TRIAGE NOTES
Chief Complaint   Patient presents with    Suicidal Ideation     BIB EMS from [facility] for suicidal ideation. Per [employer] pt repeatedly stated she wants to die on her own accord and is worried her  wants to kill her. Hx of dementia.       Pt arrives on L2K by PD. Pt currently denies any SI/HI. States her and her  were having an argument, and doesn't understand why she is here. Pt A&Ox3, not oriented to place.

## 2024-03-08 NOTE — ED NOTES
Medication history reviewed with patients  at bedside along with home medication vials provided.   Med rec is complete  Allergies reviewed.   Patient has not had any outpatient antibiotics in the last 30 days.   Anticoagulants: No    Jhonny Ramirez

## 2024-03-09 NOTE — ED NOTES
Pt cleared for discharge by Dr. Torrez.  provided with discharge instructions with verbal understanding. VSS at discharge. [facility] Pt discharged to .

## 2024-03-09 NOTE — DISCHARGE PLANNING
MSW received  consult for pt and spouse. MSW familiar with case. Pt was in the ER a few weeks with similar concerns. Pt lives with spouse and has hx of Alzheimers. She is accusing  of trying to kill her. MSW met with pt's . Pt's  tearful and frustrated. Pt's  still does not want to pursue placement at this time and will bring her home. MSW offered resources as caregivers and talking the [employer] for support. MSW also encouraged  to look into Adult day services.   to leave to give pt and him a break. He will return around 6pm. Bedside RN and ERP updated.

## 2024-03-09 NOTE — DISCHARGE SUMMARY
ED Observation Discharge Summary    Patient:Ila Bangura  Patient : 1945  Patient MRN: [id number]  Patient PCP: Erin Treviño M.D.    Admit Date: 3/8/2024  Discharge Date and Time: 24 9:11 PM  Discharge Diagnosis: dementia, agitation  Discharge Attending: Royce Torrez M.D.  Discharge Service: ED Observation    ED Course  Ila is a 79 y.o. female who was evaluated at [facility] and was seen by my colleague.  Patient is known to the emergency department and has a history of dementia.  Have been brought in by [employer] after an altercation/verbal argument at a [employer] with her .  Occasionally she gets these episodes where she believes her  wants to kill her.   is at the bedside and patient has been evaluated by the previous ER physician but ED behavioral health team.  Do not believe that she is suitable for a legal hold noted they feel that there is significant health risk to the patient.  Plan at the time of signout was following administration of Haldol earlier and now oral Zyprexa they are making sure that she can tolerate p.o. intake, that  felt comfortable taking her home.       : Notified by nursing staff that patient has resting comfortably, has been eating and ambulating manage  is at the bedside.       : Discussed with  the current plan and he feels comfortable taking the patient home.  Discharged home in stable condition    Discharge Exam:  BP (!) 143/68   Pulse 60   Temp 36.2 °C (97.1 °F) (Temporal)   Resp 16   Ht 1.524 m (5')   Wt 43.1 kg (95 lb)   LMP  (LMP Unknown)   SpO2 98%   BMI 18.55 kg/m² .    Constitutional: Awake and alert. Nontoxic  HENT:  Grossly normal  Eyes: Grossly normal  Neck: Normal range of motion  Cardiovascular: Normal heart rate   Thorax & Lungs: No respiratory distress  Abdomen: Nontender  Skin:  No pathologic rash.   Extremities: Well perfused  Psychiatric: Affect normal    Labs  Results for orders placed or performed  during the hospital encounter of 03/08/24   CBC WITH DIFFERENTIAL   Result Value Ref Range    WBC 7.2 4.8 - 10.8 K/uL    RBC 4.85 4.20 - 5.40 M/uL    Hemoglobin 15.2 12.0 - 16.0 g/dL    Hematocrit 45.8 37.0 - 47.0 %    MCV 94.4 81.4 - 97.8 fL    MCH 31.3 27.0 - 33.0 pg    MCHC 33.2 32.2 - 35.5 g/dL    RDW 39.3 35.9 - 50.0 fL    Platelet Count 251 164 - 446 K/uL    MPV 9.2 9.0 - 12.9 fL    Neutrophils-Polys 51.70 44.00 - 72.00 %    Lymphocytes 33.30 22.00 - 41.00 %    Monocytes 9.50 0.00 - 13.40 %    Eosinophils 4.00 0.00 - 6.90 %    Basophils 1.10 0.00 - 1.80 %    Immature Granulocytes 0.40 0.00 - 0.90 %    Nucleated RBC 0.00 0.00 - 0.20 /100 WBC    Neutrophils (Absolute) 3.70 1.82 - 7.42 K/uL    Lymphs (Absolute) 2.39 1.00 - 4.80 K/uL    Monos (Absolute) 0.68 0.00 - 0.85 K/uL    Eos (Absolute) 0.29 0.00 - 0.51 K/uL    Baso (Absolute) 0.08 0.00 - 0.12 K/uL    Immature Granulocytes (abs) 0.03 0.00 - 0.11 K/uL    NRBC (Absolute) 0.00 K/uL   COMP METABOLIC PANEL   Result Value Ref Range    Sodium 142 135 - 145 mmol/L    Potassium 4.0 3.6 - 5.5 mmol/L    Chloride 107 96 - 112 mmol/L    Co2 20 20 - 33 mmol/L    Anion Gap 15.0 7.0 - 16.0    Glucose 94 65 - 99 mg/dL    Bun 18 8 - 22 mg/dL    Creatinine 0.53 0.50 - 1.40 mg/dL    Calcium 9.4 8.5 - 10.5 mg/dL    Correct Calcium 9.2 8.5 - 10.5 mg/dL    AST(SGOT) 23 12 - 45 U/L    ALT(SGPT) 13 2 - 50 U/L    Alkaline Phosphatase 80 30 - 99 U/L    Total Bilirubin 0.5 0.1 - 1.5 mg/dL    Albumin 4.3 3.2 - 4.9 g/dL    Total Protein 7.5 6.0 - 8.2 g/dL    Globulin 3.2 1.9 - 3.5 g/dL    A-G Ratio 1.3 g/dL   URINALYSIS CULTURE, IF INDICATED    Specimen: Urine   Result Value Ref Range    Color Yellow     Character Clear     Specific Gravity 1.016 <1.035    Ph 6.5 5.0 - 8.0    Glucose Negative Negative mg/dL    Ketones 15 (A) Negative mg/dL    Protein Negative Negative mg/dL    Bilirubin Negative Negative    Urobilinogen, Urine 0.2 Negative    Nitrite Negative Negative    Leukocyte  Esterase Negative Negative    Occult Blood Negative Negative    Micro Urine Req see below    DIAGNOSTIC ALCOHOL   Result Value Ref Range    Diagnostic Alcohol <10.1 <10.1 mg/dL   URINE DRUG SCREEN   Result Value Ref Range    Amphetamines Urine Negative Negative    Barbiturates Negative Negative    Benzodiazepines Negative Negative    Cocaine Metabolite Negative Negative    Fentanyl, Urine Negative Negative    Methadone Negative Negative    Opiates Negative Negative    Oxycodone Negative Negative    Phencyclidine -Pcp Negative Negative    Propoxyphene Negative Negative    Cannabinoid Metab Negative Negative   ESTIMATED GFR   Result Value Ref Range    GFR (CKD-EPI) 94 >60 mL/min/1.73 m 2     Radiology  No orders to display     Medications:   New Prescriptions    No medications on file     My final assessment includes discussion of the patient's condition and plan with  and son.  Upon Reevaluation, the patient's condition has: Improved; and will be discharged.    Patient discharged from ED Observation status at 2115 (Time) 3/8/24 (Date).     Total time spent on this ED Observation discharge encounter is > 30 Minutes    Electronically signed by: Royce Torrez M.D., 3/8/2024 9:11 PM

## 2024-03-09 NOTE — ED NOTES
Bedside report to JOHANNA Hoover. Plan of care discussed with patient. Bed locked and in lowest position. Call light available and within reach. Appropriate fall precautions in place. No distress noted. This RN removed from care.

## 2024-03-09 NOTE — ED NOTES
Bedside report received from JOHANNA Lowe assumed care of patient.    Fall risk interventions in place: Move the patient closer to the nurse's station, Patient's personal possessions are with in their safe reach, Place socks on patient, Place fall risk sign on patient's door, Keep floor surfaces clean and dry, and Accompanied to restroom (all applicable per [facility] Fall risk assessment)   Continuous monitoring: Cardiac Leads, Pulse Ox, or Blood Pressure  IVF/IV medications: Not Applicable   Oxygen: Room Air  Bedside sitter: Safety sitter  Isolation: Not Applicable

## 2024-04-12 ENCOUNTER — APPOINTMENT (OUTPATIENT)
Dept: MEDICAL GROUP | Facility: MEDICAL CENTER | Age: 79
End: 2024-04-12
Payer: MEDICARE

## 2024-05-12 DIAGNOSIS — F41.1 GAD (GENERALIZED ANXIETY DISORDER): ICD-10-CM

## 2024-05-13 RX ORDER — ESCITALOPRAM OXALATE 5 MG/1
5 TABLET ORAL
Qty: 90 TABLET | Refills: 0 | OUTPATIENT
Start: 2024-05-13

## 2024-05-13 RX ORDER — ESCITALOPRAM OXALATE 10 MG/1
10 TABLET ORAL DAILY
Qty: 90 TABLET | Refills: 3 | Status: SHIPPED | OUTPATIENT
Start: 2024-05-13

## 2024-06-19 ENCOUNTER — TELEPHONE (OUTPATIENT)
Dept: MEDICAL GROUP | Facility: MEDICAL CENTER | Age: 79
End: 2024-06-19
Payer: MEDICARE

## 2024-06-19 DIAGNOSIS — R35.89 POLYURIA: ICD-10-CM

## 2024-06-19 DIAGNOSIS — R63.1 POLYDIPSIA: ICD-10-CM

## 2024-06-19 DIAGNOSIS — Z13.1 ENCOUNTER FOR SCREENING FOR DIABETES MELLITUS: ICD-10-CM

## 2024-06-19 DIAGNOSIS — R73.03 PREDIABETES: ICD-10-CM

## 2024-06-19 NOTE — TELEPHONE ENCOUNTER
Patient  came in and requesting a lab order for glucose level. Patient  mentioned that the patient has been drinking a lot of sodas as if they were water.. Thank you.

## 2024-06-20 NOTE — TELEPHONE ENCOUNTER
I ordered A1c.  Please advise patient to have test done.  She does not need to fast for the blood test.  Erin Treviño M.D.

## 2024-07-17 ENCOUNTER — OFFICE VISIT (OUTPATIENT)
Dept: URGENT CARE | Facility: PHYSICIAN GROUP | Age: 79
End: 2024-07-17
Payer: MEDICARE

## 2024-07-17 ENCOUNTER — HOSPITAL ENCOUNTER (OUTPATIENT)
Dept: RADIOLOGY | Facility: MEDICAL CENTER | Age: 79
End: 2024-07-17
Attending: NURSE PRACTITIONER
Payer: MEDICARE

## 2024-07-17 VITALS
BODY MASS INDEX: 18.92 KG/M2 | DIASTOLIC BLOOD PRESSURE: 80 MMHG | WEIGHT: 106.8 LBS | SYSTOLIC BLOOD PRESSURE: 130 MMHG | HEIGHT: 63 IN | HEART RATE: 79 BPM | TEMPERATURE: 98.5 F | OXYGEN SATURATION: 95 % | RESPIRATION RATE: 16 BRPM

## 2024-07-17 DIAGNOSIS — R06.89 ABNORMAL BREATH SOUNDS: ICD-10-CM

## 2024-07-17 DIAGNOSIS — R05.1 ACUTE COUGH: ICD-10-CM

## 2024-07-17 DIAGNOSIS — J45.31 MILD PERSISTENT ASTHMA WITH ACUTE EXACERBATION: ICD-10-CM

## 2024-07-17 PROCEDURE — 99214 OFFICE O/P EST MOD 30 MIN: CPT | Performed by: NURSE PRACTITIONER

## 2024-07-17 PROCEDURE — 71046 X-RAY EXAM CHEST 2 VIEWS: CPT

## 2024-07-17 PROCEDURE — 3075F SYST BP GE 130 - 139MM HG: CPT | Performed by: NURSE PRACTITIONER

## 2024-07-17 PROCEDURE — 3079F DIAST BP 80-89 MM HG: CPT | Performed by: NURSE PRACTITIONER

## 2024-07-17 RX ORDER — PREDNISONE 20 MG/1
40 TABLET ORAL DAILY
Qty: 10 TABLET | Refills: 0 | Status: SHIPPED | OUTPATIENT
Start: 2024-07-17 | End: 2024-07-22

## 2024-07-17 RX ORDER — BENZONATATE 100 MG/1
100 CAPSULE ORAL 3 TIMES DAILY PRN
Qty: 30 CAPSULE | Refills: 0 | Status: SHIPPED | OUTPATIENT
Start: 2024-07-17 | End: 2024-07-27

## 2024-07-17 ASSESSMENT — FIBROSIS 4 INDEX: FIB4 SCORE: 2.01

## 2024-08-10 DIAGNOSIS — E78.00 PURE HYPERCHOLESTEROLEMIA: ICD-10-CM

## 2024-08-13 RX ORDER — ATORVASTATIN CALCIUM 10 MG/1
10 TABLET, FILM COATED ORAL EVERY EVENING
Qty: 90 TABLET | Refills: 3 | Status: SHIPPED | OUTPATIENT
Start: 2024-08-13

## 2024-08-22 DIAGNOSIS — F05 SUNDOWN SYNDROME: ICD-10-CM

## 2024-08-23 RX ORDER — QUETIAPINE FUMARATE 25 MG/1
TABLET, FILM COATED ORAL
Qty: 90 TABLET | Refills: 1 | Status: SHIPPED | OUTPATIENT
Start: 2024-08-23

## 2024-09-03 ENCOUNTER — APPOINTMENT (OUTPATIENT)
Dept: MEDICAL GROUP | Facility: MEDICAL CENTER | Age: 79
End: 2024-09-03
Payer: MEDICARE

## 2024-09-03 ENCOUNTER — HOSPITAL ENCOUNTER (OUTPATIENT)
Dept: LAB | Facility: MEDICAL CENTER | Age: 79
End: 2024-09-03
Attending: FAMILY MEDICINE
Payer: MEDICARE

## 2024-09-03 DIAGNOSIS — R73.03 PREDIABETES: ICD-10-CM

## 2024-09-03 DIAGNOSIS — R63.1 POLYDIPSIA: ICD-10-CM

## 2024-09-03 DIAGNOSIS — Z13.1 ENCOUNTER FOR SCREENING FOR DIABETES MELLITUS: ICD-10-CM

## 2024-09-03 DIAGNOSIS — R35.89 POLYURIA: ICD-10-CM

## 2024-09-03 LAB
EST. AVERAGE GLUCOSE BLD GHB EST-MCNC: 140 MG/DL
HBA1C MFR BLD: 6.5 % (ref 4–5.6)

## 2024-09-03 PROCEDURE — 83036 HEMOGLOBIN GLYCOSYLATED A1C: CPT | Mod: GA

## 2024-09-03 PROCEDURE — 36415 COLL VENOUS BLD VENIPUNCTURE: CPT | Mod: GA

## 2024-09-06 ENCOUNTER — APPOINTMENT (OUTPATIENT)
Dept: MEDICAL GROUP | Facility: MEDICAL CENTER | Age: 79
End: 2024-09-06
Payer: MEDICARE

## 2024-09-06 VITALS
HEIGHT: 63 IN | DIASTOLIC BLOOD PRESSURE: 68 MMHG | HEART RATE: 68 BPM | OXYGEN SATURATION: 97 % | WEIGHT: 107 LBS | SYSTOLIC BLOOD PRESSURE: 128 MMHG | TEMPERATURE: 97.7 F | BODY MASS INDEX: 18.96 KG/M2

## 2024-09-06 DIAGNOSIS — E78.00 PURE HYPERCHOLESTEROLEMIA: ICD-10-CM

## 2024-09-06 DIAGNOSIS — K21.9 GASTROESOPHAGEAL REFLUX DISEASE WITHOUT ESOPHAGITIS: ICD-10-CM

## 2024-09-06 DIAGNOSIS — E11.9 TYPE 2 DIABETES MELLITUS WITHOUT COMPLICATION, WITH LONG-TERM CURRENT USE OF INSULIN (HCC): ICD-10-CM

## 2024-09-06 DIAGNOSIS — I10 ESSENTIAL HYPERTENSION: ICD-10-CM

## 2024-09-06 DIAGNOSIS — F41.1 GAD (GENERALIZED ANXIETY DISORDER): ICD-10-CM

## 2024-09-06 DIAGNOSIS — Z79.4 TYPE 2 DIABETES MELLITUS WITHOUT COMPLICATION, WITH LONG-TERM CURRENT USE OF INSULIN (HCC): ICD-10-CM

## 2024-09-06 DIAGNOSIS — F03.918 DEMENTIA WITH BEHAVIORAL DISTURBANCE (HCC): ICD-10-CM

## 2024-09-06 DIAGNOSIS — K31.1 PYLORIC STENOSIS IN ADULT: ICD-10-CM

## 2024-09-06 PROCEDURE — 99215 OFFICE O/P EST HI 40 MIN: CPT | Performed by: FAMILY MEDICINE

## 2024-09-06 PROCEDURE — 3074F SYST BP LT 130 MM HG: CPT | Performed by: FAMILY MEDICINE

## 2024-09-06 PROCEDURE — 3078F DIAST BP <80 MM HG: CPT | Performed by: FAMILY MEDICINE

## 2024-09-06 RX ORDER — PANTOPRAZOLE SODIUM 20 MG/1
20 TABLET, DELAYED RELEASE ORAL DAILY
Qty: 90 TABLET | Refills: 3 | Status: SHIPPED | OUTPATIENT
Start: 2024-09-06

## 2024-09-06 ASSESSMENT — FIBROSIS 4 INDEX: FIB4 SCORE: 2.01

## 2024-09-08 PROBLEM — M67.442 GANGLION CYST OF BOTH HANDS: Status: RESOLVED | Noted: 2021-02-01 | Resolved: 2024-09-08

## 2024-09-08 PROBLEM — M67.441 GANGLION CYST OF BOTH HANDS: Status: RESOLVED | Noted: 2021-02-01 | Resolved: 2024-09-08

## 2024-09-08 NOTE — PROGRESS NOTES
Verbal consent was acquired by the patient to use [employer] ambient listening note generation during this visit: YES    CC: mental health follow up     Assessment & Plan:     1. Type 2 diabetes mellitus without complication, with long-term current use of insulin (HCC)  This is a new diagnosis. Recent A1C was 6.5.   Patient has dementia with behavioral disturbance. It is difficult for her  to convince her to take medications more than once time per day. Therefore, will prescribe Januvia.   - SITagliptin (JANUVIA) 25 MG Tab; Take 1 Tablet by mouth every day.  Dispense: 90 Tablet; Refill: 1  - CBC WITH DIFFERENTIAL; Future  - Comp Metabolic Panel; Future  - MICROALBUMIN CREAT RATIO URINE; Future  - HEMOGLOBIN A1C; Future  - follow up in 3 months.     2. Essential hypertension  Chronic, controlled with Losartan 50 mg qd, no s/e reported, will continue.      3. Pure hypercholesterolemia  Chronic, controlled with Lipitor 10 mg qd, no s/e reported, will continue.    - Lipid Profile; Future    4. YASH (generalized anxiety disorder)  5. Dementia with behavioral disturbance (HCC)  Chronic, controlled with Lexapro 10 mg qd. She also has dementia with behavioral disturbance. Seroquel 25 mg was prescribed for sundowning, which appears to be effective. Patient appears to have sundowning symptoms around midday. She refuses to follow up with any specialist for mental health.   - continue Seroquel 25 mg qd  - continue Lexapro 10 mg qd  - follow up in 3 months.     6. Gastroesophageal reflux disease without esophagitis  7. Pyloric stenosis s/p dilation   - pantoprazole (PROTONIX) 20 MG tablet; Take 1 Tablet by mouth every day.  Dispense: 90 Tablet; Refill: 3       Subjective:       HPI:   History of Present Illness    Patient has chronic dementia with behavioral disturbance and sundowning. She appears to do better with Lexapro and Seroquel according to her . However, she continues to have sundowning symptoms around  "midday. However, since starting medications, she appears to have less severe behavioral disturbance. Her anxiety also appears to improve.     She continues to take all medications as directed.     Recent labs showed new onset type 2 diabetes.         Current Outpatient Medications:     SITagliptin (JANUVIA) 25 MG Tab, Take 1 Tablet by mouth every day., Disp: 90 Tablet, Rfl: 1    pantoprazole (PROTONIX) 20 MG tablet, Take 1 Tablet by mouth every day., Disp: 90 Tablet, Rfl: 3    QUEtiapine (SEROQUEL) 25 MG Tab, TAKE 1 TABLET BY MOUTH 30 MINUTES AFTER DINNER, Disp: 90 Tablet, Rfl: 1    atorvastatin (LIPITOR) 10 MG Tab, TAKE 1 TABLET BY MOUTH EVERY EVENING, Disp: 90 Tablet, Rfl: 3    escitalopram (LEXAPRO) 10 MG Tab, Take 1 Tablet by mouth every day., Disp: 90 Tablet, Rfl: 3    losartan (COZAAR) 50 MG Tab, Take 1 Tablet by mouth every day., Disp: 90 Tablet, Rfl: 3    levalbuterol (XOPENEX HFA) 45 MCG/ACT inhaler, Inhale 1-2 Puffs every four hours as needed for Shortness of Breath., Disp: 45 g, Rfl: 2     Objective:     Exam:  /68 (BP Location: Left arm, Patient Position: Sitting, BP Cuff Size: Small adult)   Pulse 68   Temp 36.5 °C (97.7 °F) (Temporal)   Ht 1.6 m (5' 3\")   Wt 48.5 kg (107 lb)   LMP  (LMP Unknown)   SpO2 97%   BMI 18.95 kg/m²  Body mass index is 18.95 kg/m².    Constitutional: awake, alert, in no distress.  Skin: Warm, dry, good turgor, no rashes, bruises, ulcers in visible areas.  Eye: conjunctiva clear, lids neg for edema or lesions.  Neck: Trachea midline, no masses, no thyromegaly. No cervical or supraclavicular lymphadenopathy  Respiratory: Unlabored respiratory effort, lungs clear to auscultation, no wheezes, no rales.  Cardiovascular: Normal S1, S2, no murmur, no pedal edema.  Psych: Oriented x3, affect and mood wnl, intact judgement and insight.     Monofilament testing with a 10 gram force: sensation intact: intact bilaterally  Visual Inspection: Feet without maceration, ulcers, " fissures.  Pedal pulses: intact bilaterally         Return in about 3 months (around 12/6/2024) for Diabetes.      Total time spent reviewing pt's chart, labs, notes, imaging, and counseling/prescribing medications to patient before, during, and after the visit: 40 minutes.      Please note that this dictation was created using voice recognition software. I have made every reasonable attempt to correct obvious errors, but I expect that there are errors of grammar and possibly content that I did not discover before finalizing the note.

## 2024-10-24 DIAGNOSIS — E11.9 TYPE 2 DIABETES MELLITUS WITHOUT COMPLICATION, WITH LONG-TERM CURRENT USE OF INSULIN (HCC): ICD-10-CM

## 2024-10-24 DIAGNOSIS — Z79.4 TYPE 2 DIABETES MELLITUS WITHOUT COMPLICATION, WITH LONG-TERM CURRENT USE OF INSULIN (HCC): ICD-10-CM

## 2025-02-20 DIAGNOSIS — F05 SUNDOWN SYNDROME: ICD-10-CM

## 2025-02-21 RX ORDER — QUETIAPINE FUMARATE 25 MG/1
TABLET, FILM COATED ORAL
Qty: 90 TABLET | Refills: 1 | Status: SHIPPED | OUTPATIENT
Start: 2025-02-21

## 2025-03-29 DIAGNOSIS — E11.9 TYPE 2 DIABETES MELLITUS WITHOUT COMPLICATION, WITH LONG-TERM CURRENT USE OF INSULIN (HCC): ICD-10-CM

## 2025-03-29 DIAGNOSIS — Z79.4 TYPE 2 DIABETES MELLITUS WITHOUT COMPLICATION, WITH LONG-TERM CURRENT USE OF INSULIN (HCC): ICD-10-CM

## 2025-03-31 RX ORDER — SITAGLIPTIN 25 MG/1
25 TABLET, FILM COATED ORAL DAILY
Qty: 90 TABLET | Refills: 0 | Status: SHIPPED | OUTPATIENT
Start: 2025-03-31

## 2025-05-19 DIAGNOSIS — I10 ESSENTIAL HYPERTENSION: ICD-10-CM

## 2025-05-19 DIAGNOSIS — F41.1 GAD (GENERALIZED ANXIETY DISORDER): ICD-10-CM

## 2025-05-19 DIAGNOSIS — K21.9 GASTROESOPHAGEAL REFLUX DISEASE WITHOUT ESOPHAGITIS: ICD-10-CM

## 2025-05-20 NOTE — TELEPHONE ENCOUNTER
Was the patient seen in the last year in this department? Yes   Does patient have an active prescription for medications requested? No   Received Request Via: Pharmacy       normal...

## 2025-05-21 RX ORDER — ESCITALOPRAM OXALATE 10 MG/1
10 TABLET ORAL DAILY
Qty: 90 TABLET | Refills: 0 | Status: SHIPPED | OUTPATIENT
Start: 2025-05-21

## 2025-05-21 RX ORDER — PANTOPRAZOLE SODIUM 20 MG/1
20 TABLET, DELAYED RELEASE ORAL DAILY
Qty: 90 TABLET | Refills: 0 | Status: SHIPPED | OUTPATIENT
Start: 2025-05-21

## 2025-05-21 RX ORDER — LOSARTAN POTASSIUM 50 MG/1
50 TABLET ORAL DAILY
Qty: 90 TABLET | Refills: 0 | Status: SHIPPED | OUTPATIENT
Start: 2025-05-21

## 2025-06-02 ENCOUNTER — HOSPITAL ENCOUNTER (OUTPATIENT)
Dept: LAB | Facility: MEDICAL CENTER | Age: 80
End: 2025-06-02
Attending: FAMILY MEDICINE
Payer: MEDICARE

## 2025-06-02 DIAGNOSIS — E11.9 TYPE 2 DIABETES MELLITUS WITHOUT COMPLICATION, WITH LONG-TERM CURRENT USE OF INSULIN (HCC): ICD-10-CM

## 2025-06-02 DIAGNOSIS — Z79.4 TYPE 2 DIABETES MELLITUS WITHOUT COMPLICATION, WITH LONG-TERM CURRENT USE OF INSULIN (HCC): ICD-10-CM

## 2025-06-02 DIAGNOSIS — E78.00 PURE HYPERCHOLESTEROLEMIA: ICD-10-CM

## 2025-06-02 LAB
ALBUMIN SERPL BCP-MCNC: 4.1 G/DL (ref 3.2–4.9)
ALBUMIN/GLOB SERPL: 1.3 G/DL
ALP SERPL-CCNC: 65 U/L (ref 30–99)
ALT SERPL-CCNC: 25 U/L (ref 2–50)
ANION GAP SERPL CALC-SCNC: 11 MMOL/L (ref 7–16)
AST SERPL-CCNC: 26 U/L (ref 12–45)
BASOPHILS # BLD AUTO: 1.7 % (ref 0–1.8)
BASOPHILS # BLD: 0.08 K/UL (ref 0–0.12)
BILIRUB SERPL-MCNC: 0.4 MG/DL (ref 0.1–1.5)
BUN SERPL-MCNC: 16 MG/DL (ref 8–22)
CALCIUM ALBUM COR SERPL-MCNC: 9.1 MG/DL (ref 8.5–10.5)
CALCIUM SERPL-MCNC: 9.2 MG/DL (ref 8.5–10.5)
CHLORIDE SERPL-SCNC: 106 MMOL/L (ref 96–112)
CHOLEST SERPL-MCNC: 183 MG/DL (ref 100–199)
CO2 SERPL-SCNC: 21 MMOL/L (ref 20–33)
CREAT SERPL-MCNC: 0.77 MG/DL (ref 0.5–1.4)
EOSINOPHIL # BLD AUTO: 0.12 K/UL (ref 0–0.51)
EOSINOPHIL NFR BLD: 2.6 % (ref 0–6.9)
ERYTHROCYTE [DISTWIDTH] IN BLOOD BY AUTOMATED COUNT: 39.7 FL (ref 35.9–50)
EST. AVERAGE GLUCOSE BLD GHB EST-MCNC: 131 MG/DL
FASTING STATUS PATIENT QL REPORTED: NORMAL
GFR SERPLBLD CREATININE-BSD FMLA CKD-EPI: 78 ML/MIN/1.73 M 2
GLOBULIN SER CALC-MCNC: 3.1 G/DL (ref 1.9–3.5)
GLUCOSE SERPL-MCNC: 108 MG/DL (ref 65–99)
HBA1C MFR BLD: 6.2 % (ref 4–5.6)
HCT VFR BLD AUTO: 44.6 % (ref 37–47)
HDLC SERPL-MCNC: 57 MG/DL
HGB BLD-MCNC: 15 G/DL (ref 12–16)
LDLC SERPL CALC-MCNC: 100 MG/DL
LYMPHOCYTES # BLD AUTO: 1.35 K/UL (ref 1–4.8)
LYMPHOCYTES NFR BLD: 28.7 % (ref 22–41)
MANUAL DIFF BLD: NORMAL
MCH RBC QN AUTO: 31.3 PG (ref 27–33)
MCHC RBC AUTO-ENTMCNC: 33.6 G/DL (ref 32.2–35.5)
MCV RBC AUTO: 93.1 FL (ref 81.4–97.8)
MONOCYTES # BLD AUTO: 0.4 K/UL (ref 0–0.85)
MONOCYTES NFR BLD AUTO: 8.7 % (ref 0–13.4)
MORPHOLOGY BLD-IMP: NORMAL
NEUTROPHILS # BLD AUTO: 2.74 K/UL (ref 1.82–7.42)
NEUTROPHILS NFR BLD: 58.3 % (ref 44–72)
NRBC # BLD AUTO: 0 K/UL
NRBC BLD-RTO: 0 /100 WBC (ref 0–0.2)
PLATELET # BLD AUTO: 240 K/UL (ref 164–446)
PLATELET BLD QL SMEAR: NORMAL
PMV BLD AUTO: 9.3 FL (ref 9–12.9)
POTASSIUM SERPL-SCNC: 4.5 MMOL/L (ref 3.6–5.5)
PROT SERPL-MCNC: 7.2 G/DL (ref 6–8.2)
RBC # BLD AUTO: 4.79 M/UL (ref 4.2–5.4)
RBC BLD AUTO: PRESENT
SODIUM SERPL-SCNC: 138 MMOL/L (ref 135–145)
TRIGL SERPL-MCNC: 129 MG/DL (ref 0–149)
VARIANT LYMPHS BLD QL SMEAR: NORMAL
WBC # BLD AUTO: 4.7 K/UL (ref 4.8–10.8)

## 2025-06-02 PROCEDURE — 85007 BL SMEAR W/DIFF WBC COUNT: CPT

## 2025-06-02 PROCEDURE — 83036 HEMOGLOBIN GLYCOSYLATED A1C: CPT | Mod: GA

## 2025-06-02 PROCEDURE — 80053 COMPREHEN METABOLIC PANEL: CPT

## 2025-06-02 PROCEDURE — 36415 COLL VENOUS BLD VENIPUNCTURE: CPT

## 2025-06-02 PROCEDURE — 85027 COMPLETE CBC AUTOMATED: CPT

## 2025-06-02 PROCEDURE — 80061 LIPID PANEL: CPT

## 2025-06-03 ENCOUNTER — RESULTS FOLLOW-UP (OUTPATIENT)
Dept: MEDICAL GROUP | Facility: MEDICAL CENTER | Age: 80
End: 2025-06-03
Payer: MEDICARE

## 2025-06-03 ENCOUNTER — HOSPITAL ENCOUNTER (OUTPATIENT)
Facility: MEDICAL CENTER | Age: 80
End: 2025-06-03
Attending: FAMILY MEDICINE
Payer: MEDICARE

## 2025-06-03 DIAGNOSIS — Z12.31 ENCOUNTER FOR SCREENING MAMMOGRAM FOR BREAST CANCER: Primary | ICD-10-CM

## 2025-06-03 LAB
CREAT UR-MCNC: 142 MG/DL
MICROALBUMIN UR-MCNC: 1.8 MG/DL
MICROALBUMIN/CREAT UR: 13 MG/G (ref 0–30)

## 2025-06-03 PROCEDURE — 82570 ASSAY OF URINE CREATININE: CPT

## 2025-06-03 PROCEDURE — 82043 UR ALBUMIN QUANTITATIVE: CPT

## 2025-06-04 ENCOUNTER — RESULTS FOLLOW-UP (OUTPATIENT)
Dept: MEDICAL GROUP | Facility: MEDICAL CENTER | Age: 80
End: 2025-06-04
Payer: MEDICARE

## 2025-06-05 ENCOUNTER — APPOINTMENT (OUTPATIENT)
Dept: MEDICAL GROUP | Facility: MEDICAL CENTER | Age: 80
End: 2025-06-05
Payer: MEDICARE

## 2025-06-26 DIAGNOSIS — Z79.4 TYPE 2 DIABETES MELLITUS WITHOUT COMPLICATION, WITH LONG-TERM CURRENT USE OF INSULIN (HCC): ICD-10-CM

## 2025-06-26 DIAGNOSIS — E11.9 TYPE 2 DIABETES MELLITUS WITHOUT COMPLICATION, WITH LONG-TERM CURRENT USE OF INSULIN (HCC): ICD-10-CM

## 2025-06-27 RX ORDER — SITAGLIPTIN 25 MG/1
25 TABLET, FILM COATED ORAL DAILY
Qty: 90 TABLET | Refills: 0 | Status: SHIPPED | OUTPATIENT
Start: 2025-06-27

## 2025-07-14 DIAGNOSIS — J45.30 MILD PERSISTENT ASTHMA WITHOUT COMPLICATION: ICD-10-CM

## 2025-07-17 RX ORDER — LEVALBUTEROL TARTRATE 45 UG/1
1-2 AEROSOL, METERED ORAL EVERY 4 HOURS PRN
Qty: 45 G | Refills: 2 | Status: SHIPPED | OUTPATIENT
Start: 2025-07-17

## 2025-07-17 RX ORDER — BECLOMETHASONE DIPROPIONATE HFA 40 UG/1
1 AEROSOL, METERED RESPIRATORY (INHALATION) 2 TIMES DAILY
Qty: 10.6 G | Refills: 5 | Status: SHIPPED | OUTPATIENT
Start: 2025-07-17 | End: 2025-07-24 | Stop reason: SDUPTHER

## 2025-07-24 ENCOUNTER — OFFICE VISIT (OUTPATIENT)
Dept: MEDICAL GROUP | Facility: MEDICAL CENTER | Age: 80
End: 2025-07-24
Payer: MEDICARE

## 2025-07-24 VITALS
HEIGHT: 63 IN | WEIGHT: 111.99 LBS | TEMPERATURE: 97.3 F | SYSTOLIC BLOOD PRESSURE: 122 MMHG | HEART RATE: 86 BPM | BODY MASS INDEX: 19.84 KG/M2 | DIASTOLIC BLOOD PRESSURE: 62 MMHG | OXYGEN SATURATION: 95 %

## 2025-07-24 DIAGNOSIS — F03.918 DEMENTIA WITH BEHAVIORAL DISTURBANCE (HCC): ICD-10-CM

## 2025-07-24 DIAGNOSIS — K21.9 GASTROESOPHAGEAL REFLUX DISEASE WITHOUT ESOPHAGITIS: ICD-10-CM

## 2025-07-24 DIAGNOSIS — Z00.00 ENCOUNTER FOR SUBSEQUENT ANNUAL WELLNESS VISIT (AWV) IN MEDICARE PATIENT: Primary | ICD-10-CM

## 2025-07-24 DIAGNOSIS — J30.9 ALLERGIC RHINITIS, UNSPECIFIED SEASONALITY, UNSPECIFIED TRIGGER: ICD-10-CM

## 2025-07-24 DIAGNOSIS — T88.7XXA MEDICATION SIDE EFFECTS: ICD-10-CM

## 2025-07-24 DIAGNOSIS — F41.1 GAD (GENERALIZED ANXIETY DISORDER): ICD-10-CM

## 2025-07-24 DIAGNOSIS — I10 ESSENTIAL HYPERTENSION: ICD-10-CM

## 2025-07-24 DIAGNOSIS — E11.9 TYPE 2 DIABETES MELLITUS WITHOUT COMPLICATION, WITH LONG-TERM CURRENT USE OF INSULIN (HCC): ICD-10-CM

## 2025-07-24 DIAGNOSIS — M85.89 OSTEOPENIA OF MULTIPLE SITES: ICD-10-CM

## 2025-07-24 DIAGNOSIS — E78.00 PURE HYPERCHOLESTEROLEMIA: ICD-10-CM

## 2025-07-24 DIAGNOSIS — N81.4 CYSTOCELE WITH UTERINE PROLAPSE: ICD-10-CM

## 2025-07-24 DIAGNOSIS — Z79.4 TYPE 2 DIABETES MELLITUS WITHOUT COMPLICATION, WITH LONG-TERM CURRENT USE OF INSULIN (HCC): ICD-10-CM

## 2025-07-24 DIAGNOSIS — F05 SUNDOWN SYNDROME: ICD-10-CM

## 2025-07-24 DIAGNOSIS — K44.9 HIATAL HERNIA: ICD-10-CM

## 2025-07-24 DIAGNOSIS — J45.30 MILD PERSISTENT ASTHMA WITHOUT COMPLICATION: ICD-10-CM

## 2025-07-24 RX ORDER — ESCITALOPRAM OXALATE 10 MG/1
10 TABLET ORAL DAILY
Qty: 90 TABLET | Refills: 3 | Status: SHIPPED | OUTPATIENT
Start: 2025-07-24

## 2025-07-24 RX ORDER — QUETIAPINE FUMARATE 25 MG/1
12.5 TABLET, FILM COATED ORAL NIGHTLY
Qty: 90 TABLET | Refills: 1 | Status: SHIPPED | OUTPATIENT
Start: 2025-07-24

## 2025-07-24 RX ORDER — QUETIAPINE FUMARATE 25 MG/1
25 TABLET, FILM COATED ORAL NIGHTLY
Qty: 90 TABLET | Refills: 3 | Status: SHIPPED | OUTPATIENT
Start: 2025-07-24 | End: 2025-07-24 | Stop reason: SDUPTHER

## 2025-07-24 RX ORDER — ATORVASTATIN CALCIUM 20 MG/1
20 TABLET, FILM COATED ORAL EVERY EVENING
Qty: 90 TABLET | Refills: 3 | Status: SHIPPED | OUTPATIENT
Start: 2025-07-24

## 2025-07-24 RX ORDER — FLUTICASONE PROPIONATE 50 MCG
2 SPRAY, SUSPENSION (ML) NASAL DAILY
Qty: 16 G | Refills: 5 | Status: SHIPPED | OUTPATIENT
Start: 2025-07-24

## 2025-07-24 RX ORDER — CETIRIZINE HYDROCHLORIDE 10 MG/1
10 TABLET ORAL DAILY
Qty: 90 TABLET | Refills: 3 | Status: SHIPPED | OUTPATIENT
Start: 2025-07-24

## 2025-07-24 RX ORDER — PANTOPRAZOLE SODIUM 20 MG/1
20 TABLET, DELAYED RELEASE ORAL DAILY
Qty: 90 TABLET | Refills: 0 | Status: SHIPPED | OUTPATIENT
Start: 2025-07-24

## 2025-07-24 RX ORDER — BECLOMETHASONE DIPROPIONATE HFA 40 UG/1
1 AEROSOL, METERED RESPIRATORY (INHALATION) 2 TIMES DAILY
Qty: 10.6 G | Refills: 11 | Status: SHIPPED | OUTPATIENT
Start: 2025-07-24

## 2025-07-24 RX ORDER — LOSARTAN POTASSIUM 50 MG/1
50 TABLET ORAL DAILY
Qty: 90 TABLET | Refills: 0 | Status: SHIPPED | OUTPATIENT
Start: 2025-07-24

## 2025-07-24 ASSESSMENT — FIBROSIS 4 INDEX: FIB4 SCORE: 1.733333333333333333

## 2025-07-24 NOTE — PROGRESS NOTES
Chief Complaint   Patient presents with    Annual Exam, dementia, medication side effect       HPI:  Ila Bangura is a 80 y.o. here for Medicare Annual Wellness Visit     Patient is taking all her medication as directed, no side effect reported.    Patient was previously diagnosed with dementia with behavioral disturbance.  She was started on Seroquel 25 mg nightly.  Behavioral disturbance and sundowning did improve significantly with Seroquel.  However, patient appeared to be too sleepy during the day.    She continues to take the rest of her medication as directed.  No side effect reported.    Patient Active Problem List    Diagnosis Date Noted    Dementia with behavioral disturbance (HCC) 09/06/2024    FHx: breast cancer 07/18/2023    YASH (generalized anxiety disorder) 06/16/2023    Hiatal hernia 07/17/2022    FHx: diabetes mellitus 07/17/2022    Pyloric stenosis in adult s/p balloon dilation 06/27/2022    Osteopenia of multiple sites 04/18/2022    FHx: colon cancer 01/17/2020    Cystocele with uterine prolapse_OBGYN 12/11/2019    Prediabetes 10/10/2016    Essential hypertension 10/09/2016    Pure hypercholesterolemia 10/09/2016    Mild persistent asthma without complication 10/09/2016    Gastroesophageal reflux disease without esophagitis 10/09/2016       Current Medications[1]       Current supplements as per medication list.     Allergies: Augmentin and Amoxicillin-pot clavulanate    Current social contact/activities: patient has dementia, unable to respond    She  reports that she has never smoked. She has never used smokeless tobacco. She reports that she does not drink alcohol and does not use drugs.  Counseling given: Not Answered      ROS:    Gait: Uses no assistive device  Ostomy: No  Other tubes: No  Amputations: No  Chronic oxygen use: No  Last eye exam: 2023  Wears hearing aids: No   : Denies any urinary leakage during the last 6 months    Screening:    Depression Screening  Little interest  or pleasure in doing things?     Feeling down, depressed , or hopeless?    Trouble falling or staying asleep, or sleeping too much?     Feeling tired or having little energy?     Poor appetite or overeating?     Feeling bad about yourself - or that you are a failure or have let yourself or your family down?    Trouble concentrating on things, such as reading the newspaper or watching television?    Moving or speaking so slowly that other people could have noticed.  Or the opposite - being so fidgety or restless that you have been moving around a lot more than usual?     Thoughts that you would be better off dead, or of hurting yourself?     Patient Health Questionnaire Score:      If depressive symptoms identified deferred to follow up visit unless specifically addressed in assessment and plan.    Interpretation of PHQ-9 Total Score   Score Severity   1-4 No Depression   5-9 Mild Depression   10-14 Moderate Depression   15-19 Moderately Severe Depression   20-27 Severe Depression    Screening for Cognitive Impairment  Do you or any of your friends or family members have any concern about your memory?    Three Minute Recall (Village, Kitchen, Baby)  /3 Patient has dementia, unable to answer  Sylvain clock face with all 12 numbers and set the hands to show 10 minutes past 11.    Patient has dementia, unable to answer  Cognitive concerns identified deferred for follow up unless specifically addressed in assessment and plan.    Fall Risk Assessment  Has the patient had two or more falls in the last year or any fall with injury in the last year?       Safety Assessment  Do you always wear your seatbelt?     Any changes to home needed to function safely?    Difficulty hearing.     Patient counseled about all safety risks that were identified.    Functional Assessment ADLs  Are there any barriers preventing you from cooking for yourself or meeting nutritional needs?   . Patient has dementia, unable to answer  Are there any  barriers preventing you from driving safely or obtaining transportation?   . Patient has dementia, unable to answer  Are there any barriers preventing you from using a telephone or calling for help?    Patient has dementia, unable to answer  Are there any barriers preventing you from shopping?   . Patient has dementia, unable to answer  Are there any barriers preventing you from taking care of your own finances?    Patient has dementia, unable to answer  Are there any barriers preventing you from managing your medications?    Patient has dementia, unable to answer  Are there any barriers preventing you from showering, bathing or dressing yourself?   Patient has dementia, unable to answer  Are there any barriers preventing you from doing housework or laundry?   Patient has dementia, unable to answer  Are there any barriers preventing you from using the toilet?  Patient has dementia, unable to answer  Are you currently engaging in any exercise or physical activity?   . Patient has dementia, unable to answer    Self-Assessment of Health  What is your perception of your health?   Patient has dementia, unable to respond  Do you sleep more than six hours a night?   Patient has dementia, unable to respond  In the past 7 days, how much did pain keep you from doing your normal work?   Patient has dementia, unable to respond  Do you spend quality time with family or friends (virtually or in person)?   Patient has dementia, unable to respond  Do you usually eat a heart healthy diet that constists of a variety of fruits, vegetables, whole grains and fiber?   Patient has dementia, unable to respond  Do you eat foods high in fat and/or Fast Food more than three times per week?   Patient has dementia, unable to respond  How concerned are you that your medical conditions are not being well managed?   Patient has dementia, unable to respond  Are you worried that in the next 2 months, you may not have stable housing that you own,  rent, or stay in as part of a household?   Patient has dementia, unable to respond      Advance Care Planning  Do you have an Advance Directive, Living Will, Durable Power of , or POLST?                   Health Maintenance Summary            Current Care Gaps       Annual Wellness Visit (Yearly) Overdue since 3/11/2023      03/11/2022  Visit Dx: Medicare annual wellness visit, subsequent    03/11/2022  Level of Service: ID ANNUAL WELLNESS VISIT-INCLUDES PPPS SUBSEQUE*    02/01/2021  Visit Dx: Medicare annual wellness visit, subsequent    02/01/2021  Subsequent Annual Wellness Visit - Includes PPPS ()    07/13/2015       Only the first 5 history entries have been loaded, but more history exists.            COVID-19 Vaccine (4 - 2024-25 season) Overdue since 9/1/2024 08/30/2023  Imm Admin: MODERNA BIVALENT BOOSTER SARS-COV-2 VACCINE (6+)    01/24/2023  Imm Admin: MODERNA BIVALENT BOOSTER SARS-COV-2 VACCINE (6+)    12/06/2021  Imm Admin: MODERNA SARS-COV-2 VACCINE (12+)    03/13/2021  Imm Admin: MODERNA SARS-COV-2 VACCINE (12+)    02/13/2021  Imm Admin: MODERNA SARS-COV-2 VACCINE (12+)      Only the first 5 history entries have been loaded, but more history exists.                      Upcoming       Mammogram (Yearly) Postponed until 7/24/2026 06/03/2025  Order placed for MA-SCREENING MAMMO BILAT W/TOMOSYNTHESIS W/CAD by Erin Treviño M.D.    01/15/2024  MA-SCREENING MAMMO BILAT W/TOMOSYNTHESIS W/CAD    12/23/2022  MA-SCREENING MAMMO BILAT W/TOMOSYNTHESIS W/CAD    12/21/2021  MA-SCREENING MAMMO BILAT W/TOMOSYNTHESIS W/CAD    12/17/2020  MA-SCREENING MAMMO BILAT W/TOMOSYNTHESIS W/CAD      Only the first 5 history entries have been loaded, but more history exists.              Influenza Vaccine (1) Next due on 9/1/2025 08/30/2023  Imm Admin: Influenza Seasonal Injectable - Historical Data    01/12/2023  Imm Admin: Influenza Vaccine Adult HD    10/26/2021  Imm Admin: Influenza Vaccine Adult HD     10/05/2020  Imm Admin: Influenza Vaccine Adult HD    12/11/2019  Imm Admin: Influenza Vaccine Adult HD      Only the first 5 history entries have been loaded, but more history exists.              Bone Density Scan (Every 5 Years) Next due on 4/18/2027 04/18/2022  DS-BONE DENSITY STUDY (DEXA)    05/03/2017  DS-BONE DENSITY STUDY (DEXA)    02/27/2012  Outside Procedure: DS-BONE DENSITY STUDY (DEXA)              IMM DTaP/Tdap/Td Vaccine (3 - Td or Tdap) Next due on 7/20/2029 07/20/2019  Imm Admin: Tdap Vaccine    06/24/2013  Imm Admin: Tdap Vaccine                      Completed or No Longer Recommended       Hepatitis B Vaccine (Hep B) (Series Information) Completed      11/21/2023  Imm Admin: Hepatitis B Vaccine (Adol/Adult)    04/17/2023  Imm Admin: Hepatitis B Vaccine (Adol/Adult)    01/17/2020  Imm Admin: Hepatitis B Vaccine (Adol/Adult)              Zoster (Shingles) Vaccines (Series Information) Completed      12/18/2019  Imm Admin: Zoster Vaccine Recombinant (RZV) (SHINGRIX)    07/20/2019  Imm Admin: Zoster Vaccine Recombinant (RZV) (SHINGRIX)    10/25/2014  Imm Admin: Zoster Vaccine Live (ZVL) (Zostavax) - HISTORICAL DATA    10/15/2008  Imm Admin: Zoster Vaccine Live (ZVL) (Zostavax) - HISTORICAL DATA              Pneumococcal Vaccine: 50+ Years (Series Information) Completed      03/25/2016  Imm Admin: Pneumococcal Conjugate Vaccine (Prevnar/PCV-13)    07/13/2015  Imm Admin: Pneumococcal Conjugate Vaccine (Prevnar/PCV-13)    03/25/2015  Imm Admin: Pneumococcal polysaccharide vaccine (PPSV-23)    02/13/2012  Imm Admin: Pneumococcal polysaccharide vaccine (PPSV-23)              Hepatitis A Vaccine (Hep A) (Series Information) Aged Out      No completion history exists for this topic.              HPV Vaccines (Series Information) Aged Out     No completion history exists for this topic.              Polio Vaccine (Inactivated Polio) (Series Information) Aged Out     No completion history exists  for this topic.              Meningococcal Immunization (Series Information) Aged Out     No completion history exists for this topic.              Meningococcal B Vaccine (Series Information) Aged Out     No completion history exists for this topic.              Diabetes: Retinopathy Screening  Discontinued        Frequency changed to Never automatically (Topic No Longer Applies)    10/28/2023  AMB EXTERNAL RETINAL SCREENING RESULTS    10/31/2022  AMB EXTERNAL RETINAL SCREENING RESULTS    04/04/2022  REFERRAL FOR RETINAL SCREENING EXAM    07/19/2021  REFERRAL FOR RETINAL SCREENING EXAM     Only the first 5 history entries have been loaded, but more history exists.            A1c Screening  Discontinued        Frequency changed to Never automatically (Topic No Longer Applies)    07/24/2025  Order placed for HEMOGLOBIN A1C by Erin Treviño M.D.    06/02/2025  HEMOGLOBIN A1C    09/03/2024  HEMOGLOBIN A1C    02/22/2024  HEMOGLOBIN A1C      Only the first 5 history entries have been loaded, but more history exists.              Fasting Lipid Profile  Discontinued        Frequency changed to Never automatically (Topic No Longer Applies)    07/24/2025  Order placed for Lipid Profile by Erin Treviño M.D.    06/02/2025  Lipid Profile    02/22/2024  Lipid Profile    07/07/2023  Lipid Profile      Only the first 5 history entries have been loaded, but more history exists.              Diabetes: Urine Protein Screening  Discontinued        Frequency changed to Never automatically (Topic No Longer Applies)    06/03/2025  MICROALBUMIN CREAT RATIO URINE    02/22/2024  MICROALBUMIN CREAT RATIO URINE    07/07/2023  MICROALBUMIN CREAT RATIO URINE    02/01/2022  MICROALBUMIN CREAT RATIO URINE      Only the first 5 history entries have been loaded, but more history exists.              SERUM CREATININE  Discontinued        Frequency changed to Never automatically (Topic No Longer Applies)    07/24/2025  Order placed for Comp Metabolic  Panel by Erin Treviño M.D.    06/02/2025  Comp Metabolic Panel    03/08/2024  COMP METABOLIC PANEL    02/23/2024  COMP METABOLIC PANEL      Only the first 5 history entries have been loaded, but more history exists.              Colorectal Cancer Screening  Discontinued        Frequency changed to Never automatically (Topic No Longer Applies)    07/07/2022  OCCULT BLOOD FECES IMMUNOASSAY    05/13/2022  REFERRAL TO GI FOR COLONOSCOPY    10/13/2015  REFERRAL TO GI FOR COLONOSCOPY              Diabetes: Monofilament / LE Exam  Discontinued        Frequency changed to Never automatically (Topic No Longer Applies)    09/08/2024  Diabetic Monofilament LE Exam    09/06/2024  [employer]: WORKFLOW - DIABETES - DIABETIC FOOT EXAM PERFORMED    05/11/2021  Diabetic Monofilament Lower Extremity Exam    01/17/2020  Diabetic Monofilament Lower Extremity Exam      Only the first 5 history entries have been loaded, but more history exists.              Hepatitis C Screening  Discontinued        Frequency changed to Never automatically (Topic No Longer Applies)    12/02/2019  Hepatitis C Antibody component of HEP C VIRUS ANTIBODY                            Patient Care Team:  Erin Treviño M.D. as PCP - General (Family Medicine)  Mateus Garcia III, M.D. as Consulting Physician (Gastroenterology)  Kingston Grider M.D. (Allergy and Immunology)  Michael Hillman M.D. (Otolaryngology)  Liam Nascimento M.D. (Ophthalmology)      Social History[2]  Family History   Problem Relation Age of Onset    Other Mother         Intestinal Problem    Diabetes Mother     Cancer Father         Stomach Cancer    Lung Disease Father         Smoker    Cancer Sister         breast cancer/mastectomy    Diabetes Sister     Diabetes Brother     Cancer Maternal Grandmother         bone cancer    No Known Problems Maternal Grandfather     Diabetes Brother     No Known Problems Sister     No Known Problems Paternal Grandmother     No Known Problems Paternal  "Grandfather      She  has a past medical history of Asthma, Diabetes (HCC), and GERD (gastroesophageal reflux disease).   Past Surgical History[3]    Exam:   /62 (BP Location: Right arm, Patient Position: Sitting, BP Cuff Size: Small adult)   Pulse 86   Temp 36.3 °C (97.3 °F) (Temporal)   Ht 1.6 m (5' 3\")   Wt 50.8 kg (111 lb 15.9 oz)   SpO2 95%  Body mass index is 19.84 kg/m².    Hearing good.    Dentition good  Alert, oriented in no acute distress.  Eye contact is good, speech goal directed, affect calm    Assessment and Plan. The following treatment and monitoring plan is recommended:      1. Mild persistent asthma without complication  Chronic, controlled with Qvar and Xopenex as needed, no s/e reported, will continue.    - beclomethasone HFA (QVAR REDIHALER) 40 MCG/ACT inhaler; Inhale 1 Puff 2 times a day.  Dispense: 10.6 g; Refill: 11    2. Pure hypercholesterolemia  Chronic, controlled with Lipitor 20 mg daily, no s/e reported, will continue.    - atorvastatin (LIPITOR) 20 MG Tab; Take 1 Tablet by mouth every evening.  Dispense: 90 Tablet; Refill: 3  - Lipid Profile; Future    3. YASH (generalized anxiety disorder)  Chronic, controlled with Celexa 10 mg daily, no s/e reported, will continue.    - escitalopram (LEXAPRO) 10 MG Tab; Take 1 Tablet by mouth every day.  Dispense: 90 Tablet; Refill: 3    4. Type 2 diabetes mellitus without complication, with long-term current use of insulin (HCC)  Chronic, A1c improved from 6.5 to 6.2 with Januvia 25 mg daily.  Patient is tolerating Januvia well, no side effect reported, will continue.  - SITagliptin (JANUVIA) 25 MG Tab; Take 1 Tablet by mouth every day.  Dispense: 90 Tablet; Refill: 0  - HEMOGLOBIN A1C; Future  - Comp Metabolic Panel; Future  - dietary modification, exercise, weight loss  - Annual eye exam  - Regular foot exam  - Discussed prevention and management of hypoglycemia  - Side effects of all medications discussed with patient  - Follow up in 3 " months.     5. Essential hypertension  Chronic, controlled with losartan 50 mg daily, no s/e reported, will continue.    - losartan (COZAAR) 50 MG Tab; Take 1 Tablet by mouth every day.  Dispense: 90 Tablet; Refill: 0    6. Gastroesophageal reflux disease without esophagitis  8. Hiatal hernia  Chronic, controlled with Protonix 20 mg daily, no s/e reported, will continue.    - pantoprazole (PROTONIX) 20 MG tablet; Take 1 Tablet by mouth every day.  Dispense: 90 Tablet; Refill: 0    7. Allergic rhinitis, unspecified seasonality, unspecified trigger  Chronic, controlled with Zyrtec and Flonase, no s/e reported, will continue.    - cetirizine (ZYRTEC) 10 MG Tab; Take 1 Tablet by mouth every day.  Dispense: 90 Tablet; Refill: 3  - fluticasone (FLONASE) 50 MCG/ACT nasal spray; Administer 2 Sprays into affected nostril(S) every day.  Dispense: 16 g; Refill: 5    9. Cystocele with uterine prolapse_OBGYN  Chronic, asymptomatic, will continue to monitor.    10. Osteopenia of multiple sites  - Vitamin D: 2000 units per day, maintain serum vitamin D level > 30   - Calcium 600 mg BID  - Weight-bearing, balance, resistance exercises   - maintain healthy active lifestyle  - avoid or stop smoking  - Limit alcohol consumption to one drink per day  - pt was counseled on fall prevention    - home fall-proofing information provided.      11. Encounter for subsequent annual wellness visit (AWV) in Medicare patient (Primary)  Labs per orders  Immunization reviewed and discussed.  Patient was counseled about  diet, supplements, exercises.   Preventive cares reviewed, discussed, and updated as appropriate.       12. 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 Medication side effects  Chronic dementia with behavioral disturbance.  Patient is taking Seroquel 25 mg at night due to sundowning and severe behavioral disturbance.  Unfortunately, her  complains of daytime hypersomnolence with Seroquel.  Recommended  reducing Seroquel to 12.5 mg nightly.  Follow-up in 3 months for reassessment.  - QUEtiapine (SEROQUEL) 25 MG Tab; Take 0.5 Tablets by mouth every evening.  Dispense: 90 Tablet; Refill: 1  - URINALYSIS,CULTURE IF INDICATED; Standing  - recommended regularly exercise, medication compliance  - Involve in mentally stimulated activities such as new hobbies, reading, or solving crossword puzzles.  - Stay involved socially, attend community activities, [employer], support groups, adult day care, senior center.  - Caregiver counseling provided.    Services suggested: No services needed at this time  Health Care Screening: Age-appropriate preventive services recommended by USPTF and ACIP covered by Medicare were discussed today. Services ordered if indicated and agreed upon by the patient.  Referrals offered: Community-based lifestyle interventions to reduce health risks and promote self-management and wellness, fall prevention, nutrition, physical activity, tobacco-use cessation, weight loss, and mental health services as per orders if indicated.    Discussion today about general wellness and lifestyle habits:    Prevent falls and reduce trip hazards; Cautioned about securing or removing rugs.  Have a working fire alarm and carbon monoxide detector;   Engage in regular physical activity and social activities     Follow-up: Return in about 3 months (around 10/24/2025) for Multiple issues.    Billing : secondary to the complexity of this patient's illnesses and their interactions.  All problems listed were discussed during the visit, medications were reviewed, and complexities were discussed as well as plan for the future.         [1]   Current Outpatient Medications   Medication Sig Dispense Refill    beclomethasone HFA (QVAR REDIHALER) 40 MCG/ACT inhaler Inhale 1 Puff 2 times a day. 10.6 g 11    atorvastatin (LIPITOR) 20 MG Tab Take 1 Tablet by mouth every evening. 90 Tablet 3    escitalopram (LEXAPRO) 10 MG Tab Take 1  Tablet by mouth every day. 90 Tablet 3    SITagliptin (JANUVIA) 25 MG Tab Take 1 Tablet by mouth every day. 90 Tablet 0    losartan (COZAAR) 50 MG Tab Take 1 Tablet by mouth every day. 90 Tablet 0    pantoprazole (PROTONIX) 20 MG tablet Take 1 Tablet by mouth every day. 90 Tablet 0    QUEtiapine (SEROQUEL) 25 MG Tab Take 0.5 Tablets by mouth every evening. 90 Tablet 1    cetirizine (ZYRTEC) 10 MG Tab Take 1 Tablet by mouth every day. 90 Tablet 3    fluticasone (FLONASE) 50 MCG/ACT nasal spray Administer 2 Sprays into affected nostril(S) every day. 16 g 5    levalbuterol (XOPENEX HFA) 45 MCG/ACT inhaler Inhale 1-2 Puffs every four hours as needed for Shortness of Breath. 45 g 2     No current facility-administered medications for this visit.   [2]   Social History  Tobacco Use    Smoking status: Never    Smokeless tobacco: Never   Vaping Use    Vaping status: Never Used   Substance Use Topics    Alcohol use: No    Drug use: No   [3]   Past Surgical History:  Procedure Laterality Date    DENTAL SURGERY  2016    bone graft    COLONOSCOPY      negative    UPPER OR LOWER GI PROCEDURE WITH ANESTHESIA      x 2 upper GI

## 2025-07-26 PROBLEM — D18.03 HEPATIC HEMANGIOMA: Status: RESOLVED | Noted: 2022-07-01 | Resolved: 2025-07-26

## 2025-07-26 PROBLEM — H25.9 AGE-RELATED CATARACT OF BOTH EYES: Status: RESOLVED | Noted: 2021-10-26 | Resolved: 2025-07-26

## 2025-07-26 PROBLEM — K76.0 HEPATIC STEATOSIS: Status: RESOLVED | Noted: 2022-07-01 | Resolved: 2025-07-26

## 2025-07-26 ASSESSMENT — PATIENT HEALTH QUESTIONNAIRE - PHQ9: CLINICAL INTERPRETATION OF PHQ2 SCORE: 0

## 2025-08-11 ENCOUNTER — OFFICE VISIT (OUTPATIENT)
Dept: URGENT CARE | Facility: PHYSICIAN GROUP | Age: 80
End: 2025-08-11
Payer: MEDICARE

## 2025-08-11 ENCOUNTER — HOSPITAL ENCOUNTER (EMERGENCY)
Facility: MEDICAL CENTER | Age: 80
End: 2025-08-11
Payer: MEDICARE

## 2025-08-11 VITALS
WEIGHT: 109.35 LBS | SYSTOLIC BLOOD PRESSURE: 122 MMHG | HEIGHT: 62 IN | RESPIRATION RATE: 14 BRPM | BODY MASS INDEX: 20.12 KG/M2 | DIASTOLIC BLOOD PRESSURE: 60 MMHG | TEMPERATURE: 97.3 F | HEART RATE: 71 BPM | OXYGEN SATURATION: 98 %

## 2025-08-11 VITALS
TEMPERATURE: 97.9 F | HEART RATE: 73 BPM | OXYGEN SATURATION: 97 % | WEIGHT: 109.35 LBS | DIASTOLIC BLOOD PRESSURE: 71 MMHG | SYSTOLIC BLOOD PRESSURE: 118 MMHG | BODY MASS INDEX: 20.12 KG/M2 | HEIGHT: 62 IN | RESPIRATION RATE: 15 BRPM

## 2025-08-11 DIAGNOSIS — K59.00 OBSTIPATION: Primary | ICD-10-CM

## 2025-08-11 DIAGNOSIS — R10.30 LOWER ABDOMINAL PAIN: ICD-10-CM

## 2025-08-11 PROCEDURE — 3078F DIAST BP <80 MM HG: CPT | Performed by: NURSE PRACTITIONER

## 2025-08-11 PROCEDURE — 302449 STATCHG TRIAGE ONLY (STATISTIC)

## 2025-08-11 PROCEDURE — 99215 OFFICE O/P EST HI 40 MIN: CPT | Performed by: NURSE PRACTITIONER

## 2025-08-11 PROCEDURE — 3074F SYST BP LT 130 MM HG: CPT | Performed by: NURSE PRACTITIONER

## 2025-08-11 ASSESSMENT — FIBROSIS 4 INDEX
FIB4 SCORE: 1.733333333333333333
FIB4 SCORE: 1.733333333333333333

## 2025-08-17 DIAGNOSIS — E78.00 PURE HYPERCHOLESTEROLEMIA: ICD-10-CM

## 2025-08-17 DIAGNOSIS — I10 ESSENTIAL HYPERTENSION: ICD-10-CM

## 2025-08-18 RX ORDER — LOSARTAN POTASSIUM 50 MG/1
50 TABLET ORAL DAILY
Qty: 90 TABLET | Refills: 3 | Status: SHIPPED | OUTPATIENT
Start: 2025-08-18

## 2025-08-18 RX ORDER — ATORVASTATIN CALCIUM 10 MG/1
10 TABLET, FILM COATED ORAL EVERY EVENING
Qty: 90 TABLET | Refills: 0 | OUTPATIENT
Start: 2025-08-18

## 2025-08-25 DIAGNOSIS — K21.9 GASTROESOPHAGEAL REFLUX DISEASE WITHOUT ESOPHAGITIS: ICD-10-CM

## 2025-08-25 DIAGNOSIS — F41.1 GAD (GENERALIZED ANXIETY DISORDER): ICD-10-CM

## 2025-08-26 RX ORDER — PANTOPRAZOLE SODIUM 20 MG/1
20 TABLET, DELAYED RELEASE ORAL DAILY
Qty: 90 TABLET | Refills: 3 | Status: SHIPPED | OUTPATIENT
Start: 2025-08-26

## 2025-08-26 RX ORDER — ESCITALOPRAM OXALATE 10 MG/1
10 TABLET ORAL DAILY
Qty: 90 TABLET | Refills: 3 | Status: SHIPPED | OUTPATIENT
Start: 2025-08-26